# Patient Record
Sex: MALE | Race: WHITE | NOT HISPANIC OR LATINO | Employment: FULL TIME | URBAN - METROPOLITAN AREA
[De-identification: names, ages, dates, MRNs, and addresses within clinical notes are randomized per-mention and may not be internally consistent; named-entity substitution may affect disease eponyms.]

---

## 2019-02-18 ENCOUNTER — OFFICE VISIT (OUTPATIENT)
Dept: FAMILY MEDICINE | Facility: CLINIC | Age: 34
End: 2019-02-18
Payer: COMMERCIAL

## 2019-02-18 ENCOUNTER — LAB VISIT (OUTPATIENT)
Dept: LAB | Facility: HOSPITAL | Age: 34
End: 2019-02-18
Attending: FAMILY MEDICINE
Payer: COMMERCIAL

## 2019-02-18 VITALS
DIASTOLIC BLOOD PRESSURE: 72 MMHG | WEIGHT: 233.94 LBS | SYSTOLIC BLOOD PRESSURE: 113 MMHG | HEART RATE: 56 BPM | TEMPERATURE: 98 F

## 2019-02-18 DIAGNOSIS — Z13.220 ENCOUNTER FOR LIPID SCREENING FOR CARDIOVASCULAR DISEASE: Primary | ICD-10-CM

## 2019-02-18 DIAGNOSIS — Z13.220 ENCOUNTER FOR LIPID SCREENING FOR CARDIOVASCULAR DISEASE: ICD-10-CM

## 2019-02-18 DIAGNOSIS — Z13.6 ENCOUNTER FOR LIPID SCREENING FOR CARDIOVASCULAR DISEASE: Primary | ICD-10-CM

## 2019-02-18 DIAGNOSIS — Z76.0 MEDICATION REFILL: ICD-10-CM

## 2019-02-18 DIAGNOSIS — Z13.1 SCREENING FOR DIABETES MELLITUS: ICD-10-CM

## 2019-02-18 DIAGNOSIS — Z13.6 ENCOUNTER FOR LIPID SCREENING FOR CARDIOVASCULAR DISEASE: ICD-10-CM

## 2019-02-18 LAB
CHOLEST SERPL-MCNC: 241 MG/DL
CHOLEST/HDLC SERPL: 6.9 {RATIO}
GLUCOSE SERPL-MCNC: 92 MG/DL
HDLC SERPL-MCNC: 35 MG/DL
HDLC SERPL: 14.5 %
LDLC SERPL CALC-MCNC: 142 MG/DL
NONHDLC SERPL-MCNC: 206 MG/DL
TRIGL SERPL-MCNC: 320 MG/DL

## 2019-02-18 PROCEDURE — 99999 PR PBB SHADOW E&M-NEW PATIENT-LVL III: ICD-10-PCS | Mod: PBBFAC,,, | Performed by: FAMILY MEDICINE

## 2019-02-18 PROCEDURE — 99999 PR PBB SHADOW E&M-NEW PATIENT-LVL III: CPT | Mod: PBBFAC,,, | Performed by: FAMILY MEDICINE

## 2019-02-18 PROCEDURE — 36415 COLL VENOUS BLD VENIPUNCTURE: CPT | Mod: PO

## 2019-02-18 PROCEDURE — 99203 PR OFFICE/OUTPT VISIT, NEW, LEVL III, 30-44 MIN: ICD-10-PCS | Mod: S$GLB,,, | Performed by: FAMILY MEDICINE

## 2019-02-18 PROCEDURE — 80061 LIPID PANEL: CPT

## 2019-02-18 PROCEDURE — 99203 OFFICE O/P NEW LOW 30 MIN: CPT | Mod: S$GLB,,, | Performed by: FAMILY MEDICINE

## 2019-02-18 PROCEDURE — 82947 ASSAY GLUCOSE BLOOD QUANT: CPT

## 2019-02-18 RX ORDER — ESCITALOPRAM OXALATE 20 MG/1
20 TABLET ORAL DAILY
Qty: 90 TABLET | Refills: 3 | Status: SHIPPED | OUTPATIENT
Start: 2019-02-18 | End: 2020-02-28

## 2019-02-18 RX ORDER — ESCITALOPRAM OXALATE 20 MG/1
20 TABLET ORAL DAILY
Qty: 90 TABLET | Refills: 2 | Status: SHIPPED | OUTPATIENT
Start: 2019-02-18 | End: 2019-02-18 | Stop reason: CLARIF

## 2019-02-18 RX ORDER — ESCITALOPRAM OXALATE 20 MG/1
20 TABLET ORAL DAILY
COMMUNITY
End: 2019-02-18 | Stop reason: SDUPTHER

## 2019-02-18 NOTE — PATIENT INSTRUCTIONS
4 Steps for Eating Healthier  Changing the way you eat can improve your health. It can lower your cholesterol and blood pressure, and help you stay at a healthy weight. Your diet doesnt have to be bland and boring to be healthy. Just watch your calories and follow these steps:    1. Eat fewer unhealthy fats  · Choose more fish and lean meats instead of fatty cuts of meat.  · Skip butter and lard, and use less margarine.  · Pass on foods that have palm, coconut, or hydrogenated oils.  · Eat fewer high-fat dairy foods like cheese, ice cream, and whole milk.  · Get a heart-healthy cookbook and try some low-fat recipes.  2. Go light on salt  · Keep the saltshaker off the table.  · Limit high-salt ingredients, such as soy sauce, bouillon, and garlic salt.  · Instead of adding salt when cooking, season your food with herbs and flavorings. Try lemon, garlic, and onion.  · Limit convenience foods, such as boxed or canned foods and restaurant food.  · Read food labels and choose lower-sodium options.  3. Limit sugar  · Pause before you add sugars to pancakes, cereal, coffee, or tea. This includes white and brown table sugar, syrup, honey, and molasses. Cut your usual amount by half.  · Use non-sugar sweeteners. Stevia, aspartame, and sucralose can satisfy a sweet tooth without adding calories.  · Swap out sugar-filled soda and other drinks. Buy sugar-free or low-calorie beverages. Remember water is always the best choice.  · Read labels and choose foods with less added sugar. Keep in mind that dairy foods and foods with fruit will have some natural sugar.  · Cut the sugar in recipes by 1/3 to 1/2. Boost the flavor with extracts like almond, vanilla, or orange. Or add spices such as cinnamon or nutmeg.  4. Eat more fiber  · Eat fresh fruits and vegetables every day.  · Boost your diet with whole grains. Go for oats, whole-grain rice, and bran.  · Add beans and lentils to your meals.  · Drink more water to match your fiber  increase. This is to help prevent constipation.  Date Last Reviewed: 5/11/2015  © 4357-1401 [employer] [address] [place] [address], [place] [address]. All rights reserved. This information is not intended as a substitute for professional medical care. Always follow your healthcare professional's instructions.

## 2019-02-18 NOTE — PROGRESS NOTES
Subjective:       Patient ID: Denny Atkinson is a 34 y.o. male.    Chief Complaint: Establish Care    HPI   Presents to [facility] to establish Care. Currently has no complaints. Mentions that he would like to get his lipids checked due to his significant family history of htn and hyperlipidemia. Works out playing basketball about 3-5 times a week (for about an hour) but does admit he likes to eat greasy foods (like pizza).      Past Medical History:   Diagnosis Date    Allergy     Anxiety        Past Surgical History:   Procedure Laterality Date    FINGER SURGERY  2007    HERNIA REPAIR  1985       Family History   Problem Relation Age of Onset    Hypertension Father     Hyperlipidemia Father     Hypertension Brother     Hyperlipidemia Brother     Hypertension Paternal Grandmother     Cancer Paternal Grandfather     Hypertension Paternal Grandfather     Hyperlipidemia Paternal Grandfather        Review of patient's allergies indicates:  No Known Allergies        Social History     Substance and Sexual Activity   Drug Use No       Social History     Tobacco Use    Smoking status: Former Smoker     Types: Cigarettes     Last attempt to quit: 2004     Years since quitting: 15.1    Smokeless tobacco: Former User   Substance Use Topics    Alcohol use: No     Frequency: Never       Social History     Substance and Sexual Activity   Sexual Activity Yes         Current Outpatient Medications:     escitalopram oxalate (LEXAPRO) 20 MG tablet, Take 1 tablet (20 mg total) by mouth once daily., Disp: 90 tablet, Rfl: 3    Review of Systems   Constitutional: Negative for chills and fever.   HENT: Negative for congestion and sore throat.    Eyes: Negative for visual disturbance.   Respiratory: Negative for cough and shortness of breath.    Cardiovascular: Negative for chest pain.   Gastrointestinal: Negative for abdominal pain, constipation, diarrhea, nausea and vomiting.   Genitourinary: Negative for dysuria.    Musculoskeletal: Negative for joint swelling.   Skin: Negative for rash and wound.   Neurological: Negative for dizziness and headaches.   Hematological: Does not bruise/bleed easily.           Objective:          Vitals:    02/18/19 1037   BP: 113/72   Pulse: (!) 56   Temp: 97.6 °F (36.4 °C)   Weight: 106.1 kg (233 lb 14.5 oz)       Physical Exam   Constitutional: He appears well-developed and well-nourished. He is cooperative. No distress.   HENT:   Head: Normocephalic and atraumatic.   Right Ear: Hearing and external ear normal.   Left Ear: Hearing and external ear normal.   Nose: Nose normal.   Eyes: Conjunctivae, EOM and lids are normal.   Neck: Normal range of motion. Neck supple.   Cardiovascular: Normal rate, regular rhythm, normal heart sounds and normal pulses.   Pulmonary/Chest: Effort normal and breath sounds normal.   Abdominal: Soft. Normal appearance and bowel sounds are normal. There is no tenderness.   Musculoskeletal: Normal range of motion.   Neurological: He is alert.   Skin: Skin is warm. Capillary refill takes less than 2 seconds. No rash noted. No cyanosis.   Psychiatric: He has a normal mood and affect. His speech is normal and behavior is normal. Cognition and memory are normal.   Vitals reviewed.              Assessment/Plan     Denny was seen today for establish care.    Diagnoses and all orders for this visit:    Encounter for lipid screening for cardiovascular disease  -     Lipid panel; Future    Screening for diabetes mellitus  -     Glucose, fasting; Future    Medication refill  -     escitalopram oxalate (LEXAPRO) 20 MG tablet; Take 1 tablet (20 mg total) by mouth once daily.    Follow-up in about 1 year (around 2/18/2020) for wellness.    Future Appointments   Date Time Provider Department Center   2/18/2019  2:15 PM ALIZE DAVIES [facility] KEKE Cast MD  [facility]

## 2019-02-19 ENCOUNTER — PATIENT MESSAGE (OUTPATIENT)
Dept: FAMILY MEDICINE | Facility: CLINIC | Age: 34
End: 2019-02-19

## 2019-02-21 ENCOUNTER — TELEPHONE (OUTPATIENT)
Dept: FAMILY MEDICINE | Facility: CLINIC | Age: 34
End: 2019-02-21

## 2019-02-21 NOTE — TELEPHONE ENCOUNTER
----- Message from Chloe Zuleta sent at 2/21/2019  9:27 AM CST -----  Contact: self                                           attn:  Josefina  Patient [phone] is returning call to Nurse Josefina from this morning/please call

## 2019-02-21 NOTE — TELEPHONE ENCOUNTER
----- Message from Thuy Cast MD sent at 2/21/2019  8:10 AM CST -----  Can someone call this patient to see what exactly he is going to do about his cholesterol? His wife asked what to do through his proxy portal and I suggested maybe to try diet and exercise but I never got a response. I forgot to tell him he can also take fish oil to help with with the triglycerides. Can someone tell him that? Thanks!

## 2019-03-12 ENCOUNTER — OFFICE VISIT (OUTPATIENT)
Dept: ALLERGY | Facility: CLINIC | Age: 34
End: 2019-03-12
Payer: COMMERCIAL

## 2019-03-12 VITALS — OXYGEN SATURATION: 95 % | BODY MASS INDEX: 28.99 KG/M2 | WEIGHT: 225.88 LBS | HEIGHT: 74 IN | HEART RATE: 68 BPM

## 2019-03-12 DIAGNOSIS — J30.89 ALLERGIC RHINITIS DUE TO DUST MITE: ICD-10-CM

## 2019-03-12 DIAGNOSIS — J30.1 CHRONIC ALLERGIC RHINITIS DUE TO POLLEN: Primary | ICD-10-CM

## 2019-03-12 PROCEDURE — 3008F PR BODY MASS INDEX (BMI) DOCUMENTED: ICD-10-PCS | Mod: S$GLB,,, | Performed by: ALLERGY & IMMUNOLOGY

## 2019-03-12 PROCEDURE — 99204 OFFICE O/P NEW MOD 45 MIN: CPT | Mod: S$GLB,,, | Performed by: ALLERGY & IMMUNOLOGY

## 2019-03-12 PROCEDURE — 99999 PR PBB SHADOW E&M-EST. PATIENT-LVL III: CPT | Mod: PBBFAC,,, | Performed by: ALLERGY & IMMUNOLOGY

## 2019-03-12 PROCEDURE — 99204 PR OFFICE/OUTPT VISIT, NEW, LEVL IV, 45-59 MIN: ICD-10-PCS | Mod: S$GLB,,, | Performed by: ALLERGY & IMMUNOLOGY

## 2019-03-12 PROCEDURE — 99999 PR PBB SHADOW E&M-EST. PATIENT-LVL III: ICD-10-PCS | Mod: PBBFAC,,, | Performed by: ALLERGY & IMMUNOLOGY

## 2019-03-12 PROCEDURE — 3008F BODY MASS INDEX DOCD: CPT | Mod: S$GLB,,, | Performed by: ALLERGY & IMMUNOLOGY

## 2019-03-12 RX ORDER — CETIRIZINE HYDROCHLORIDE 10 MG/1
10 TABLET ORAL DAILY PRN
COMMUNITY

## 2019-03-12 NOTE — PROGRESS NOTES
ALLERGY & IMMUNOLOGY CLINIC - INITIAL CONSULTATION     HISTORY OF PRESENT ILLNESS     Patient ID: Denny Atkinson is a 34 y.o. male    CC: allergy - interested in sublingual immunotherapy    HPI: 35 yo man presents for initial evaluation of allergies.     Main allergy symptoms are sneezing, itchy eyes. Symptoms are worse in spring and whenever the season changes. Onset of symptoms in childhood. Has had skin testing multiple times, remembers pollen, dust, ragweed, mold, pet (feathers, cat, rabbit), cockroach as being positive. Allergy shots on and off x 15 years. Had an allergic reaction once time shortly after allergy shots - hives all over. Was treated with epinephrine.     Travel a lot - missed some doses of shots and felt shots were incompatible with his job/life. Stopped 5 years ago. Allergy symptoms worsened after he stopped shots.     Takes cetirizine daily and flonase as needed. Uses mattress covers, pillow covers, has HEPA filters in each room.     Dad is on sublingual drops for allergy self-administered at home. He is interested in this course of action.     Shellfish allergy - Never had a reaction, was eating, got skin testing, test was positive, was told to avoid, was prescribed an epipen. He ignored this advice and continues to eat. He does note mild irritation of throat when he eats shellfish, but only sometimes, and he still eats it anyway.      REVIEW OF SYSTEMS     CONST: no F/C/NS, no unintentional weight changes  NEURO: no H/A, no weakness, no paresthesias  EYES: no discharge, + pruritus, no erythema  EARS: no hearing loss, no sensation of fullness  NOSE: no congestion, no rhinorrhea, no discharge, no itching, + sneezing  PULM: no SOB, no wheezing, no cough  CV: no CP, + palpitations - mainly notices as he is trying to fall asleep at night, no leg swelling  GI: no dysphagia, no heartburn, no pain, no N/V/D  MSK: no joint pain, no muscle pain  DERM: no rashes, no skin breaks     MEDICAL HISTORY  "    MedHx: active problems reviewed  SurgHx: no ENT surgery  SocHx: nonsmoker, 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  FamHx: dad with allergic rhinitis symptoms  Allergies: see below  Medications: MAR reviewed    H/o Asthma: as a child  H/o Eczema: denies  H/o Rhinitis: see HPI  Oral Allergy: denies  Food Allergy: shellfish  Venom Allergy: denies  Latex Allergy: mild hand rash  Other Allergies: denies  Env/Occ: denies any environmental or occupational exposures     PHYSICAL EXAM     VS: Pulse 68   Ht 6' 2" (1.88 m)   Wt 102.4 kg (225 lb 13.8 oz)   SpO2 95%   BMI 29.00 kg/m²   GENERAL: alert, NAD, well-appearing, cooperative  EYES: PERRL, EOMI, no conjunctival injection, no discharge, no infraorbital shiners  EARS: external auditory canals normal B/L, TM normal B/L  NOSE: NT 2+ and pink B/L, +deviated septum,  No stringing mucous, no polyps  ORAL: MMM, no ulcers, no thrush, no cobblestoning  NECK: supple, trachea midline, no thyromegaly, no LAD  LUNGS: CTAB, no w/r/c, no increased WOB  HEART: RRR, normal S1/S2, no m/g/r  EXTREMITIES: +2 distal pulses, no c/c/e  LYMPHATICS: no cervical/submandibular LAD  DERM: no rashes, no skin breaks, no dystrophic fingernails  NEURO: normal gait, no facial asymmetry     ALLERGEN TESTING     Has had multiple skin testing in the past, last one he estimates was ten years ago.      ASSESSMENT & PLAN     Denny Atkinson is a 34 y.o. male with     1. chronic allergic rhinitis based on history of positive skin testing in the past.    -Reviewed medical management and recommend daily use of flonase, up to 2 SEN BID, reviewed proper technique.    -Reviewed immunotherapy options, including SCIT with rush and SLIT. Reviewed time commitment, risks, benefits, and efficacy of both.    -Will proceed with skin testing in one week - stop zyrtec from n ow until that visit. If a clear driving allergen is identified, we might proceed with specific SLIT, if not, I will likely recommend SCIT.     2. History of " shellfish allergy but eats with minimal issues   -Continue to eat if desired, likely false positive skin testing in the past.     Follow up 1 week for spt

## 2019-03-19 ENCOUNTER — OFFICE VISIT (OUTPATIENT)
Dept: ALLERGY | Facility: CLINIC | Age: 34
End: 2019-03-19
Payer: COMMERCIAL

## 2019-03-19 VITALS — WEIGHT: 225.75 LBS | HEART RATE: 68 BPM | BODY MASS INDEX: 28.97 KG/M2 | OXYGEN SATURATION: 98 % | HEIGHT: 74 IN

## 2019-03-19 DIAGNOSIS — J30.89 ALLERGIC RHINITIS DUE TO DUST MITE: ICD-10-CM

## 2019-03-19 DIAGNOSIS — J30.1 CHRONIC ALLERGIC RHINITIS DUE TO POLLEN: Primary | ICD-10-CM

## 2019-03-19 PROCEDURE — 99999 PR PBB SHADOW E&M-EST. PATIENT-LVL III: ICD-10-PCS | Mod: PBBFAC,,, | Performed by: ALLERGY & IMMUNOLOGY

## 2019-03-19 PROCEDURE — 3008F BODY MASS INDEX DOCD: CPT | Mod: S$GLB,,, | Performed by: ALLERGY & IMMUNOLOGY

## 2019-03-19 PROCEDURE — 99999 PR PBB SHADOW E&M-EST. PATIENT-LVL III: CPT | Mod: PBBFAC,,, | Performed by: ALLERGY & IMMUNOLOGY

## 2019-03-19 PROCEDURE — 95004 PERQ TESTS W/ALRGNC XTRCS: CPT | Mod: S$GLB,,, | Performed by: ALLERGY & IMMUNOLOGY

## 2019-03-19 PROCEDURE — 99213 OFFICE O/P EST LOW 20 MIN: CPT | Mod: 25,S$GLB,, | Performed by: ALLERGY & IMMUNOLOGY

## 2019-03-19 PROCEDURE — 95004 PR ALLERGY SKIN TESTS,ALLERGENS: ICD-10-PCS | Mod: S$GLB,,, | Performed by: ALLERGY & IMMUNOLOGY

## 2019-03-19 PROCEDURE — 99213 PR OFFICE/OUTPT VISIT, EST, LEVL III, 20-29 MIN: ICD-10-PCS | Mod: 25,S$GLB,, | Performed by: ALLERGY & IMMUNOLOGY

## 2019-03-19 PROCEDURE — 3008F PR BODY MASS INDEX (BMI) DOCUMENTED: ICD-10-PCS | Mod: S$GLB,,, | Performed by: ALLERGY & IMMUNOLOGY

## 2019-03-19 RX ORDER — EPINEPHRINE 0.3 MG/.3ML
1 INJECTION SUBCUTANEOUS ONCE
Qty: 0.3 ML | Refills: 1 | Status: SHIPPED | OUTPATIENT
Start: 2019-03-19 | End: 2024-03-28

## 2019-03-20 ENCOUNTER — TELEPHONE (OUTPATIENT)
Dept: PHARMACY | Facility: CLINIC | Age: 34
End: 2019-03-20

## 2019-03-21 NOTE — TELEPHONE ENCOUNTER
DOCUMENTATION ONLY:  Prior Authorization for Odactra approved from 03/21/2019 to 03/21/2020    Case Id: [id number]    Co-pay: $150.11    Patient Assistance IS required  ISIDRO        DOCUMENTATION ONLY  Submitted prior authorization request for Odactra to insurance on 03/21 5:03pm. ISIDRO  
5

## 2019-04-10 ENCOUNTER — TELEPHONE (OUTPATIENT)
Dept: PHARMACY | Facility: CLINIC | Age: 34
End: 2019-04-10

## 2019-04-10 RX ORDER — FLUTICASONE PROPIONATE 50 MCG
1 SPRAY, SUSPENSION (ML) NASAL DAILY
COMMUNITY

## 2019-04-10 NOTE — TELEPHONE ENCOUNTER
Initial Odactra consult completed on 4/10/19. Odactra and EpiPen will be delivered to [facility] on 4/15/19. $25 + $12 (Epipen) copay. First dose date pending appointment. inBasket sent to arrange delivery, CC on file. Confirmed 2 patient identifiers - name and . Therapy Appropriate.  confirmed with Fiordaliza Vivar on  to be sent to:    Attention: Charlene, [facility]   [address] [place] [address]     Pt reports history of allergy shots. Med Rec - Fluticasone bought OTC - added to profile. No missed work/activites.     Indication: dust-mite allergy  Goals of Therapy: Improve QoL through reduction in allergy symptoms     Patient was counseled on the administration directions:  1. Administer first dose in a health care setting due to the potential for allergic reactions; monitor patient for 30 minutes after first dose.   2. If well tolerated, subsequent doses may be taken at home  3. Remove sublingual tablet from blister immediately prior to administration.  4. Place tablet(s) under tongue until completely dissolved (>1 minute) and then swallow.   5. Wash hands after handling tablet.   6. Avoid food or beverage for 5 minutes following dissolution of tablet (to prevent the swallowing of allergen extract).  7. Auto-injectable epinephrine should be made available to patients.  8. If you have throat swelling, difficulty swallowing or difficulty breathing, use Epi Pen, call 911 and go to E.R.  9. May begin to have relief of house dust mite allergy symptoms within 2-3 months.  Missed Doses: take dose as soon as remembered. If not remembered till next day, skip dose - never take 2 doses in one day.     Patient was counseled on the following possible side effects, which include, but are not limited to:  · Oral itching (61%), Local pruritus (in the ear; 52%), Throat irritation (67%)  · swelling of lips (18%), swollen tongue (16%), Mouth edema (20%, uvula)  · nausea (14%)    DDIs: medication list reviewed-  None upon initial review.      Patient verbalized understanding. Consultation included: the importance of compliance and the importance of keeping all follow up appointments. All questions answered and addressed to patients satisfaction. [facility] will f/u with patient 1 week after start and [facility] to contact patient in 3 weeks for refills.     Agnes Sotelo, PharmD  Specialty Pharmacy Clinical Pharmacist  [facility]  P: [phone]

## 2019-04-11 NOTE — TELEPHONE ENCOUNTER
Call attempt 1 to inform patient that EpiPen script paid for a copay of $12. Need his permission to dispense along with the Odactra. Sent [employer].

## 2019-04-15 ENCOUNTER — PATIENT MESSAGE (OUTPATIENT)
Dept: ALLERGY | Facility: CLINIC | Age: 34
End: 2019-04-15

## 2019-04-16 ENCOUNTER — TELEPHONE (OUTPATIENT)
Dept: ALLERGY | Facility: CLINIC | Age: 34
End: 2019-04-16

## 2019-04-23 ENCOUNTER — OFFICE VISIT (OUTPATIENT)
Dept: ALLERGY | Facility: CLINIC | Age: 34
End: 2019-04-23
Payer: COMMERCIAL

## 2019-04-23 VITALS — HEIGHT: 74 IN | WEIGHT: 225.75 LBS | TEMPERATURE: 99 F | BODY MASS INDEX: 28.97 KG/M2

## 2019-04-23 DIAGNOSIS — J30.89 ALLERGIC RHINITIS DUE TO DUST MITE: Primary | ICD-10-CM

## 2019-04-23 DIAGNOSIS — J30.1 CHRONIC ALLERGIC RHINITIS DUE TO POLLEN: ICD-10-CM

## 2019-04-23 PROCEDURE — 99999 PR PBB SHADOW E&M-EST. PATIENT-LVL III: CPT | Mod: PBBFAC,,, | Performed by: ALLERGY & IMMUNOLOGY

## 2019-04-23 PROCEDURE — 99999 PR PBB SHADOW E&M-EST. PATIENT-LVL III: ICD-10-PCS | Mod: PBBFAC,,, | Performed by: ALLERGY & IMMUNOLOGY

## 2019-04-23 PROCEDURE — 3008F PR BODY MASS INDEX (BMI) DOCUMENTED: ICD-10-PCS | Mod: S$GLB,,, | Performed by: ALLERGY & IMMUNOLOGY

## 2019-04-23 PROCEDURE — 99213 PR OFFICE/OUTPT VISIT, EST, LEVL III, 20-29 MIN: ICD-10-PCS | Mod: S$GLB,,, | Performed by: ALLERGY & IMMUNOLOGY

## 2019-04-23 PROCEDURE — 99213 OFFICE O/P EST LOW 20 MIN: CPT | Mod: S$GLB,,, | Performed by: ALLERGY & IMMUNOLOGY

## 2019-04-23 PROCEDURE — 3008F BODY MASS INDEX DOCD: CPT | Mod: S$GLB,,, | Performed by: ALLERGY & IMMUNOLOGY

## 2019-04-23 NOTE — PROGRESS NOTES
"ALLERGY & IMMUNOLOGY CLINIC - FOLLOW UP     HISTORY OF PRESENT ILLNESS     Patient ID: Denny Atkinson is a 34 y.o. male    CC: first odactra dose    HPI: 33 yo man with history of allergic rhinitis presents for first dose of odactra sublingual dust mite immunotherapy. Feeling well. Rhinitis symptoms improved on daily cetirizine.      REVIEW OF SYSTEMS     CONST: no F/C/NS, no unintentional weight changes  NEURO: no H/A, no weakness, no paresthesias  EYES: no discharge, no pruritus, no erythema  EARS: no hearing loss, no sensation of fullness  NOSE: no congestion, no rhinorrhea, no itching, no sneezing  PULM: no SOB, no wheezing, no cough  CV: no CP, no palpitations, no leg swelling  GI: no dysphagia, no heartburn, no pain, no N/V/D  MSK: no joint pain, no muscle pain  DERM: no rashes, no skin breaks     MEDICAL HISTORY     MedHx: active problems reviewed     PHYSICAL EXAM     VS: Temp 98.6 °F (37 °C)   Ht 6' 2" (1.88 m)   Wt 102.4 kg (225 lb 12 oz)   BMI 28.98 kg/m²   GENERAL: alert, NAD, well-appearing, cooperative  EYES: EOMI, no conjunctival injection  NECK: supple  DERM: no rashes, no skin breaks, no dystrophic fingernails  NEURO: normal gait, no facial asymmetry     ALLERGEN TESTING     Skin Prick: 3/19/19: positives include dust mite, cat, dog, cockroach     ASSESSMENT & PLAN     Denny Atkinson is a 34 y.o. male with     Allergic rhinitis due to dust mite  Allergic rhinitis due to animals  Allergic rhinitis due to cockroach    Odactra first dose given today in clinic according to the rules of the package insert. Patient was observed for 30 minutes without incident.   EpiPen also provided to patient at this time, reviewed when and how to use, patient taught back.     Follow up: 1 year, sooner if needed.    Inna Augustin MD  Allergy/Immunology    "

## 2019-05-17 ENCOUNTER — TELEPHONE (OUTPATIENT)
Dept: PHARMACY | Facility: CLINIC | Age: 34
End: 2019-05-17

## 2019-05-17 NOTE — TELEPHONE ENCOUNTER
7 day follow-up call and refill set-up. Patient states he is doing well on therapy with just a little tingling when he takes it.

## 2019-06-13 ENCOUNTER — TELEPHONE (OUTPATIENT)
Dept: PHARMACY | Facility: CLINIC | Age: 34
End: 2019-06-13

## 2019-06-21 NOTE — TELEPHONE ENCOUNTER
Patient returned call and confirmed need of Odactra refill. $25 copay in 004. Patient is currently in [place] and has enough medication until 6/26. Will ship 6/24 to arrive 6/25 (he returns 6/24 evening). No changes to report and no questions or concerns at this time.

## 2019-07-24 ENCOUNTER — TELEPHONE (OUTPATIENT)
Dept: PHARMACY | Facility: CLINIC | Age: 34
End: 2019-07-24

## 2019-08-02 ENCOUNTER — PATIENT MESSAGE (OUTPATIENT)
Dept: ALLERGY | Facility: CLINIC | Age: 34
End: 2019-08-02

## 2019-08-21 ENCOUNTER — TELEPHONE (OUTPATIENT)
Dept: PHARMACY | Facility: CLINIC | Age: 34
End: 2019-08-21

## 2019-08-23 ENCOUNTER — TELEPHONE (OUTPATIENT)
Dept: PHARMACY | Facility: CLINIC | Age: 34
End: 2019-08-23

## 2019-09-20 ENCOUNTER — TELEPHONE (OUTPATIENT)
Dept: PHARMACY | Facility: CLINIC | Age: 34
End: 2019-09-20

## 2019-10-23 ENCOUNTER — TELEPHONE (OUTPATIENT)
Dept: PHARMACY | Facility: CLINIC | Age: 34
End: 2019-10-23

## 2019-11-25 ENCOUNTER — TELEPHONE (OUTPATIENT)
Dept: PHARMACY | Facility: CLINIC | Age: 34
End: 2019-11-25

## 2019-12-24 ENCOUNTER — TELEPHONE (OUTPATIENT)
Dept: PHARMACY | Facility: CLINIC | Age: 34
End: 2019-12-24

## 2020-01-29 ENCOUNTER — TELEPHONE (OUTPATIENT)
Dept: PHARMACY | Facility: CLINIC | Age: 35
End: 2020-01-29

## 2020-02-28 ENCOUNTER — TELEPHONE (OUTPATIENT)
Dept: PHARMACY | Facility: CLINIC | Age: 35
End: 2020-02-28

## 2020-02-28 DIAGNOSIS — Z76.0 MEDICATION REFILL: ICD-10-CM

## 2020-02-28 RX ORDER — ESCITALOPRAM OXALATE 20 MG/1
TABLET ORAL
Qty: 90 TABLET | Refills: 1 | Status: SHIPPED | OUTPATIENT
Start: 2020-02-28 | End: 2020-08-07 | Stop reason: SDUPTHER

## 2020-03-24 DIAGNOSIS — J30.89 ALLERGIC RHINITIS DUE TO DUST MITE: ICD-10-CM

## 2020-03-24 DIAGNOSIS — J30.1 CHRONIC ALLERGIC RHINITIS DUE TO POLLEN: ICD-10-CM

## 2020-03-30 ENCOUNTER — TELEPHONE (OUTPATIENT)
Dept: PHARMACY | Facility: CLINIC | Age: 35
End: 2020-03-30

## 2020-03-30 ENCOUNTER — PATIENT MESSAGE (OUTPATIENT)
Dept: FAMILY MEDICINE | Facility: CLINIC | Age: 35
End: 2020-03-30

## 2020-03-30 ENCOUNTER — TELEPHONE (OUTPATIENT)
Dept: FAMILY MEDICINE | Facility: CLINIC | Age: 35
End: 2020-03-30

## 2020-04-02 NOTE — TELEPHONE ENCOUNTER
Odactra confirmed. We will ship Odactra refill on  via fedex to arrive on . $25 copay- 004. Confirmed 2 patient identifiers - name and . Therapy appropriate.     Patient has 5 doses of Odactra remaining.  Pt reports they are not having any side effects so far. No missed doses, no new medications, no new allergies or health conditions reported at this time. All questions answered and addressed to patients satisfaction. Advised to call [facility] and provider if any issues arise.  Pt verbalized understanding.

## 2020-04-27 ENCOUNTER — TELEPHONE (OUTPATIENT)
Dept: PHARMACY | Facility: CLINIC | Age: 35
End: 2020-04-27

## 2020-05-18 ENCOUNTER — TELEPHONE (OUTPATIENT)
Dept: PHARMACY | Facility: CLINIC | Age: 35
End: 2020-05-18

## 2020-05-20 ENCOUNTER — TELEPHONE (OUTPATIENT)
Dept: PHARMACY | Facility: CLINIC | Age: 35
End: 2020-05-20

## 2020-05-26 ENCOUNTER — TELEPHONE (OUTPATIENT)
Dept: PHARMACY | Facility: CLINIC | Age: 35
End: 2020-05-26

## 2020-06-03 NOTE — TELEPHONE ENCOUNTER
Call attempt 4 for Odactra refill - LVM. Muxlim message read on 5/26. Sending postcard to address on file and InBasket to provider. Will close out in a week if no response.     Stevie Pineda, PharmD  Clinical Pharmacist   [facility]   P: [phone]

## 2020-06-10 ENCOUNTER — TELEPHONE (OUTPATIENT)
Dept: PHARMACY | Facility: CLINIC | Age: 35
End: 2020-06-10

## 2020-06-10 NOTE — TELEPHONE ENCOUNTER
Refill and followup call for Odatra. Patient confirmed need of the refill. Will deliver via FedEx on 6/10 to arrive on  with patient consent. Copay $25.00 at 004 with auth to charge [facility]. Address confirmed. Patient has 2 doses remaining (2 day supply). Patient denies missed doses and no side effects.  No new medications/allergies/medical conditions. Labs are stable. No ER/Urgent care visits in past month. Patient taking the medication as directed. Patient denies any further questions. Confirmed 2 patient identifiers - Name and .    Gato Ventura, PharmD  Clinical Pharmacist  [facility]  P: [phone]

## 2020-06-16 ENCOUNTER — TELEPHONE (OUTPATIENT)
Dept: FAMILY MEDICINE | Facility: CLINIC | Age: 35
End: 2020-06-16

## 2020-06-16 NOTE — TELEPHONE ENCOUNTER
Left message letting pt know that [facility], that way insurance will cover, provider knows what labs are needed, and patient will know what labs provider has requested.

## 2020-06-16 NOTE — TELEPHONE ENCOUNTER
----- Message from Natalie Sanchez sent at 6/16/2020  2:48 PM CDT -----  Regarding: orders for lab  Type: Needs Medical Advice  Who Called:  pt  Symptoms (please be specific):    How long has patient had these symptoms:    Pharmacy name and phone #:    Best Call Back Number:     Additional Information: pt stated states he would like for provider to order labs prior to coming to schedule appt. June 16, @ 5 pm. thanks

## 2020-07-09 ENCOUNTER — TELEPHONE (OUTPATIENT)
Dept: PHARMACY | Facility: CLINIC | Age: 35
End: 2020-07-09

## 2020-07-21 ENCOUNTER — OFFICE VISIT (OUTPATIENT)
Dept: DERMATOLOGY | Facility: CLINIC | Age: 35
End: 2020-07-21
Payer: COMMERCIAL

## 2020-07-21 DIAGNOSIS — L20.89 OTHER ATOPIC DERMATITIS: ICD-10-CM

## 2020-07-21 DIAGNOSIS — L30.1 DYSHIDROTIC ECZEMA: Primary | ICD-10-CM

## 2020-07-21 PROCEDURE — 99202 PR OFFICE/OUTPT VISIT, NEW, LEVL II, 15-29 MIN: ICD-10-PCS | Mod: 95,,, | Performed by: DERMATOLOGY

## 2020-07-21 PROCEDURE — 99202 OFFICE O/P NEW SF 15 MIN: CPT | Mod: 95,,, | Performed by: DERMATOLOGY

## 2020-07-21 RX ORDER — BETAMETHASONE DIPROPIONATE 0.5 MG/G
CREAM TOPICAL 2 TIMES DAILY PRN
Qty: 45 G | Refills: 1 | Status: SHIPPED | OUTPATIENT
Start: 2020-07-21 | End: 2020-07-24 | Stop reason: SDUPTHER

## 2020-07-21 RX ORDER — CRISABOROLE 20 MG/G
OINTMENT TOPICAL
Qty: 60 G | Refills: 1 | Status: SHIPPED | OUTPATIENT
Start: 2020-07-21 | End: 2020-07-24 | Stop reason: SDUPTHER

## 2020-07-21 NOTE — PATIENT INSTRUCTIONS
HAND ECZEMA INSTRUCTIONS    Use Eucrisa ointment twice a day (non-steroid, safe to use long term).  Use betamethasone cream at bedtime (strong steroid, use as needed for flares only). Can use both medications with white cotton gloves at bedtime.  Apply medications first and then apply barrier cream (Pratibha's, Desitin or Aquaphor 3-in-1 diaper rash cream) and then apply white cotton gloves.   Can use Vanicream or CeraVe cream after each hand washing  Can use dove sensitive skin bar for hand washing

## 2020-07-21 NOTE — PROGRESS NOTES
Subjective:       Patient ID:  Denny Atkinson is a 35 y.o. male who presents for No chief complaint on file.    The patient location is: home  The chief complaint leading to consultation is: rash    Visit type: audiovisual    Face to Face time with patient: 15 min  15 minutes of total time spent on the encounter, which includes face to face time and non-face to face time preparing to see the patient (eg, review of tests), Obtaining and/or reviewing separately obtained history, Documenting clinical information in the electronic or other health record, Independently interpreting results (not separately reported) and communicating results to the patient/family/caregiver, or Care coordination (not separately reported).         Each patient to whom he or she provides medical services by telemedicine is:  (1) informed of the relationship between the physician and patient and the respective role of any other health care provider with respect to management of the patient; and (2) notified that he or she may decline to receive medical services by telemedicine and may withdraw from such care at any time.    Notes:     History of Present Illness: The patient presents with chief complaint of rash.  Location: hands  Duration: several months  Signs/Symptoms: scaly, pruritus    Prior treatments: Gold bond psoriasis relief cream, HTC        Review of Systems   Constitutional: Negative for fever and chills.   Gastrointestinal: Negative for nausea and vomiting.   Skin: Positive for itching and rash. Negative for daily sunscreen use, activity-related sunscreen use and recent sunburn.   Hematologic/Lymphatic: Does not bruise/bleed easily.        Objective:    Physical Exam   Constitutional: He appears well-developed and well-nourished. No distress.   Neurological: He is alert and oriented to person, place, and time. He is not disoriented.   Psychiatric: He has a normal mood and affect.   Skin:   Areas Examined (abnormalities noted  in diagram):   Head / Face Inspection Performed  Neck Inspection Performed  RUE Inspected  LUE Inspection Performed  Nails and Digits Inspection Performed               Assessment / Plan:        Dyshidrotic eczema  Other atopic dermatitis  -     betamethasone dipropionate (DIPROLENE) 0.05 % cream; Apply topically 2 (two) times daily as needed. Can use with gloves at bedtime  Dispense: 45 g; Refill: 1  -     crisaborole (EUCRISA) 2 % Oint; AAA bid.  Non-steroid.  Safe to use long-term  Dispense: 60 g; Refill: 1  -     Discussed diagnosis and relation to sensitive skin.  Will start above meds and with white cotton glove occlusion at bedtime.  Recommend OTC barrier cream and vanicream after each hand washing throughout the day.  AVS given.            Follow up if symptoms worsen or fail to improve.     **20 minutes spent in counseling and coordination of care**

## 2020-07-21 NOTE — TELEPHONE ENCOUNTER
Refill call regarding Odactra at [facility].  Will prepare for shipment with patient consent on  to arrive .  Copay $25.00 ([facility] 8274) Patient states he is fine until .  Patient has not started any new medications including OTC drugs. Patient has not had any medication/ dose or instruction changes. No new allergies or side effects reported with this shipment. Medication is being taken as prescribed by physician and properly stored. Two patient identifiers:  and Address verified. Patient has no questions or concerns for [facility].

## 2020-07-24 DIAGNOSIS — L30.1 DYSHIDROTIC ECZEMA: ICD-10-CM

## 2020-07-24 DIAGNOSIS — L20.89 OTHER ATOPIC DERMATITIS: ICD-10-CM

## 2020-07-28 RX ORDER — BETAMETHASONE DIPROPIONATE 0.5 MG/G
CREAM TOPICAL 2 TIMES DAILY PRN
Qty: 45 G | Refills: 1 | Status: SHIPPED | OUTPATIENT
Start: 2020-07-28 | End: 2022-01-26

## 2020-07-28 RX ORDER — CRISABOROLE 20 MG/G
OINTMENT TOPICAL
Qty: 60 G | Refills: 1 | Status: SHIPPED | OUTPATIENT
Start: 2020-07-28 | End: 2022-01-26

## 2020-08-07 ENCOUNTER — OFFICE VISIT (OUTPATIENT)
Dept: FAMILY MEDICINE | Facility: CLINIC | Age: 35
End: 2020-08-07
Payer: COMMERCIAL

## 2020-08-07 VITALS
OXYGEN SATURATION: 98 % | DIASTOLIC BLOOD PRESSURE: 80 MMHG | WEIGHT: 234.38 LBS | TEMPERATURE: 98 F | SYSTOLIC BLOOD PRESSURE: 112 MMHG | HEART RATE: 66 BPM | BODY MASS INDEX: 30.08 KG/M2 | HEIGHT: 74 IN

## 2020-08-07 DIAGNOSIS — Z00.00 WELLNESS EXAMINATION: Primary | ICD-10-CM

## 2020-08-07 DIAGNOSIS — H61.23 BILATERAL IMPACTED CERUMEN: ICD-10-CM

## 2020-08-07 DIAGNOSIS — Z76.0 MEDICATION REFILL: ICD-10-CM

## 2020-08-07 PROCEDURE — 99999 PR PBB SHADOW E&M-EST. PATIENT-LVL III: CPT | Mod: PBBFAC,,, | Performed by: FAMILY MEDICINE

## 2020-08-07 PROCEDURE — 99999 PR PBB SHADOW E&M-EST. PATIENT-LVL III: ICD-10-PCS | Mod: PBBFAC,,, | Performed by: FAMILY MEDICINE

## 2020-08-07 PROCEDURE — 99395 PREV VISIT EST AGE 18-39: CPT | Mod: S$GLB,,, | Performed by: FAMILY MEDICINE

## 2020-08-07 PROCEDURE — 99395 PR PREVENTIVE VISIT,EST,18-39: ICD-10-PCS | Mod: S$GLB,,, | Performed by: FAMILY MEDICINE

## 2020-08-07 RX ORDER — ESCITALOPRAM OXALATE 20 MG/1
20 TABLET ORAL DAILY
Qty: 90 TABLET | Refills: 3 | Status: SHIPPED | OUTPATIENT
Start: 2020-08-07 | End: 2020-12-29 | Stop reason: SDUPTHER

## 2020-08-07 NOTE — PROGRESS NOTES
Subjective:       Patient ID: Denny Atkinson is a 35 y.o. male.    Chief Complaint: Annual Exam    HPI    Mr. Atkinson presents to clinic for his annual. Currently has no complaints.     Diet/Exercises: Hasn't been able to exercise or diet as much due to recent pandemic. Used to play basketball but stopped.       Past Medical History:   Diagnosis Date    Allergy     Anxiety        Past Surgical History:   Procedure Laterality Date    FINGER SURGERY  2007    HERNIA REPAIR  1985       Family History   Problem Relation Age of Onset    Hypertension Father     Hyperlipidemia Father     Hypertension Brother     Hyperlipidemia Brother     Hypertension Paternal Grandmother     Cancer Paternal Grandfather     Hypertension Paternal Grandfather     Hyperlipidemia Paternal Grandfather        Social History     Tobacco Use    Smoking status: Former Smoker     Types: Cigarettes     Quit date:      Years since quittin.6    Smokeless tobacco: Never Used   Substance Use Topics    Alcohol use: No     Frequency: Never     Drinks per session: Patient refused     Binge frequency: Never    Drug use: No       Social History     Substance and Sexual Activity   Sexual Activity Yes          Current Outpatient Medications:     allerg xt,D.farinae-D.pteronys (ODACTRA) 12 SQ-HDM Subl, Place 1 tablet under the tongue once daily., Disp: 30 tablet, Rfl: 11    betamethasone dipropionate (DIPROLENE) 0.05 % cream, Apply topically 2 (two) times daily as needed. Can use with gloves at bedtime, Disp: 45 g, Rfl: 1    cetirizine (ZYRTEC) 10 MG tablet, Take 10 mg by mouth once daily., Disp: , Rfl:     crisaborole (EUCRISA) 2 % Oint, AAA bid.  Non-steroid.  Safe to use long-term, Disp: 60 g, Rfl: 1    EPINEPHrine (EPIPEN) 0.3 mg/0.3 mL AtIn, Inject 0.3 mLs (0.3 mg total) into the muscle once. for 1 dose, Disp: 0.3 mL, Rfl: 1    escitalopram oxalate (LEXAPRO) 20 MG tablet, TAKE 1 TABLET BY MOUTH EVERY DAY, Disp: 90 tablet, Rfl:  "1    fluticasone (FLONASE) 50 mcg/actuation nasal spray, 1 spray by Each Nare route once daily., Disp: , Rfl:      Review of patient's allergies indicates:  No Known Allergies         Review of Systems   Constitutional: Negative for chills and fever.   HENT: Negative for congestion and sore throat.    Eyes: Negative for visual disturbance.   Respiratory: Negative for cough and shortness of breath.    Cardiovascular: Negative for chest pain.   Gastrointestinal: Negative for abdominal pain, constipation, diarrhea, nausea and vomiting.   Genitourinary: Negative for dysuria.   Musculoskeletal: Negative for joint swelling.   Skin: Negative for rash and wound.   Neurological: Negative for dizziness and headaches.   Hematological: Does not bruise/bleed easily.           Objective:          Vitals:    08/07/20 1149   BP: 112/80   Pulse: 66   Temp: 97.6 °F (36.4 °C)   SpO2: 98%   Weight: 106.3 kg (234 lb 5.6 oz)   Height: 6' 2" (1.88 m)       Physical Exam  Vitals signs reviewed.   Constitutional:       General: He is not in acute distress.     Appearance: Normal appearance. He is well-developed.   HENT:      Head: Normocephalic and atraumatic.      Right Ear: External ear normal. There is impacted cerumen.      Left Ear: External ear normal. There is impacted cerumen.   Eyes:      Conjunctiva/sclera: Conjunctivae normal.   Cardiovascular:      Rate and Rhythm: Normal rate and regular rhythm.      Pulses: Normal pulses.      Heart sounds: Normal heart sounds.   Pulmonary:      Effort: Pulmonary effort is normal.      Breath sounds: Normal breath sounds.   Abdominal:      General: Bowel sounds are normal.      Palpations: Abdomen is soft.      Tenderness: There is no abdominal tenderness.   Musculoskeletal: Normal range of motion.   Skin:     General: Skin is warm.      Findings: No rash.   Neurological:      General: No focal deficit present.      Mental Status: He is alert.   Psychiatric:         Speech: Speech " normal.         Behavior: Behavior normal. Behavior is cooperative.                 Assessment/Plan     Denny was seen today for annual exam.    Diagnoses and all orders for this visit:    Wellness examination  -     Lipid Panel; Future  -     Comprehensive metabolic panel; Future  -     CBC auto differential; Future  -     URINALYSIS; Future    Medication refill  -     escitalopram oxalate (LEXAPRO) 20 MG tablet; Take 1 tablet (20 mg total) by mouth once daily.    Bilateral impacted cerumen  -     Ear wax removal          Follow up in about 1 year (around 8/7/2021) for wellness.    Future Appointments   Date Time Provider Department Center   8/11/2020 12:00 PM SPECIMEN, ANNAMARIA [facility] SPECLAB Annamaria   8/11/2020 12:15 PM LAB, [place] [facility] LAB Annamaria Cast MD  [facility]

## 2020-08-11 ENCOUNTER — LAB VISIT (OUTPATIENT)
Dept: LAB | Facility: HOSPITAL | Age: 35
End: 2020-08-11
Attending: FAMILY MEDICINE
Payer: COMMERCIAL

## 2020-08-11 DIAGNOSIS — Z00.00 WELLNESS EXAMINATION: ICD-10-CM

## 2020-08-11 LAB
ALBUMIN SERPL BCP-MCNC: 4.7 G/DL (ref 3.5–5.2)
ALP SERPL-CCNC: 98 U/L (ref 55–135)
ALT SERPL W/O P-5'-P-CCNC: 44 U/L (ref 10–44)
ANION GAP SERPL CALC-SCNC: 9 MMOL/L (ref 8–16)
AST SERPL-CCNC: 21 U/L (ref 10–40)
BASOPHILS # BLD AUTO: 0.02 K/UL (ref 0–0.2)
BASOPHILS NFR BLD: 0.3 % (ref 0–1.9)
BILIRUB SERPL-MCNC: 1.1 MG/DL (ref 0.1–1)
BUN SERPL-MCNC: 11 MG/DL (ref 6–20)
CALCIUM SERPL-MCNC: 9.9 MG/DL (ref 8.7–10.5)
CHLORIDE SERPL-SCNC: 104 MMOL/L (ref 95–110)
CHOLEST SERPL-MCNC: 268 MG/DL (ref 120–199)
CHOLEST/HDLC SERPL: 7.1 {RATIO} (ref 2–5)
CO2 SERPL-SCNC: 26 MMOL/L (ref 23–29)
CREAT SERPL-MCNC: 0.9 MG/DL (ref 0.5–1.4)
DIFFERENTIAL METHOD: ABNORMAL
EOSINOPHIL # BLD AUTO: 0.2 K/UL (ref 0–0.5)
EOSINOPHIL NFR BLD: 2.8 % (ref 0–8)
ERYTHROCYTE [DISTWIDTH] IN BLOOD BY AUTOMATED COUNT: 13 % (ref 11.5–14.5)
EST. GFR  (AFRICAN AMERICAN): >60 ML/MIN/1.73 M^2
EST. GFR  (NON AFRICAN AMERICAN): >60 ML/MIN/1.73 M^2
GLUCOSE SERPL-MCNC: 100 MG/DL (ref 70–110)
HCT VFR BLD AUTO: 46.4 % (ref 40–54)
HDLC SERPL-MCNC: 38 MG/DL (ref 40–75)
HDLC SERPL: 14.2 % (ref 20–50)
HGB BLD-MCNC: 14.9 G/DL (ref 14–18)
IMM GRANULOCYTES # BLD AUTO: 0 K/UL (ref 0–0.04)
IMM GRANULOCYTES NFR BLD AUTO: 0 % (ref 0–0.5)
LDLC SERPL CALC-MCNC: 185.2 MG/DL (ref 63–159)
LYMPHOCYTES # BLD AUTO: 2.9 K/UL (ref 1–4.8)
LYMPHOCYTES NFR BLD: 50.3 % (ref 18–48)
MCH RBC QN AUTO: 30.7 PG (ref 27–31)
MCHC RBC AUTO-ENTMCNC: 32.1 G/DL (ref 32–36)
MCV RBC AUTO: 96 FL (ref 82–98)
MONOCYTES # BLD AUTO: 0.5 K/UL (ref 0.3–1)
MONOCYTES NFR BLD: 9.2 % (ref 4–15)
NEUTROPHILS # BLD AUTO: 2.2 K/UL (ref 1.8–7.7)
NEUTROPHILS NFR BLD: 37.4 % (ref 38–73)
NONHDLC SERPL-MCNC: 230 MG/DL
NRBC BLD-RTO: 0 /100 WBC
PLATELET # BLD AUTO: 282 K/UL (ref 150–350)
PMV BLD AUTO: 10.9 FL (ref 9.2–12.9)
POTASSIUM SERPL-SCNC: 4.1 MMOL/L (ref 3.5–5.1)
PROT SERPL-MCNC: 7.9 G/DL (ref 6–8.4)
RBC # BLD AUTO: 4.86 M/UL (ref 4.6–6.2)
SODIUM SERPL-SCNC: 139 MMOL/L (ref 136–145)
TRIGL SERPL-MCNC: 224 MG/DL (ref 30–150)
WBC # BLD AUTO: 5.76 K/UL (ref 3.9–12.7)

## 2020-08-11 PROCEDURE — 80061 LIPID PANEL: CPT

## 2020-08-11 PROCEDURE — 80053 COMPREHEN METABOLIC PANEL: CPT

## 2020-08-11 PROCEDURE — 85025 COMPLETE CBC W/AUTO DIFF WBC: CPT

## 2020-08-11 PROCEDURE — 36415 COLL VENOUS BLD VENIPUNCTURE: CPT | Mod: PO

## 2020-08-14 ENCOUNTER — TELEPHONE (OUTPATIENT)
Dept: PHARMACY | Facility: CLINIC | Age: 35
End: 2020-08-14

## 2020-08-22 ENCOUNTER — TELEPHONE (OUTPATIENT)
Dept: PHARMACY | Facility: CLINIC | Age: 35
End: 2020-08-22

## 2020-09-10 ENCOUNTER — TELEPHONE (OUTPATIENT)
Dept: FAMILY MEDICINE | Facility: CLINIC | Age: 35
End: 2020-09-10

## 2020-09-14 ENCOUNTER — TELEPHONE (OUTPATIENT)
Dept: PHARMACY | Facility: CLINIC | Age: 35
End: 2020-09-14

## 2020-10-09 ENCOUNTER — TELEPHONE (OUTPATIENT)
Dept: FAMILY MEDICINE | Facility: CLINIC | Age: 35
End: 2020-10-09

## 2020-10-14 ENCOUNTER — TELEPHONE (OUTPATIENT)
Dept: FAMILY MEDICINE | Facility: CLINIC | Age: 35
End: 2020-10-14

## 2020-10-14 NOTE — TELEPHONE ENCOUNTER
----- Message from Saray Guajardo MA sent at 10/14/2020  2:47 PM CDT -----  Returning a call to Dr.Simon  Call back # [phone]

## 2020-10-15 ENCOUNTER — TELEPHONE (OUTPATIENT)
Dept: FAMILY MEDICINE | Facility: CLINIC | Age: 35
End: 2020-10-15

## 2020-10-15 NOTE — TELEPHONE ENCOUNTER
Multiple attempts have been made to contact patient regarding lab results from 8/11/20.   Please make result note and release in  portal.

## 2020-10-15 NOTE — TELEPHONE ENCOUNTER
Yea I tried to call him a few times. I know he hasn't been exercising much based on what he told me last time. If he tries to exercise more and cut back on greasy/carb heavy foods it may help with his cholesterol. Sorry for the phone tag.

## 2020-10-15 NOTE — TELEPHONE ENCOUNTER
Spoke to pt to inform him that lab results are awaiting MD interpretations. Pt stated, he has viewed his results via pt portal but would like to know if you recommend that he start a medication to help lower his cholesterol levels or should he continue to try lowering his levels by diet/excercise at this time. He also stated, he has a family history of elevated cholesterol. Please advise.

## 2020-10-15 NOTE — TELEPHONE ENCOUNTER
----- Message from Princess PARTH Gonzalez sent at 10/15/2020  9:28 AM CDT -----  Contact: pt  Type:  Patient Returning Call    Who Called:  patient   Who Left Message for Patient:  Dr. Cast   Does the patient know what this is regarding?:  yes  Best Call Back Number:  [phone] (home)     Additional Information:

## 2020-11-14 ENCOUNTER — IMMUNIZATION (OUTPATIENT)
Dept: FAMILY MEDICINE | Facility: CLINIC | Age: 35
End: 2020-11-14
Payer: COMMERCIAL

## 2020-11-14 PROCEDURE — 90471 IMMUNIZATION ADMIN: CPT | Mod: S$GLB,,, | Performed by: FAMILY MEDICINE

## 2020-11-14 PROCEDURE — 90471 FLU VACCINE (QUAD) GREATER THAN OR EQUAL TO 3YO PRESERVATIVE FREE IM: ICD-10-PCS | Mod: S$GLB,,, | Performed by: FAMILY MEDICINE

## 2020-11-14 PROCEDURE — 90686 IIV4 VACC NO PRSV 0.5 ML IM: CPT | Mod: S$GLB,,, | Performed by: FAMILY MEDICINE

## 2020-11-14 PROCEDURE — 90686 FLU VACCINE (QUAD) GREATER THAN OR EQUAL TO 3YO PRESERVATIVE FREE IM: ICD-10-PCS | Mod: S$GLB,,, | Performed by: FAMILY MEDICINE

## 2020-11-17 ENCOUNTER — SPECIALTY PHARMACY (OUTPATIENT)
Dept: PHARMACY | Facility: CLINIC | Age: 35
End: 2020-11-17

## 2020-11-17 NOTE — TELEPHONE ENCOUNTER
Call to patient to explain that the Odactra copay card has changed and copay is now $50 per fill. Medication has been shipped for today. No answer, LVM.     Gato Ventura, PharmD  Clinical Pharmacist  [facility]  P: [phone]

## 2020-11-17 NOTE — TELEPHONE ENCOUNTER
Patient returned call, explained to patient that the coupon card no longer makes copay $25. Patient acknowledged, he is good with paying this copay. No other questions or concerns.     Gato Ventura, PharmD  Clinical Pharmacist  [facility]  P: [phone]

## 2020-11-17 NOTE — TELEPHONE ENCOUNTER
Specialty Pharmacy - Refill Coordination    Specialty Medication Orders Linked to Encounter      Most Recent Value   Medication #1  allerg xt,D.farinae-D.pteronys (ODACTRA) 12 SQ-HDM Subl (Order#[id number], Rx#[id number])          Refill Questions - Documented Responses      Most Recent Value   Relationship to patient of person spoken to?  Self   HIPAA/medical authority confirmed?  Yes   Any changes in contact preferences or allowed representatives?  No   Has the patient had any insurance changes?  No   Has the patient had any changes to specialty medication, dose, or instructions?  No   Has the patient started taking any new medications, herbals, or supplements?  No   Has the patient been diagnosed with any new medical conditions?  No   Does the patient have any new allergies to medications or foods?  No   Does the patient have any concerns about side effects?  No   Can the patient store medication/sharps container properly (at the correct temperature, away from children/pets, etc.)?  Yes   Can the patient call emergency services (911) in the event of an emergency?  Yes   Does the patient have any concerns or questions about taking or administering this medication as prescribed?  No   How many doses did the patient miss in the past 4 weeks or since the last fill?  0   How many doses does the patient have on hand?  0   How many days does the patient report on hand quantity will last?  0   Does the number of doses/days supply remaining match pharmacy expected amounts?  Yes   Does the patient feel that this medication is effective?  Yes   During the past 4 weeks, has patient missed any activities due to condition or medication?  No   During the past 4 weeks, did patient have any of the following urgent care visits?  None   How will the patient receive the medication?  Mail   When does the patient need to receive the medication?  11/18/20   Shipping Address  Home   Address in [facility] confirmed and updated if  neccessary?  Yes   Expected Copay ($)  50   Is the patient able to afford the medication copay?  Yes   Payment Method  zero copay   Days supply of Refill  30   Would patient like to speak to a pharmacist?  No   Do you want to trigger an intervention?  No   Do you want to trigger an additional referral task?  No   Refill activity completed?  Yes   Refill activity plan  Refill scheduled          Current Outpatient Medications   Medication Sig    allerg xt,D.farinae-D.pteronys (ODACTRA) 12 SQ-HDM Subl Place 1 tablet under the tongue once daily.    betamethasone dipropionate (DIPROLENE) 0.05 % cream Apply topically 2 (two) times daily as needed. Can use with gloves at bedtime    cetirizine (ZYRTEC) 10 MG tablet Take 10 mg by mouth once daily.    crisaborole (EUCRISA) 2 % Oint AAA bid.  Non-steroid.  Safe to use long-term    EPINEPHrine (EPIPEN) 0.3 mg/0.3 mL AtIn Inject 0.3 mLs (0.3 mg total) into the muscle once. for 1 dose    escitalopram oxalate (LEXAPRO) 20 MG tablet Take 1 tablet (20 mg total) by mouth once daily.    fluticasone (FLONASE) 50 mcg/actuation nasal spray 1 spray by Each Nare route once daily.   Last reviewed on 8/7/2020 11:51 AM by Ct Mendoza MA    Review of patient's allergies indicates:  No Known Allergies Last reviewed on  8/7/2020 11:50 AM by Ct Mendoza      Tasks added this encounter   12/10/2020 - Refill Call (Auto Added)   Tasks due within next 3 months   11/30/2020 - Clinical - Follow Up Assesement (180 day)     Gato Ventura PharmD  [facility]  [address] [place] [id number]  Phone: [phone]  Fax: [phone]

## 2020-11-17 NOTE — TELEPHONE ENCOUNTER
Incoming return call from this patient Denny Atkinson MRN [id number] for his Odactra refill. Refill NOT completed due to coupon card savings not applied. Copay with only  primary  insurance is $50. (whereas with coupon applied it has brought down to $25). Patient would like us to look into this. If we can secure savings, patient is okay with shipping either today or tomorrow and I verified address on file. If we cannot secure any further savings he would like a call back. Forwarded to FA team and pharmacist team member Gato for further follow-up.

## 2020-12-04 ENCOUNTER — SPECIALTY PHARMACY (OUTPATIENT)
Dept: PHARMACY | Facility: CLINIC | Age: 35
End: 2020-12-04

## 2020-12-04 NOTE — TELEPHONE ENCOUNTER
Specialty Pharmacy - Clinical Reassessment    Specialty Medication Orders Linked to Encounter      Most Recent Value   Medication #1  allerg xt,D.farinae-D.pteronys (ODACTRA) 12 SQ-HDM Subl (Order#[id number], Rx#[id number])        Subjective    Denny Atkinson is a 35 y.o. male, who is followed by the specialty pharmacy service for management and education.    Recent Encounters     Date Type Provider Description    12/04/2020 Specialty Pharmacy Gato Ventura, Naseem Follow-up Clinical Reassessment    11/17/2020 Specialty Pharmacy Elena Yi, Naseem Refill Coordination        Clinical call attempts since last clinical assessment   No call attempts found.     Today he received follow up education for his specialty medication(s).    Current Outpatient Medications   Medication Sig    allerg xt,D.farinae-D.pteronys (ODACTRA) 12 SQ-HDM Subl Place 1 tablet under the tongue once daily.    betamethasone dipropionate (DIPROLENE) 0.05 % cream Apply topically 2 (two) times daily as needed. Can use with gloves at bedtime    cetirizine (ZYRTEC) 10 MG tablet Take 10 mg by mouth once daily.    crisaborole (EUCRISA) 2 % Oint AAA bid.  Non-steroid.  Safe to use long-term    EPINEPHrine (EPIPEN) 0.3 mg/0.3 mL AtIn Inject 0.3 mLs (0.3 mg total) into the muscle once. for 1 dose    escitalopram oxalate (LEXAPRO) 20 MG tablet Take 1 tablet (20 mg total) by mouth once daily.    fluticasone (FLONASE) 50 mcg/actuation nasal spray 1 spray by Each Nare route once daily.   Last reviewed on 8/7/2020 11:51 AM by Ct Mendoza MA    Review of patient's allergies indicates:  No Known AllergiesLast reviewed on  8/7/2020 11:50 AM by Ct Mendoza    Drug Interactions    Drug interactions evaluated: yes  Clinically relevant drug interactions identified: no  Provided the patient with educational material regarding drug interactions: not applicable       Medication Adherence    Patient reported X missed doses in the last month: 0  Any  "gaps in refill history greater than 2 weeks in the last 3 months: no  Demonstrates understanding of importance of adherence: yes  Informant: patient  Reliability of informant: reliable  Provider-estimated medication adherence level: %  Reasons for non-adherence: no problems identified  Adherence tools used: cell phone  Support network for adherence: family member  Confirmed plan for next specialty medication refill: delivery by pharmacy  Refills needed for supportive medications: not needed       Adverse Effects    *All other systems reviewed and are negative       Assessment Questions - Documented Responses      Most Recent Value   Assessment   Medication Reconciliation completed for patient  Yes   During the past 4 weeks, has patient missed any activities due to condition or medication?  No   During the past 4 weeks, did patient have any of the following urgent care visits?  None   Goals of Therapy Status  Achieving   Welcome packet contents reviewed and discussed with patient?  No   Assesment completed?  No   Plan  Therapy continued   Do you need to open a clinical intervention (i-vent)?  No   Do you want to schedule first shipment?  No          Objective    He has a past medical history of Allergy and Anxiety.    BP Readings from Last 4 Encounters:   08/07/20 112/80   02/18/19 113/72     Ht Readings from Last 4 Encounters:   08/07/20 6' 2" (1.88 m)   04/23/19 6' 2" (1.88 m)   03/19/19 6' 2" (1.88 m)   03/12/19 6' 2" (1.88 m)     Wt Readings from Last 4 Encounters:   08/07/20 106.3 kg (234 lb 5.6 oz)   04/23/19 102.4 kg (225 lb 12 oz)   03/19/19 102.4 kg (225 lb 12 oz)   03/12/19 102.4 kg (225 lb 13.8 oz)       The goals of prescribed drug therapy management include:  · Supporting patient to meet the prescriber's medical treatment objectives  · Improving or maintaining quality of life  · Maintaining optimal therapy adherence  · Minimizing and managing side effects      Goals of Therapy Status: " Achieving    Assessment/Plan  Patient plans to continue therapy without changes      Indication, dosage, appropriateness, effectiveness, safety and convenience of his specialty medication(s) were reviewed today.     Patient Counseling    Counseled the patient on the following: doses and administration discussed, safe handling, storage, and disposal discussed, possible adverse effects and management discussed, possible drug and prescription drug interactions discussed, possible drug and OTC drug and food interactions discussed, therapeutic rationale discussed, cost of medications and cost implications discussed, adherence and missed doses discussed, pharmacy contact information discussed       Patient states that he is very happy with Odactra therapy. He reports no side effects or issues with the medication. He states that he still has an Epipen on hand that he stores in the same drawer as his Odactra. He is still taking a daily Zyrtec and Flonase. Patient feels that therapy is effective. No other questions or concerns.     Tasks added this encounter   5/26/2021 - Clinical - Follow Up Assesement (180 day)   Tasks due within next 3 months   12/10/2020 - Refill Call (Auto Added)     Gato Ventura PharmD  [facility]  [address] [place] [id number]  Phone: [phone]  Fax: [phone]

## 2020-12-11 ENCOUNTER — SPECIALTY PHARMACY (OUTPATIENT)
Dept: PHARMACY | Facility: CLINIC | Age: 35
End: 2020-12-11

## 2020-12-11 NOTE — TELEPHONE ENCOUNTER
Specialty Pharmacy - Refill Coordination    Specialty Medication Orders Linked to Encounter      Most Recent Value   Medication #1  allerg xt,D.farinae-DDarrenpteronys (ODACTRA) 12 SQ-HDM Subl (Order#[id number], Rx#[id number])          Refill Questions - Documented Responses      Most Recent Value   Relationship to patient of person spoken to?  Self   HIPAA/medical authority confirmed?  Yes   Any changes in contact preferences or allowed representatives?  No   Has the patient had any insurance changes?  No   Has the patient had any changes to specialty medication, dose, or instructions?  No   Has the patient started taking any new medications, herbals, or supplements?  No   Has the patient been diagnosed with any new medical conditions?  No   Does the patient have any new allergies to medications or foods?  No   Does the patient have any concerns about side effects?  No   Can the patient store medication/sharps container properly (at the correct temperature, away from children/pets, etc.)?  Yes   Can the patient call emergency services (911) in the event of an emergency?  Yes   Does the patient have any concerns or questions about taking or administering this medication as prescribed?  No   How many doses does the patient have on hand?  7   How many days does the patient report on hand quantity will last?  7   Does the number of doses/days supply remaining match pharmacy expected amounts?  Yes   How will the patient receive the medication?  Mail   When does the patient need to receive the medication?  12/18/20   Shipping Address  Home   Address in [facility] confirmed and updated if neccessary?  Yes   Expected Copay ($)  50   Is the patient able to afford the medication copay?  Yes   Payment Method  CC on file   Days supply of Refill  30   Would patient like to speak to a pharmacist?  No   Do you want to trigger an intervention?  No   Do you want to trigger an additional referral task?  No   Refill activity  completed?  Yes   Refill activity plan  Refill scheduled   Shipment/Pickup Date:  12/14/20          Current Outpatient Medications   Medication Sig    allerg xt,D.farinae-D.pteronys (ODACTRA) 12 SQ-HDM Subl Place 1 tablet under the tongue once daily.    betamethasone dipropionate (DIPROLENE) 0.05 % cream Apply topically 2 (two) times daily as needed. Can use with gloves at bedtime    cetirizine (ZYRTEC) 10 MG tablet Take 10 mg by mouth once daily.    crisaborole (EUCRISA) 2 % Oint AAA bid.  Non-steroid.  Safe to use long-term    EPINEPHrine (EPIPEN) 0.3 mg/0.3 mL AtIn Inject 0.3 mLs (0.3 mg total) into the muscle once. for 1 dose    escitalopram oxalate (LEXAPRO) 20 MG tablet Take 1 tablet (20 mg total) by mouth once daily.    fluticasone (FLONASE) 50 mcg/actuation nasal spray 1 spray by Each Nare route once daily.   Last reviewed on 8/7/2020 11:51 AM by Ct Mendoza MA    Review of patient's allergies indicates:  No Known Allergies Last reviewed on  8/7/2020 11:50 AM by Ct Mendoza      Tasks added this encounter   1/8/2021 - Refill Call (Auto Added)   Tasks due within next 3 months   No tasks due.     [facility]  [address] [place] [id number]  Phone: [phone]  Fax: [phone]

## 2020-12-28 ENCOUNTER — PATIENT MESSAGE (OUTPATIENT)
Dept: FAMILY MEDICINE | Facility: CLINIC | Age: 35
End: 2020-12-28

## 2020-12-28 DIAGNOSIS — Z76.0 MEDICATION REFILL: ICD-10-CM

## 2021-01-03 RX ORDER — ESCITALOPRAM OXALATE 20 MG/1
20 TABLET ORAL DAILY
Qty: 90 TABLET | Refills: 3 | Status: SHIPPED | OUTPATIENT
Start: 2021-01-03 | End: 2021-11-29 | Stop reason: SDUPTHER

## 2021-01-15 ENCOUNTER — SPECIALTY PHARMACY (OUTPATIENT)
Dept: PHARMACY | Facility: CLINIC | Age: 36
End: 2021-01-15

## 2021-01-15 DIAGNOSIS — J30.89 ALLERGIC RHINITIS DUE TO DUST MITE: ICD-10-CM

## 2021-01-15 DIAGNOSIS — J30.1 CHRONIC ALLERGIC RHINITIS DUE TO POLLEN: ICD-10-CM

## 2021-01-24 NOTE — TELEPHONE ENCOUNTER
Patient notified he needed an annual appointment for refill. Appointment made for 4/2/2020.    Pt began the day withdrawn to his room and anxious about going back to work with his boss  Pt refused breakfast and lunch  Pt continued to report that he was overall tired and needed sleep  Pt was informed of medications to help aid sleep disturbance  And was agreeable  Pt has since come out of his room and has been more social and less anxious  Pt denies any current si/hi at this time  Will continue to monitor

## 2021-02-19 ENCOUNTER — SPECIALTY PHARMACY (OUTPATIENT)
Dept: PHARMACY | Facility: CLINIC | Age: 36
End: 2021-02-19

## 2021-04-10 DIAGNOSIS — J30.89 ALLERGIC RHINITIS DUE TO DUST MITE: ICD-10-CM

## 2021-04-10 DIAGNOSIS — J30.1 CHRONIC ALLERGIC RHINITIS DUE TO POLLEN: ICD-10-CM

## 2021-04-10 RX ORDER — DERMATOPHAGOIDES PTERONYSSINUS AND DERMATOPHAGOIDES FARINAE 6; 6 [ARB'U]/1; [ARB'U]/1
1 TABLET SUBLINGUAL DAILY
Qty: 30 TABLET | Refills: 11 | Status: CANCELLED | OUTPATIENT
Start: 2021-04-10 | End: 2021-05-10

## 2021-04-13 ENCOUNTER — SPECIALTY PHARMACY (OUTPATIENT)
Dept: PHARMACY | Facility: CLINIC | Age: 36
End: 2021-04-13

## 2021-04-13 RX ORDER — DERMATOPHAGOIDES PTERONYSSINUS AND DERMATOPHAGOIDES FARINAE 6; 6 [ARB'U]/1; [ARB'U]/1
1 TABLET SUBLINGUAL DAILY
Qty: 30 TABLET | Refills: 11 | Status: SHIPPED | OUTPATIENT
Start: 2021-04-13 | End: 2021-05-14

## 2021-04-29 ENCOUNTER — PATIENT MESSAGE (OUTPATIENT)
Dept: RESEARCH | Facility: HOSPITAL | Age: 36
End: 2021-04-29

## 2021-05-12 ENCOUNTER — PATIENT MESSAGE (OUTPATIENT)
Dept: PHARMACY | Facility: CLINIC | Age: 36
End: 2021-05-12

## 2021-05-21 ENCOUNTER — PATIENT MESSAGE (OUTPATIENT)
Dept: PHARMACY | Facility: CLINIC | Age: 36
End: 2021-05-21

## 2021-05-26 ENCOUNTER — SPECIALTY PHARMACY (OUTPATIENT)
Dept: PHARMACY | Facility: CLINIC | Age: 36
End: 2021-05-26

## 2021-05-26 ENCOUNTER — PATIENT MESSAGE (OUTPATIENT)
Dept: PHARMACY | Facility: CLINIC | Age: 36
End: 2021-05-26

## 2021-06-21 ENCOUNTER — PATIENT MESSAGE (OUTPATIENT)
Dept: FAMILY MEDICINE | Facility: CLINIC | Age: 36
End: 2021-06-21

## 2021-07-09 ENCOUNTER — OFFICE VISIT (OUTPATIENT)
Dept: FAMILY MEDICINE | Facility: CLINIC | Age: 36
End: 2021-07-09
Payer: COMMERCIAL

## 2021-07-09 VITALS
OXYGEN SATURATION: 98 % | BODY MASS INDEX: 30.44 KG/M2 | SYSTOLIC BLOOD PRESSURE: 120 MMHG | DIASTOLIC BLOOD PRESSURE: 76 MMHG | HEIGHT: 74 IN | TEMPERATURE: 98 F | HEART RATE: 56 BPM | WEIGHT: 237.19 LBS

## 2021-07-09 DIAGNOSIS — F42.2 MIXED OBSESSIONAL THOUGHTS AND ACTS: ICD-10-CM

## 2021-07-09 DIAGNOSIS — F41.9 ANXIETY: Primary | ICD-10-CM

## 2021-07-09 PROCEDURE — 99999 PR PBB SHADOW E&M-EST. PATIENT-LVL IV: CPT | Mod: PBBFAC,,, | Performed by: NURSE PRACTITIONER

## 2021-07-09 PROCEDURE — 99213 PR OFFICE/OUTPT VISIT, EST, LEVL III, 20-29 MIN: ICD-10-PCS | Mod: S$GLB,,, | Performed by: NURSE PRACTITIONER

## 2021-07-09 PROCEDURE — 99213 OFFICE O/P EST LOW 20 MIN: CPT | Mod: S$GLB,,, | Performed by: NURSE PRACTITIONER

## 2021-07-09 PROCEDURE — 3008F PR BODY MASS INDEX (BMI) DOCUMENTED: ICD-10-PCS | Mod: S$GLB,,, | Performed by: NURSE PRACTITIONER

## 2021-07-09 PROCEDURE — 1126F AMNT PAIN NOTED NONE PRSNT: CPT | Mod: S$GLB,,, | Performed by: NURSE PRACTITIONER

## 2021-07-09 PROCEDURE — 3008F BODY MASS INDEX DOCD: CPT | Mod: S$GLB,,, | Performed by: NURSE PRACTITIONER

## 2021-07-09 PROCEDURE — 1126F PR PAIN SEVERITY QUANTIFIED, NO PAIN PRESENT: ICD-10-PCS | Mod: S$GLB,,, | Performed by: NURSE PRACTITIONER

## 2021-07-09 PROCEDURE — 99999 PR PBB SHADOW E&M-EST. PATIENT-LVL IV: ICD-10-PCS | Mod: PBBFAC,,, | Performed by: NURSE PRACTITIONER

## 2021-07-18 ENCOUNTER — OFFICE VISIT (OUTPATIENT)
Dept: FAMILY MEDICINE | Facility: CLINIC | Age: 36
End: 2021-07-18
Payer: COMMERCIAL

## 2021-07-18 DIAGNOSIS — B34.9 VIRAL INFECTION: Primary | ICD-10-CM

## 2021-07-18 PROCEDURE — 99442 PR PHYSICIAN TELEPHONE EVALUATION 11-20 MIN: ICD-10-PCS | Mod: 95,,, | Performed by: PHYSICIAN ASSISTANT

## 2021-07-18 PROCEDURE — 99442 PR PHYSICIAN TELEPHONE EVALUATION 11-20 MIN: CPT | Mod: 95,,, | Performed by: PHYSICIAN ASSISTANT

## 2021-07-18 RX ORDER — PREDNISONE 10 MG/1
TABLET ORAL
Qty: 18 TABLET | Refills: 0 | Status: SHIPPED | OUTPATIENT
Start: 2021-07-18 | End: 2022-01-26

## 2021-07-18 RX ORDER — CYCLOBENZAPRINE HCL 10 MG
10 TABLET ORAL 3 TIMES DAILY PRN
Qty: 15 TABLET | Refills: 0 | Status: SHIPPED | OUTPATIENT
Start: 2021-07-18 | End: 2021-07-28

## 2021-10-22 ENCOUNTER — PATIENT MESSAGE (OUTPATIENT)
Dept: FAMILY MEDICINE | Facility: CLINIC | Age: 36
End: 2021-10-22
Payer: COMMERCIAL

## 2021-11-17 ENCOUNTER — IMMUNIZATION (OUTPATIENT)
Dept: PRIMARY CARE CLINIC | Facility: CLINIC | Age: 36
End: 2021-11-17
Payer: COMMERCIAL

## 2021-11-17 DIAGNOSIS — Z23 NEED FOR VACCINATION: Primary | ICD-10-CM

## 2021-11-17 PROCEDURE — 91301 COVID-19, MRNA, LNP-S, PF, 100 MCG/0.5 ML DOSE VACCINE: ICD-10-PCS | Mod: S$GLB,,, | Performed by: FAMILY MEDICINE

## 2021-11-17 PROCEDURE — 0013A PR IMMUNIZ ADMIN, SARS-COV-2 COVID-19 VACC, 100MCG/0.5ML, 3RD DOSE: CPT | Mod: S$GLB,,, | Performed by: FAMILY MEDICINE

## 2021-11-17 PROCEDURE — 91301 COVID-19, MRNA, LNP-S, PF, 100 MCG/0.5 ML DOSE VACCINE: CPT | Mod: S$GLB,,, | Performed by: FAMILY MEDICINE

## 2021-11-17 PROCEDURE — 0013A PR IMMUNIZ ADMIN, SARS-COV-2 COVID-19 VACC, 100MCG/0.5ML, 3RD DOSE: ICD-10-PCS | Mod: S$GLB,,, | Performed by: FAMILY MEDICINE

## 2021-11-29 DIAGNOSIS — Z76.0 MEDICATION REFILL: ICD-10-CM

## 2021-11-29 RX ORDER — ESCITALOPRAM OXALATE 20 MG/1
20 TABLET ORAL DAILY
Qty: 90 TABLET | Refills: 3 | Status: SHIPPED | OUTPATIENT
Start: 2021-11-29 | End: 2022-03-09

## 2022-01-26 ENCOUNTER — OFFICE VISIT (OUTPATIENT)
Dept: PSYCHIATRY | Facility: CLINIC | Age: 37
End: 2022-01-26
Payer: COMMERCIAL

## 2022-01-26 VITALS
DIASTOLIC BLOOD PRESSURE: 82 MMHG | WEIGHT: 240.19 LBS | HEART RATE: 69 BPM | SYSTOLIC BLOOD PRESSURE: 118 MMHG | BODY MASS INDEX: 30.83 KG/M2 | HEIGHT: 74 IN

## 2022-01-26 DIAGNOSIS — F42.2 MIXED OBSESSIONAL THOUGHTS AND ACTS: Primary | ICD-10-CM

## 2022-01-26 DIAGNOSIS — F41.9 ANXIETY: ICD-10-CM

## 2022-01-26 PROCEDURE — 99999 PR PBB SHADOW E&M-EST. PATIENT-LVL IV: CPT | Mod: PBBFAC,,, | Performed by: PHYSICIAN ASSISTANT

## 2022-01-26 PROCEDURE — 3074F PR MOST RECENT SYSTOLIC BLOOD PRESSURE < 130 MM HG: ICD-10-PCS | Mod: CPTII,S$GLB,, | Performed by: PHYSICIAN ASSISTANT

## 2022-01-26 PROCEDURE — 3079F DIAST BP 80-89 MM HG: CPT | Mod: CPTII,S$GLB,, | Performed by: PHYSICIAN ASSISTANT

## 2022-01-26 PROCEDURE — 1159F MED LIST DOCD IN RCRD: CPT | Mod: CPTII,S$GLB,, | Performed by: PHYSICIAN ASSISTANT

## 2022-01-26 PROCEDURE — 1160F PR REVIEW ALL MEDS BY PRESCRIBER/CLIN PHARMACIST DOCUMENTED: ICD-10-PCS | Mod: CPTII,S$GLB,, | Performed by: PHYSICIAN ASSISTANT

## 2022-01-26 PROCEDURE — 3079F PR MOST RECENT DIASTOLIC BLOOD PRESSURE 80-89 MM HG: ICD-10-PCS | Mod: CPTII,S$GLB,, | Performed by: PHYSICIAN ASSISTANT

## 2022-01-26 PROCEDURE — 99999 PR PBB SHADOW E&M-EST. PATIENT-LVL IV: ICD-10-PCS | Mod: PBBFAC,,, | Performed by: PHYSICIAN ASSISTANT

## 2022-01-26 PROCEDURE — 90792 PSYCH DIAG EVAL W/MED SRVCS: CPT | Mod: S$GLB,,, | Performed by: PHYSICIAN ASSISTANT

## 2022-01-26 PROCEDURE — 3008F PR BODY MASS INDEX (BMI) DOCUMENTED: ICD-10-PCS | Mod: CPTII,S$GLB,, | Performed by: PHYSICIAN ASSISTANT

## 2022-01-26 PROCEDURE — 1159F PR MEDICATION LIST DOCUMENTED IN MEDICAL RECORD: ICD-10-PCS | Mod: CPTII,S$GLB,, | Performed by: PHYSICIAN ASSISTANT

## 2022-01-26 PROCEDURE — 3074F SYST BP LT 130 MM HG: CPT | Mod: CPTII,S$GLB,, | Performed by: PHYSICIAN ASSISTANT

## 2022-01-26 PROCEDURE — 1160F RVW MEDS BY RX/DR IN RCRD: CPT | Mod: CPTII,S$GLB,, | Performed by: PHYSICIAN ASSISTANT

## 2022-01-26 PROCEDURE — 3008F BODY MASS INDEX DOCD: CPT | Mod: CPTII,S$GLB,, | Performed by: PHYSICIAN ASSISTANT

## 2022-01-26 PROCEDURE — 90792 PR PSYCHIATRIC DIAGNOSTIC EVALUATION W/MEDICAL SERVICES: ICD-10-PCS | Mod: S$GLB,,, | Performed by: PHYSICIAN ASSISTANT

## 2022-01-26 NOTE — PROGRESS NOTES
Outpatient Psychiatry Initial Visit (MD/NP)    1/26/2022    Denny Atkinson, a 37 y.o. male, presenting for initial evaluation visit. Met with patient.    Reason for Encounter: Referral from Ben Dos Santos. Patient complains of OCD/anxiety.    History of Present Illness:   This is a 37-year-old male, past medical history of hyperlipidemia, who presents today for initial evaluation.  He reports that he is here due to significant amount of anxiety in particular obsessive-compulsive related symptoms.  He states he is obsessively worrying.  He is a  and manages many graduates students.  He reports this is a very high stress/high pressure job.  His wife is a professor as well.  He has a 2-year-old girl, this is their first child. He has been on escitalopram for many years.  He states that is been working pretty well but he still does endorse a significant amount of anxiety.  Has trouble with sleep.  He does state that he drinks too much coffee and also uses Nicorette gum is a stimulant.  He states he used to play basketball 3-4 nights per week prior to COVID and this helped him sleep much more.  He has been exercising some but it is not enough to help him with his sleep.  He states this was a major stress release.  He has struggled for a while with tasks at his job.  He endorses ruminating thoughts.  Will have detailed notes that he has to reread and add to them.  It is impacting his functioning at this time.  When he was younger, he used to do repetitive handwashing but is not doing this now.  He does find that his symptoms are impacting his day-to-day life.  Medications that he has tried include bupropion, sertraline, fluoxetine, paroxetine.  He reports that he did have symptoms of serotonin toxicity during a cross taper of medications.  He states he is sensitive to medications.  Has not been on tricyclic antidepressants or Luvox.  Unsure about trazodone.  He previously participated in therapy for  many years, interested in therapy referral today.  Discussed sleep hygiene in detail.  Denies suicidal or homicidal ideation.  No other complaints today.    Endorses prior problems with substance use disorders - does not want to be prescribed anything habit forming.    Depression symptoms:  Endorses difficulty with sleep, feeling tired or having little energy, overeating, trouble concentrating.  Denies suicidal ideation.  PHQ-9 seven, somewhat difficult    Anxiety symptoms:  GAD7 1/27/2022   1. Feeling nervous, anxious, or on edge? 1   2. Not being able to stop or control worrying? 3   3. Worrying too much about different things? 3   4. Trouble relaxing? 2   5. Being so restless that it is hard to sit still? 0   6. Becoming easily annoyed or irritable? 1   7. Feeling afraid as if something awful might happen? 0   8. If you checked off any problems, how difficult have these problems made it for you to do your work, take care of things at home, or get along with other people? 1   MAHESH-7 Score 10         Fina/Hypomania symptoms: denies  MDQ Scale 1/27/2022   you felt so good or so hyper that other people thought you were not your normal self or you were so hyper that you got into trouble? 0   you were so irritable that you shouted at people or started fights or arguments? 0   you felt much more self-confident than usual? 0   you got much less sleep than usual and found that you didn't really miss it? 0   you were more talkative or spoke much faster than usual? 0   thoughts raced through your head or you couldn't slow your mind down? 1   you were so easily distracted by things around you that you had trouble concentrating or staying on track? 1   you had more energy than usual? 0   you were much more active or did many more things than usual? 0   you were much more social or outgoing than usual, for example, you telephoned friends in the middle of the night? 0   you were much more interested in sex than usual? 0   you  did things that were unusual for you or that other people might have thought were excessive, foolish, or risky? 0   spending money got you or your family in trouble? 0   If you checked YES to more than one of the above, have several of these ever happened during the same period of time? 1   How much of a problem did any of these cause you - like being unable to work; having family, money or legal troubles; getting into arguments or fights? Moderate problem   Mood Disorder Questionnaire Score  2     Psychosis: denies    Attention/Concentration: fair    Body Image/Hx of eating disorders: denies, eating too much occasionally     Suicidal ideation and risk: denies suicidal thoughts, no access to guns, no hx of suicidal thoughts or attempts    Homicidal/Violient ideation and risk: denies    Sleep: poor sleep hygiene    Appetite: overeating    Past Psychiatric History:  Prior diagnoses: OCD, MAHESH, never been diagnosed with ADHD     Inpatient psychiatric treatment: denies    Outpatient psychiatric treatment: last saw psychiatrist, saw someone before Dr. Cast. Four or so years ago. Wasn't really thrilled. Was happy just on lexapro.     Prior medications: as described above    Current medications: lexapro 20mg daily    Prior suicide attempts: denies    Prior history self harm: denies    Prior psychotherapy: has participated in the past    Prior psychological testing: none    Allergies:  Review of patient's allergies indicates:   Allergen Reactions    Center-al house dust      Past Medical History:  Past Medical History:   Diagnosis Date    Allergy     Anxiety    Elevated cholesterol - not taking medicine     History TBI: denies  History seizures: denies    Past Surgical History:  Past Surgical History:   Procedure Laterality Date    FINGER SURGERY  2007    HERNIA REPAIR  1985     Family History:   Suicide: denies  Substance use: great grandfather   Bipolar disorder/Psychotic disorder: denies  Anxiety: brother, mom and  dad  Depression: denies    Social History:  Childhood: born in [place]. Grew up in [place]. Went to college in [employer]. Raised by biological mother and father. Good relationship. They live in [place], [place]. One brother in [place] - younger brother. He's doing well. He has some anxiety, definitely not OCD.   Marital status: has been  for almost 10 years, supportive wife   Children: 2 year old daughter, August   Resides: in [place], [place]  Occupation: professor - geology   Hobbies: basketball, enjoys walking/hiking with the family, riding bikes, likes to be outside  Hoahaoism: not religlious   Education level: PhD  : denies  Legal: denies  History of abuse/trauma: denies    Substance History:  Tobacco: nicorette gum - used to smoke cigarettes, quit smoking cigarettes probably 12 years ago. Gum - 3 or 4 pieces of day - at night.   Alcohol: no alcohol, when he was in high school would drink a lot. Has addictive personality.   Drug use: high school - opioids, not stimulants, used benzos, had to go treatment.   Caffeine: high caffeine - one of those yeti of coffee - make at home, community coffee     Rehab:  Prior/current AA: inpatient detox/rehab 20 years ago - several times between [age], before college     Review Of Systems:     GENERAL:  Reports weight gain during pandemic   SKIN:  No rashes or lacerations  HEAD:  No headaches  EYES:  No exophthalmos, jaundice or blindness  EARS:  No dizziness, tinnitus or hearing loss  NOSE:  No changes in smell  MOUTH & THROAT:  No dyskinetic movements or obvious goiter  CHEST:  No shortness of breath, hyperventilation or cough  CARDIOVASCULAR:  No tachycardia or chest pain  ABDOMEN:  No nausea, vomiting, pain, constipation or diarrhea  URINARY:  No frequency, dysuria or sexual dysfunction  ENDOCRINE:  No polydipsia, polyuria  MUSCULOSKELETAL:  No pain or stiffness of the joints  NEUROLOGIC:  No weakness, sensory changes, seizures, confusion, memory loss, tremor or other abnormal  movements    Current Evaluation:     Nutritional Screening: Considering the patient's height and weight, medications, medical history and preferences, should a referral be made to the dietitian? no    Constitutional  Vitals:  Most recent vital signs, dated less than 90 days prior to this appointment, were reviewed.    Vitals:    01/26/22 0854   BP: 118/82   Pulse: 69        General:  unremarkable, age appropriate     Musculoskeletal  Muscle Strength/Tone:  no spasicity, no rigidity   Gait & Station:  non-ataxic     Psychiatric  Speech:  no latency; no press   Mood & Affect:  good  congruent and appropriate   Thought Process:  normal and logical   Associations:  intact   Thought Content:  normal, no suicidality, no homicidality, delusions, or paranoia   Insight:  intact   Judgement: behavior is adequate to circumstances   Orientation:  grossly intact   Memory: intact for content of interview   Language: grossly intact   Attention Span & Concentration:  able to focus   Fund of Knowledge:  intact and appropriate to age and level of education       Relevant Elements of Neurological Exam: normal gait    Functioning in Relationships:  Spouse/partner: supportive wife  Peers: supportive friends   Employers: works as a professor    Laboratory Data  No visits with results within 1 Month(s) from this visit.   Latest known visit with results is:   Lab Visit on 08/11/2020   Component Date Value Ref Range Status    Cholesterol 08/11/2020 268* 120 - 199 mg/dL Final    Triglycerides 08/11/2020 224* 30 - 150 mg/dL Final    HDL 08/11/2020 38* 40 - 75 mg/dL Final    LDL Cholesterol 08/11/2020 185.2* 63.0 - 159.0 mg/dL Final    HDL/Cholesterol Ratio 08/11/2020 14.2* 20.0 - 50.0 % Final    Total Cholesterol/HDL Ratio 08/11/2020 7.1* 2.0 - 5.0 Final    Non-HDL Cholesterol 08/11/2020 230  mg/dL Final    Sodium 08/11/2020 139  136 - 145 mmol/L Final    Potassium 08/11/2020 4.1  3.5 - 5.1 mmol/L Final    Chloride 08/11/2020 104   95 - 110 mmol/L Final    CO2 08/11/2020 26  23 - 29 mmol/L Final    Glucose 08/11/2020 100  70 - 110 mg/dL Final    BUN 08/11/2020 11  6 - 20 mg/dL Final    Creatinine 08/11/2020 0.9  0.5 - 1.4 mg/dL Final    Calcium 08/11/2020 9.9  8.7 - 10.5 mg/dL Final    Total Protein 08/11/2020 7.9  6.0 - 8.4 g/dL Final    Albumin 08/11/2020 4.7  3.5 - 5.2 g/dL Final    Total Bilirubin 08/11/2020 1.1* 0.1 - 1.0 mg/dL Final    Alkaline Phosphatase 08/11/2020 98  55 - 135 U/L Final    AST 08/11/2020 21  10 - 40 U/L Final    ALT 08/11/2020 44  10 - 44 U/L Final    Anion Gap 08/11/2020 9  8 - 16 mmol/L Final    eGFR if African American 08/11/2020 >60.0  >60 mL/min/1.73 m^2 Final    eGFR if non African American 08/11/2020 >60.0  >60 mL/min/1.73 m^2 Final    WBC 08/11/2020 5.76  3.90 - 12.70 K/uL Final    RBC 08/11/2020 4.86  4.60 - 6.20 M/uL Final    Hemoglobin 08/11/2020 14.9  14.0 - 18.0 g/dL Final    Hematocrit 08/11/2020 46.4  40.0 - 54.0 % Final    MCV 08/11/2020 96  82 - 98 fL Final    MCH 08/11/2020 30.7  27.0 - 31.0 pg Final    MCHC 08/11/2020 32.1  32.0 - 36.0 g/dL Final    RDW 08/11/2020 13.0  11.5 - 14.5 % Final    Platelets 08/11/2020 282  150 - 350 K/uL Final    MPV 08/11/2020 10.9  9.2 - 12.9 fL Final    Immature Granulocytes 08/11/2020 0.0  0.0 - 0.5 % Final    Gran # (ANC) 08/11/2020 2.2  1.8 - 7.7 K/uL Final    Immature Grans (Abs) 08/11/2020 0.00  0.00 - 0.04 K/uL Final    Lymph # 08/11/2020 2.9  1.0 - 4.8 K/uL Final    Mono # 08/11/2020 0.5  0.3 - 1.0 K/uL Final    Eos # 08/11/2020 0.2  0.0 - 0.5 K/uL Final    Baso # 08/11/2020 0.02  0.00 - 0.20 K/uL Final    nRBC 08/11/2020 0  0 /100 WBC Final    Gran % 08/11/2020 37.4* 38.0 - 73.0 % Final    Lymph % 08/11/2020 50.3* 18.0 - 48.0 % Final    Mono % 08/11/2020 9.2  4.0 - 15.0 % Final    Eosinophil % 08/11/2020 2.8  0.0 - 8.0 % Final    Basophil % 08/11/2020 0.3  0.0 - 1.9 % Final    Differential Method 08/11/2020 Automated    Final         Medications  Outpatient Encounter Medications as of 1/26/2022   Medication Sig Dispense Refill    betamethasone dipropionate (DIPROLENE) 0.05 % cream Apply topically 2 (two) times daily as needed. Can use with gloves at bedtime 45 g 1    cetirizine (ZYRTEC) 10 MG tablet Take 10 mg by mouth once daily.      crisaborole (EUCRISA) 2 % Oint AAA bid.  Non-steroid.  Safe to use long-term 60 g 1    EPINEPHrine (EPIPEN) 0.3 mg/0.3 mL AtIn Inject 0.3 mLs (0.3 mg total) into the muscle once. for 1 dose 0.3 mL 1    EScitalopram oxalate (LEXAPRO) 20 MG tablet Take 1 tablet (20 mg total) by mouth once daily. 90 tablet 3    fluticasone (FLONASE) 50 mcg/actuation nasal spray 1 spray by Each Nare route once daily.      predniSONE (DELTASONE) 10 MG tablet Take 3 daily for 3 days, then 2 daily for three days, then 1 daily for three days. 18 tablet 0     No facility-administered encounter medications on file as of 1/26/2022.           Assessment - Diagnosis - Goals:     Impression:       ICD-10-CM ICD-9-CM   1. Anxiety  F41.9 300.00   2. Mixed obsessional thoughts and acts  F42.2 300.3     Likely OCD  MAHESH    Strengths and Liabilities: Strength: Patient accepts guidance/feedback, Strength: Patient is expressive/articulate., Strength: Patient is intelligent., Strength: Patient is motivated for change., Strength: Patient is physically healthy., Strength: Patient has positive support network., Strength: Patient has reasonable judgment., Strength: Patient is stable.      Treatment Plan/Recommendations:   · Medication Management: Continue current medications. The risks and benefits of medication were discussed with the patient.  · Referral for further treatment to social work team for psychotherapy  · Labs: ekg  · The treatment plan and follow up plan were reviewed with the patient.    This is a pleasant 37-year-old male who presents for initial evaluation today.  We will complete subsequent diagnostic evaluation at  next visit, going through OCD, ADHD, generalized anxiety screens.  We will complete diagnostic evaluation and then begin to adjust medications.  He is in agreement with this.  Based on assessment:      Continue escitalopram 20 mg daily for anxiety.  Order EKG to assess for future medication trials.    Therapy referral placed.    Please go to emergency department if feeling as though you are a harm to yourself or others or if you are in crisis. Please call the clinic to report any worsening of symptoms or problems associated with medication.    Discussed with patient informed consent, risks vs. benefits, alternative treatments, side effect profile and the inherent unpredictability of individual responses to these treatments. The patient expresses understanding of the above and displays the capacity to agree with this current plan and had no other questions.      Return to Clinic: 1 week, as needed    Counseling time: 30  Total time: 60

## 2022-01-31 ENCOUNTER — TELEPHONE (OUTPATIENT)
Dept: PSYCHIATRY | Facility: CLINIC | Age: 37
End: 2022-01-31
Payer: COMMERCIAL

## 2022-02-02 ENCOUNTER — HOSPITAL ENCOUNTER (OUTPATIENT)
Dept: PREADMISSION TESTING | Facility: HOSPITAL | Age: 37
Discharge: HOME | End: 2022-02-02
Attending: PHYSICIAN ASSISTANT
Payer: COMMERCIAL

## 2022-02-02 DIAGNOSIS — F42.2 MIXED OBSESSIONAL THOUGHTS AND ACTS: ICD-10-CM

## 2022-02-02 PROCEDURE — 93010 ELECTROCARDIOGRAM REPORT: CPT | Mod: ,,, | Performed by: INTERNAL MEDICINE

## 2022-02-02 PROCEDURE — 93005 ELECTROCARDIOGRAM TRACING: CPT | Performed by: INTERNAL MEDICINE

## 2022-02-02 PROCEDURE — 93010 EKG 12-LEAD: ICD-10-PCS | Mod: ,,, | Performed by: INTERNAL MEDICINE

## 2022-02-03 ENCOUNTER — OFFICE VISIT (OUTPATIENT)
Dept: PSYCHIATRY | Facility: CLINIC | Age: 37
End: 2022-02-03
Payer: COMMERCIAL

## 2022-02-03 VITALS
WEIGHT: 241.5 LBS | HEIGHT: 74 IN | SYSTOLIC BLOOD PRESSURE: 137 MMHG | DIASTOLIC BLOOD PRESSURE: 87 MMHG | BODY MASS INDEX: 30.99 KG/M2 | HEART RATE: 70 BPM

## 2022-02-03 DIAGNOSIS — F41.1 GAD (GENERALIZED ANXIETY DISORDER): ICD-10-CM

## 2022-02-03 DIAGNOSIS — F42.9 OBSESSIVE-COMPULSIVE DISORDER, UNSPECIFIED TYPE: Primary | ICD-10-CM

## 2022-02-03 PROCEDURE — 1159F MED LIST DOCD IN RCRD: CPT | Mod: CPTII,S$GLB,, | Performed by: PHYSICIAN ASSISTANT

## 2022-02-03 PROCEDURE — 3075F PR MOST RECENT SYSTOLIC BLOOD PRESS GE 130-139MM HG: ICD-10-PCS | Mod: CPTII,S$GLB,, | Performed by: PHYSICIAN ASSISTANT

## 2022-02-03 PROCEDURE — 3079F PR MOST RECENT DIASTOLIC BLOOD PRESSURE 80-89 MM HG: ICD-10-PCS | Mod: CPTII,S$GLB,, | Performed by: PHYSICIAN ASSISTANT

## 2022-02-03 PROCEDURE — 90833 PSYTX W PT W E/M 30 MIN: CPT | Mod: S$GLB,,, | Performed by: PHYSICIAN ASSISTANT

## 2022-02-03 PROCEDURE — 1160F RVW MEDS BY RX/DR IN RCRD: CPT | Mod: CPTII,S$GLB,, | Performed by: PHYSICIAN ASSISTANT

## 2022-02-03 PROCEDURE — 3075F SYST BP GE 130 - 139MM HG: CPT | Mod: CPTII,S$GLB,, | Performed by: PHYSICIAN ASSISTANT

## 2022-02-03 PROCEDURE — 1159F PR MEDICATION LIST DOCUMENTED IN MEDICAL RECORD: ICD-10-PCS | Mod: CPTII,S$GLB,, | Performed by: PHYSICIAN ASSISTANT

## 2022-02-03 PROCEDURE — 3008F PR BODY MASS INDEX (BMI) DOCUMENTED: ICD-10-PCS | Mod: CPTII,S$GLB,, | Performed by: PHYSICIAN ASSISTANT

## 2022-02-03 PROCEDURE — 90833 PR PSYCHOTHERAPY W/PATIENT W/E&M, 30 MIN (ADD ON): ICD-10-PCS | Mod: S$GLB,,, | Performed by: PHYSICIAN ASSISTANT

## 2022-02-03 PROCEDURE — 99999 PR PBB SHADOW E&M-EST. PATIENT-LVL III: ICD-10-PCS | Mod: PBBFAC,,, | Performed by: PHYSICIAN ASSISTANT

## 2022-02-03 PROCEDURE — 99214 PR OFFICE/OUTPT VISIT, EST, LEVL IV, 30-39 MIN: ICD-10-PCS | Mod: S$GLB,,, | Performed by: PHYSICIAN ASSISTANT

## 2022-02-03 PROCEDURE — 1160F PR REVIEW ALL MEDS BY PRESCRIBER/CLIN PHARMACIST DOCUMENTED: ICD-10-PCS | Mod: CPTII,S$GLB,, | Performed by: PHYSICIAN ASSISTANT

## 2022-02-03 PROCEDURE — 3079F DIAST BP 80-89 MM HG: CPT | Mod: CPTII,S$GLB,, | Performed by: PHYSICIAN ASSISTANT

## 2022-02-03 PROCEDURE — 99999 PR PBB SHADOW E&M-EST. PATIENT-LVL III: CPT | Mod: PBBFAC,,, | Performed by: PHYSICIAN ASSISTANT

## 2022-02-03 PROCEDURE — 99214 OFFICE O/P EST MOD 30 MIN: CPT | Mod: S$GLB,,, | Performed by: PHYSICIAN ASSISTANT

## 2022-02-03 PROCEDURE — 3008F BODY MASS INDEX DOCD: CPT | Mod: CPTII,S$GLB,, | Performed by: PHYSICIAN ASSISTANT

## 2022-02-03 NOTE — PATIENT INSTRUCTIONS
Luvox  Effexor  Anafranil  Abilify, Risperdal  Patient Education       Fluvoxamine (logan bolanos)   Brand Names: Parvin ACT Fluvoxamine; APO-Fluvoxamine; Luvox; ASHLEY-Fluvoxamine [DSC]; RATIO-Fluvoxamine [DSC]; NATALIO-Fluvox [DSC]; SANDOZ Fluvoxamine [DSC]   Warning   · Drugs like this one have raised the chance of suicidal thoughts or actions in children and young adults. The risk may be greater in people who have had these thoughts or actions in the past. All people who take this drug need to be watched closely. Call the doctor right away if signs like low mood (depression), nervousness, restlessness, grouchiness, panic attacks, or changes in mood or actions are new or worse. Call the doctor right away if any thoughts or actions of suicide occur.  · This drug is not approved for use in all children. Talk with the doctor to be sure that this drug is right for your child.    What is this drug used for?   · It is used to treat obsessive-compulsive problems.  · It may be given to you for other reasons. Talk with the doctor.    What do I need to tell my doctor BEFORE I take this drug?   · If you are allergic to this drug; any part of this drug; or any other drugs, foods, or substances. Tell your doctor about the allergy and what signs you had.  · If you have seizures.  · If you are taking any of these drugs: Linezolid or methylene blue.  · If you are taking any of these drugs: Alosetron, pimozide, ramelteon, thioridazine, or tizanidine.  · If you have taken certain drugs for depression or Parkinson's disease in the last 14 days. This includes isocarboxazid, phenelzine, tranylcypromine, selegiline, or rasagiline. Very high blood pressure may happen.  This is not a list of all drugs or health problems that interact with this drug.  Tell your doctor and pharmacist about all of your drugs (prescription or OTC, natural products, vitamins) and health problems. You must check to make sure that it is safe for you to take this  drug with all of your drugs and health problems. Do not start, stop, or change the dose of any drug without checking with your doctor.  What are some things I need to know or do while I take this drug?   · Tell all of your health care providers that you take this drug. This includes your doctors, nurses, pharmacists, and dentists.  · Avoid driving and doing other tasks or actions that call for you to be alert until you see how this drug affects you.  · Do not stop taking this drug all of a sudden without calling your doctor. You may have a greater risk of side effects. If you need to stop this drug, you will want to slowly stop it as ordered by your doctor.  · If you smoke, talk with your doctor.  · Avoid drinking alcohol while taking this drug.  · Talk with your doctor before you use marijuana, other forms of cannabis, or prescription or OTC drugs that may slow your actions.  · This drug may raise the chance of bleeding. Sometimes, bleeding can be life-threatening. Talk with the doctor.  · Some people may have a higher chance of eye problems with this drug. Your doctor may want you to have an eye exam to see if you have a higher chance of these eye problems. Call your doctor right away if you have eye pain, change in eyesight, or swelling or redness in or around the eye.  · It may take several weeks to see the full effects.  · This drug can cause low sodium levels. Very low sodium levels can be life-threatening, leading to seizures, passing out, trouble breathing, or death.  · If you are 65 or older, use this drug with care. You could have more side effects.  · This drug may affect growth in children and teens in some cases. They may need regular growth checks. Talk with the doctor.  · If the patient is a child, use this drug with care. The risk of some side effects may be higher in children.  · Tell your doctor if you are pregnant, plan on getting pregnant, or are breast-feeding. You will need to talk about the  benefits and risks to you and the baby.  · Taking this drug in the third trimester of pregnancy may lead to some health problems in the . Talk with the doctor.    What are some side effects that I need to call my doctor about right away?   WARNING/CAUTION: Even though it may be rare, some people may have very bad and sometimes deadly side effects when taking a drug. Tell your doctor or get medical help right away if you have any of the following signs or symptoms that may be related to a very bad side effect:  · Signs of an allergic reaction, like rash; hives; itching; red, swollen, blistered, or peeling skin with or without fever; wheezing; tightness in the chest or throat; trouble breathing, swallowing, or talking; unusual hoarseness; or swelling of the mouth, face, lips, tongue, or throat.  · Signs of low sodium levels like headache, trouble focusing, memory problems, feeling confused, weakness, seizures, or change in balance.  · Signs of bleeding like throwing up or coughing up blood; vomit that looks like coffee grounds; blood in the urine; black, red, or tarry stools; bleeding from the gums; abnormal vaginal bleeding; bruises without a cause or that get bigger; or bleeding you cannot stop.  · A big weight gain or loss.  · Passing urine more often.  · Painful erection (hard penis) or an erection that lasts for longer than 4 hours.  · Seizures.  · Period (menstrual) changes.  · Sex problems have happened with drugs like this one. This includes lowered interest in sex, trouble having an orgasm, ejaculation problems, or trouble getting or keeping an erection. If you have any sex problems or if you have any questions, talk with your doctor.  · A severe and sometimes deadly problem called serotonin syndrome may happen. The risk may be greater if you also take certain other drugs. Call your doctor right away if you have agitation; change in balance; confusion; hallucinations; fever; fast or abnormal heartbeat;  flushing; muscle twitching or stiffness; seizures; shivering or shaking; sweating a lot; severe diarrhea, upset stomach, or throwing up; or very bad headache.  What are some other side effects of this drug?   All drugs may cause side effects. However, many people have no side effects or only have minor side effects. Call your doctor or get medical help if any of these side effects or any other side effects bother you or do not go away:  · Feeling dizzy, sleepy, tired, or weak.  · Constipation, diarrhea, stomach pain, upset stomach, throwing up, or feeling less hungry.  · Dry mouth.  · Trouble sleeping.  · Headache.  · Gas.  · Change in taste.  · Feeling nervous and excitable.  · Sweating a lot.  · Shakiness.  · Signs of a common cold.  These are not all of the side effects that may occur. If you have questions about side effects, call your doctor. Call your doctor for medical advice about side effects.  You may report side effects to your national health agency.  You may report side effects to the FDA at 1-524.595.7036. You may also report side effects at https://www.fda.gov/medwatch.  How is this drug best taken?   Use this drug as ordered by your doctor. Read all information given to you. Follow all instructions closely.  All products:   · Take with or without food.  · Take at bedtime if you are taking once a day.  · Keep taking this drug as you have been told by your doctor or other health care provider, even if you feel well.  Extended-release capsules:   · Swallow whole. Do not chew, open, or crush.  What do I do if I miss a dose?   · Take a missed dose as soon as you think about it.  · If it is close to the time for your next dose, skip the missed dose and go back to your normal time.  · Do not take 2 doses at the same time or extra doses.    How do I store and/or throw out this drug?   · Store at room temperature in a dry place. Do not store in a bathroom.  · Keep all drugs in a safe place. Keep all drugs out  of the reach of children and pets.  · Throw away unused or  drugs. Do not flush down a toilet or pour down a drain unless you are told to do so. Check with your pharmacist if you have questions about the best way to throw out drugs. There may be drug take-back programs in your area.    General drug facts   · If your symptoms or health problems do not get better or if they become worse, call your doctor.  · Do not share your drugs with others and do not take anyone else's drugs.  · Some drugs may have another patient information leaflet. If you have any questions about this drug, please talk with your doctor, nurse, pharmacist, or other health care provider.  · This drug comes with an extra patient fact sheet called a Medication Guide. Read it with care. Read it again each time this drug is refilled. If you have any questions about this drug, please talk with the doctor, pharmacist, or other health care provider.  · If you think there has been an overdose, call your poison control center or get medical care right away. Be ready to tell or show what was taken, how much, and when it happened.    Consumer Information Use and Disclaimer   This generalized information is a limited summary of diagnosis, treatment, and/or medication information. It is not meant to be comprehensive and should be used as a tool to help the user understand and/or assess potential diagnostic and treatment options. It does NOT include all information about conditions, treatments, medications, side effects, or risks that may apply to a specific patient. It is not intended to be medical advice or a substitute for the medical advice, diagnosis, or treatment of a health care provider based on the health care provider's examination and assessment of a patient's specific and unique circumstances. Patients must speak with a health care provider for complete information about their health, medical questions, and treatment options, including any  risks or benefits regarding use of medications. This information does not endorse any treatments or medications as safe, effective, or approved for treating a specific patient. [employer] and its affiliates disclaim any warranty or liability relating to this information or the use thereof. The use of this information is governed by the Terms of Use, available at https://www.[employer]/en/solutions/lexicomp/about/zackary.  Last Reviewed Date   2021-07-22  Copyright   © 2021 [employer] and its affiliates and/or licensors. All rights reserved.

## 2022-02-03 NOTE — PROGRESS NOTES
Outpatient Psychiatry Follow-Up Visit (MD/NP)    2/3/2022    Clinical Status of Patient:  Outpatient (Ambulatory)    Chief Complaint:  Denny Atkinson is a 37 y.o. male who presents today for follow-up of depression, anxiety and behavior problems.  Met with patient.      Interval History and Content of Current Session:  Interim Events/Subjective Report/Content of Current Session:   Denny is seen today for medication follow up. Initial visit was 1/26/2022. He is being seen for a quick follow up for diagnotic evaluation and medication discussion. He overall states he is doing fine. Went to see his family in [place], [place] with his daughter for a birthday present. No other interval changes. He as diagnosed with OCD when he was in his teens. He used to have more compulsive behaviors such as not doing door knobs, hand washing, etc. These behaviors have mostly improved. He does do significant list making. Much more obsessional thought processes. We did complete OCD DSM-V criteria and he does meet criteria for OCD with good insight. Also meets criteria for MAHESH. Does not meet criteria for ADHD and states stimulants (caffeine and nicotine) help him focus but exacerbate OCD symptoms. 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 Also was able to speak with his wife who actually indicates more OCD sx that Denny maybe is not recognizing or downplaying such as fixation with his home office being in perfect order. He has not made much effort at improving sleep or reducing nicotine/caffeine usage. He will try to do this moving forward. He denies SI/HI. We discussed medications options in detail including luvox, anafranil, SGA. He will look into this information more and we will decide next week which medication he would like to move forward with.     GAD7 2/3/2022 1/27/2022   1. Feeling nervous, anxious, or on edge? 0 1   2. Not being able to stop or control worrying? 0 3   3. Worrying too much  about different things? 2 3   4. Trouble relaxing? 1 2   5. Being so restless that it is hard to sit still? 1 0   6. Becoming easily annoyed or irritable? 0 1   7. Feeling afraid as if something awful might happen? 0 0   8. If you checked off any problems, how difficult have these problems made it for you to do your work, take care of things at home, or get along with other people? 1 1   MAHESH-7 Score 4 10       PHQ9 2/3/2022   Little interest or pleasure in doing things: Not at all   Feeling down, depressed or hopeless: Not at all   Trouble falling asleep, staying asleep, or sleeping too much: Several days   Feeling tired or having little energy: Several days   Poor appetite or overeating: Not at all   Feeling bad about yourself- or that you are a failure or have let yourself or family down Not at all   Trouble concentrating on things, such as reading the newspaper or watching television: Several days   Moving or speaking so slowly that other people could have noticed. Or the opposite- being so fidgety or restless that you have been moving around a lot more than usual: Not at all   Thoughts that you would be better off dead or hurting yourself in some way: Not at all   If you indicated you have experienced any of the aforementioned problems, how difficult have these problems made it for you to do your work, take care of things at home or get along with other people? Not difficult at all   Total Score 3         Outpatient Psychiatry Initial Visit (MD/NP)     1/26/2022     Denny Atkinson, a 37 y.o. male, presenting for initial evaluation visit. Met with patient.     Reason for Encounter: Referral from Ben Dos Santos. Patient complains of OCD/anxiety.     History of Present Illness:   This is a 37-year-old male, past medical history of hyperlipidemia, who presents today for initial evaluation.  He reports that he is here due to significant amount of anxiety in particular obsessive-compulsive related symptoms.  He  states he is obsessively worrying.  He is a  and manages many graduates students.  He reports this is a very high stress/high pressure job.  His wife is a professor as well.  He has a 2-year-old girl, this is their first child. He has been on escitalopram for many years.  He states that is been working pretty well but he still does endorse a significant amount of anxiety.  Has trouble with sleep.  He does state that he drinks too much coffee and also uses Nicorette gum is a stimulant.  He states he used to play basketball 3-4 nights per week prior to COVID and this helped him sleep much more.  He has been exercising some but it is not enough to help him with his sleep.  He states this was a major stress release.  He has struggled for a while with tasks at his job.  He endorses ruminating thoughts.  Will have detailed notes that he has to reread and add to them.  It is impacting his functioning at this time.  When he was younger, he used to do repetitive handwashing but is not doing this now.  He does find that his symptoms are impacting his day-to-day life.  Medications that he has tried include bupropion, sertraline, fluoxetine, paroxetine.  He reports that he did have symptoms of serotonin toxicity during a cross taper of medications.  He states he is sensitive to medications.  Has not been on tricyclic antidepressants or Luvox.  Unsure about trazodone.  He previously participated in therapy for many years, interested in therapy referral today.  Discussed sleep hygiene in detail.  Denies suicidal or homicidal ideation.  No other complaints today.     Endorses prior problems with substance use disorders - does not want to be prescribed anything habit forming.     Depression symptoms:  Endorses difficulty with sleep, feeling tired or having little energy, overeating, trouble concentrating.  Denies suicidal ideation.  PHQ-9 seven, somewhat difficult     Anxiety symptoms:  GAD7 1/27/2022   1.  Feeling nervous, anxious, or on edge? 1   2. Not being able to stop or control worrying? 3   3. Worrying too much about different things? 3   4. Trouble relaxing? 2   5. Being so restless that it is hard to sit still? 0   6. Becoming easily annoyed or irritable? 1   7. Feeling afraid as if something awful might happen? 0   8. If you checked off any problems, how difficult have these problems made it for you to do your work, take care of things at home, or get along with other people? 1   MAHESH-7 Score 10            Fina/Hypomania symptoms: mac  MDQ Scale 1/27/2022   you felt so good or so hyper that other people thought you were not your normal self or you were so hyper that you got into trouble? 0   you were so irritable that you shouted at people or started fights or arguments? 0   you felt much more self-confident than usual? 0   you got much less sleep than usual and found that you didn't really miss it? 0   you were more talkative or spoke much faster than usual? 0   thoughts raced through your head or you couldn't slow your mind down? 1   you were so easily distracted by things around you that you had trouble concentrating or staying on track? 1   you had more energy than usual? 0   you were much more active or did many more things than usual? 0   you were much more social or outgoing than usual, for example, you telephoned friends in the middle of the night? 0   you were much more interested in sex than usual? 0   you did things that were unusual for you or that other people might have thought were excessive, foolish, or risky? 0   spending money got you or your family in trouble? 0   If you checked YES to more than one of the above, have several of these ever happened during the same period of time? 1   How much of a problem did any of these cause you - like being unable to work; having family, money or legal troubles; getting into arguments or fights? Moderate problem   Mood Disorder Questionnaire Score   2      Psychosis: denies     Attention/Concentration: fair     Body Image/Hx of eating disorders: denies, eating too much occasionally      Suicidal ideation and risk: denies suicidal thoughts, no access to guns, no hx of suicidal thoughts or attempts     Homicidal/Violient ideation and risk: denies     Sleep: poor sleep hygiene     Appetite: overeating     Past Psychiatric History:  Prior diagnoses: OCD, MAHESH, never been diagnosed with ADHD      Inpatient psychiatric treatment: denies     Outpatient psychiatric treatment: last saw psychiatrist, saw someone before Dr. Cast. Four or so years ago. Wasn't really thrilled. Was happy just on lexapro.      Prior medications: as described above     Current medications: lexapro 20mg daily     Prior suicide attempts: denies     Prior history self harm: denies     Prior psychotherapy: has participated in the past     Prior psychological testing: none     Allergies:       Review of patient's allergies indicates:   Allergen Reactions    Center-al house dust        Past Medical History:       Past Medical History:   Diagnosis Date    Allergy      Anxiety     Elevated cholesterol - not taking medicine      History TBI: denies  History seizures: denies     Past Surgical History:        Past Surgical History:   Procedure Laterality Date    FINGER SURGERY   2007    HERNIA REPAIR   1985      Family History:   Suicide: denies  Substance use: great grandfather   Bipolar disorder/Psychotic disorder: denies  Anxiety: brother, mom and dad  Depression: denies     Social History:  Childhood: born in [place]. Grew up in [place]. Went to college in [employer]. Raised by biological mother and father. Good relationship. They live in [place], [place]. One brother in [place] - younger brother. He's doing well. He has some anxiety, definitely not OCD.   Marital status: has been  for almost 10 years, supportive wife   Children: 2 year old daughter, August   Resides: in [place], [place]  Occupation: professor -  geology   Hobbies: basketball, enjoys walking/hiking with the family, riding bikes, likes to be outside  Voodoo: not religlious   Education level: PhD  : denies  Legal: denies  History of abuse/trauma: denies     Substance History:  Tobacco: nicorette gum - used to smoke cigarettes, quit smoking cigarettes probably 12 years ago. Gum - 3 or 4 pieces of day - at night.   Alcohol: no alcohol, when he was in high school would drink a lot. Has addictive personality.   Drug use: high school - opioids, not stimulants, used benzos, had to go treatment.   Caffeine: high caffeine - one of those yeti of coffee - make at home, community coffee      Rehab:  Prior/current AA: inpatient detox/rehab 20 years ago - several times between [age], before college         Psychotherapy:  · Target symptoms: distractability, lack of focus, anxiety , work stress  · Why chosen therapy is appropriate versus another modality: relevant to diagnosis  · Outcome monitoring methods: self-report, observation  · Therapeutic intervention type: supportive psychotherapy  · Topics discussed/themes: relationships difficulties, work stress, difficulty managing affect in interpersonal relationships, building skills sets for symptom management, symptom recognition, life stage transitional issues  · The patient's response to the intervention is accepting. The patient's progress toward treatment goals is good.   · Duration of intervention: 20 minutes.    Review of Systems   · PSYCHIATRIC: Pertinant items are noted in the narrative.  · RESPIRATORY: No shortness of breath.  · CARDIOVASCULAR: No tachycardia or chest pain.  · GASTROINTESTINAL: No nausea, vomiting, pain, constipation or diarrhea.    Past Medical, Family and Social History: The patient's past medical, family and social history have been reviewed and updated as appropriate within the electronic medical record - see encounter notes.    Compliance: yes    Side effects: None    Risk  "Parameters:  Patient reports no suicidal ideation  Patient reports no homicidal ideation  Patient reports no self-injurious behavior  Patient reports no violent behavior    Exam (detailed: at least 9 elements; comprehensive: all 15 elements)   Constitutional  Vitals:  Most recent vital signs, dated less than 90 days prior to this appointment, were reviewed.   Vitals:    02/03/22 1517   BP: 137/87   Pulse: 70   Weight: 109.5 kg (241 lb 8.2 oz)   Height: 6' 2" (1.88 m)        General:  unremarkable, age appropriate     Musculoskeletal  Muscle Strength/Tone:  no spasicity, no rigidity   Gait & Station:  non-ataxic     Psychiatric  Speech:  no latency; no press   Mood & Affect:  good  congruent and appropriate   Thought Process:  normal and logical   Associations:  intact   Thought Content:  normal, no suicidality, no homicidality, delusions, or paranoia   Insight:  intact   Judgement: behavior is adequate to circumstances   Orientation:  grossly intact   Memory: intact for content of interview   Language: grossly intact   Attention Span & Concentration:  able to focus   Fund of Knowledge:  intact and appropriate to age and level of education     Assessment and Diagnosis   Status/Progress: Based on the examination today, the patient's problem(s) is/are inadequately controlled.  New problems have not been presented today.   Co-morbidities, Diagnostic uncertainty and Lack of compliance are not complicating management of the primary condition.      General Impression:   OCD with good insight  MAHESH    Intervention/Counseling/Treatment Plan   · Medication Management: The risks and benefits of medication were discussed with the patient.  · Counseling provided with patient as follows: importance of compliance with chosen treatment options was emphasized, risks and benefits of treatment options, including medications, were discussed with the patient, risk factor reduction, prognosis    Denny is seen today for medication follow " up. Lexapro is not controlling symptoms to the degree that he would like. Discussed expectations for OCD sx and that generally goal is not elimination. He is understanding of this. Discussed options including luvox, anafranil, SGA. He would like to do more research before he decides. We will do a phone call check in next week to begin a medication and then his f/u visit will be to assess for responsiveness. Was able to get collateral information from his wife, Margret and she will keep us updated how he is doing as well.     Please go to emergency department if feeling as though you are a harm to yourself or others or if you are in crisis. Please call the clinic to report any worsening of symptoms or problems associated with medication.    Discussed with patient informed consent, risks vs. benefits, alternative treatments, side effect profile and the inherent unpredictability of individual responses to these treatments. The patient expresses understanding of the above and displays the capacity to agree with this current plan and had no other questions.      Return to Clinic: 1 month, as needed

## 2022-02-04 ENCOUNTER — TELEPHONE (OUTPATIENT)
Dept: PSYCHIATRY | Facility: CLINIC | Age: 37
End: 2022-02-04
Payer: COMMERCIAL

## 2022-02-04 NOTE — TELEPHONE ENCOUNTER
Called pts wife for collateral information after she had called clinic back. No answer, left VM to return call to clinic.

## 2022-03-09 ENCOUNTER — OFFICE VISIT (OUTPATIENT)
Dept: PSYCHIATRY | Facility: CLINIC | Age: 37
End: 2022-03-09
Payer: COMMERCIAL

## 2022-03-09 VITALS
HEIGHT: 74 IN | SYSTOLIC BLOOD PRESSURE: 119 MMHG | DIASTOLIC BLOOD PRESSURE: 75 MMHG | WEIGHT: 240.88 LBS | BODY MASS INDEX: 30.91 KG/M2 | HEART RATE: 85 BPM

## 2022-03-09 DIAGNOSIS — F41.1 GAD (GENERALIZED ANXIETY DISORDER): ICD-10-CM

## 2022-03-09 DIAGNOSIS — F42.9 OBSESSIVE-COMPULSIVE DISORDER, UNSPECIFIED TYPE: Primary | ICD-10-CM

## 2022-03-09 PROCEDURE — 1160F PR REVIEW ALL MEDS BY PRESCRIBER/CLIN PHARMACIST DOCUMENTED: ICD-10-PCS | Mod: CPTII,S$GLB,, | Performed by: PHYSICIAN ASSISTANT

## 2022-03-09 PROCEDURE — 3078F PR MOST RECENT DIASTOLIC BLOOD PRESSURE < 80 MM HG: ICD-10-PCS | Mod: CPTII,S$GLB,, | Performed by: PHYSICIAN ASSISTANT

## 2022-03-09 PROCEDURE — 3074F SYST BP LT 130 MM HG: CPT | Mod: CPTII,S$GLB,, | Performed by: PHYSICIAN ASSISTANT

## 2022-03-09 PROCEDURE — 1160F RVW MEDS BY RX/DR IN RCRD: CPT | Mod: CPTII,S$GLB,, | Performed by: PHYSICIAN ASSISTANT

## 2022-03-09 PROCEDURE — 99999 PR PBB SHADOW E&M-EST. PATIENT-LVL III: ICD-10-PCS | Mod: PBBFAC,,, | Performed by: PHYSICIAN ASSISTANT

## 2022-03-09 PROCEDURE — 3008F PR BODY MASS INDEX (BMI) DOCUMENTED: ICD-10-PCS | Mod: CPTII,S$GLB,, | Performed by: PHYSICIAN ASSISTANT

## 2022-03-09 PROCEDURE — 3078F DIAST BP <80 MM HG: CPT | Mod: CPTII,S$GLB,, | Performed by: PHYSICIAN ASSISTANT

## 2022-03-09 PROCEDURE — 1159F MED LIST DOCD IN RCRD: CPT | Mod: CPTII,S$GLB,, | Performed by: PHYSICIAN ASSISTANT

## 2022-03-09 PROCEDURE — 99999 PR PBB SHADOW E&M-EST. PATIENT-LVL III: CPT | Mod: PBBFAC,,, | Performed by: PHYSICIAN ASSISTANT

## 2022-03-09 PROCEDURE — 99214 PR OFFICE/OUTPT VISIT, EST, LEVL IV, 30-39 MIN: ICD-10-PCS | Mod: S$GLB,,, | Performed by: PHYSICIAN ASSISTANT

## 2022-03-09 PROCEDURE — 3008F BODY MASS INDEX DOCD: CPT | Mod: CPTII,S$GLB,, | Performed by: PHYSICIAN ASSISTANT

## 2022-03-09 PROCEDURE — 1159F PR MEDICATION LIST DOCUMENTED IN MEDICAL RECORD: ICD-10-PCS | Mod: CPTII,S$GLB,, | Performed by: PHYSICIAN ASSISTANT

## 2022-03-09 PROCEDURE — 99214 OFFICE O/P EST MOD 30 MIN: CPT | Mod: S$GLB,,, | Performed by: PHYSICIAN ASSISTANT

## 2022-03-09 PROCEDURE — 3074F PR MOST RECENT SYSTOLIC BLOOD PRESSURE < 130 MM HG: ICD-10-PCS | Mod: CPTII,S$GLB,, | Performed by: PHYSICIAN ASSISTANT

## 2022-03-09 RX ORDER — FLUVOXAMINE MALEATE 50 MG/1
TABLET ORAL
Qty: 60 TABLET | Refills: 1 | Status: SHIPPED | OUTPATIENT
Start: 2022-03-09 | End: 2022-04-04

## 2022-03-09 NOTE — PROGRESS NOTES
Outpatient Psychiatry Follow-Up Visit (MD/NP)    3/9/2022    Clinical Status of Patient:  Outpatient (Ambulatory)    Chief Complaint:  Denny Atkinson is a 37 y.o. male who presents today for follow-up of depression, anxiety and behavior problems.  Met with patient.      Interval History and Content of Current Session:  Interim Events/Subjective Report/Content of Current Session:   Denny  is seen today for medication follow-up.  He reports that he is pretty much doing the same.  Does have some upcoming work stressors.  He has done research on various medications and he is interested in trialing Luvox.  We discussed cross titration of the medications.  Patient does states he that he has had serotonin side societies symptoms in the past.  He denies suicidal or homicidal ideation.  No other complaints today.      FROM PREVIOUS HPI  Denny is seen today for medication follow up. Initial visit was 1/26/2022. He is being seen for a quick follow up for diagnotic evaluation and medication discussion. He overall states he is doing fine. Went to see his family in [place], [place] with his daughter for a birthday present. No other interval changes. He as diagnosed with OCD when he was in his teens. He used to have more compulsive behaviors such as not doing door knobs, hand washing, etc. These behaviors have mostly improved. He does do significant list making. Much more obsessional thought processes. We did complete OCD DSM-V criteria and he does meet criteria for OCD with good insight. Also meets criteria for MAHESH. Does not meet criteria for ADHD and states stimulants (caffeine and nicotine) help him focus but exacerbate OCD symptoms. 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 Also was able to speak with his wife who actually indicates more OCD sx that Denny maybe is not recognizing or downplaying such as fixation with his home office being in perfect order. He has not made much effort at  improving sleep or reducing nicotine/caffeine usage. He will try to do this moving forward. He denies SI/HI. We discussed medications options in detail including luvox, anafranil, SGA. He will look into this information more and we will decide next week which medication he would like to move forward with.     GAD7 3/9/2022 2/3/2022 1/27/2022   1. Feeling nervous, anxious, or on edge? 2 0 1   2. Not being able to stop or control worrying? 1 0 3   3. Worrying too much about different things? 1 2 3   4. Trouble relaxing? 1 1 2   5. Being so restless that it is hard to sit still? 1 1 0   6. Becoming easily annoyed or irritable? 1 0 1   7. Feeling afraid as if something awful might happen? 1 0 0   8. If you checked off any problems, how difficult have these problems made it for you to do your work, take care of things at home, or get along with other people? 1 1 1   MAHESH-7 Score 8 4 10       PHQ9 3/9/2022   Little interest or pleasure in doing things: Not at all   Feeling down, depressed or hopeless: Not at all   Trouble falling asleep, staying asleep, or sleeping too much: Several days   Feeling tired or having little energy: Several days   Poor appetite or overeating: Not at all   Feeling bad about yourself- or that you are a failure or have let yourself or family down Not at all   Trouble concentrating on things, such as reading the newspaper or watching television: Several days   Moving or speaking so slowly that other people could have noticed. Or the opposite- being so fidgety or restless that you have been moving around a lot more than usual: Several days   Thoughts that you would be better off dead or hurting yourself in some way: Not at all   If you indicated you have experienced any of the aforementioned problems, how difficult have these problems made it for you to do your work, take care of things at home or get along with other people? Not difficult at all   Total Score 4         Outpatient Psychiatry Initial  Visit (MD/NP)     1/26/2022     Denny Atkinson, a 37 y.o. male, presenting for initial evaluation visit. Met with patient.     Reason for Encounter: Referral from Ben Dos Santos. Patient complains of OCD/anxiety.     History of Present Illness:   This is a 37-year-old male, past medical history of hyperlipidemia, who presents today for initial evaluation.  He reports that he is here due to significant amount of anxiety in particular obsessive-compulsive related symptoms.  He states he is obsessively worrying.  He is a  and manages many graduates students.  He reports this is a very high stress/high pressure job.  His wife is a professor as well.  He has a 2-year-old girl, this is their first child. He has been on escitalopram for many years.  He states that is been working pretty well but he still does endorse a significant amount of anxiety.  Has trouble with sleep.  He does state that he drinks too much coffee and also uses Nicorette gum is a stimulant.  He states he used to play basketball 3-4 nights per week prior to COVID and this helped him sleep much more.  He has been exercising some but it is not enough to help him with his sleep.  He states this was a major stress release.  He has struggled for a while with tasks at his job.  He endorses ruminating thoughts.  Will have detailed notes that he has to reread and add to them.  It is impacting his functioning at this time.  When he was younger, he used to do repetitive handwashing but is not doing this now.  He does find that his symptoms are impacting his day-to-day life.  Medications that he has tried include bupropion, sertraline, fluoxetine, paroxetine.  He reports that he did have symptoms of serotonin toxicity during a cross taper of medications.  He states he is sensitive to medications.  Has not been on tricyclic antidepressants or Luvox.  Unsure about trazodone.  He previously participated in therapy for many years, interested in  therapy referral today.  Discussed sleep hygiene in detail.  Denies suicidal or homicidal ideation.  No other complaints today.     Endorses prior problems with substance use disorders - does not want to be prescribed anything habit forming.     Depression symptoms:  Endorses difficulty with sleep, feeling tired or having little energy, overeating, trouble concentrating.  Denies suicidal ideation.  PHQ-9 seven, somewhat difficult     Anxiety symptoms:  GAD7 1/27/2022   1. Feeling nervous, anxious, or on edge? 1   2. Not being able to stop or control worrying? 3   3. Worrying too much about different things? 3   4. Trouble relaxing? 2   5. Being so restless that it is hard to sit still? 0   6. Becoming easily annoyed or irritable? 1   7. Feeling afraid as if something awful might happen? 0   8. If you checked off any problems, how difficult have these problems made it for you to do your work, take care of things at home, or get along with other people? 1   MAHESH-7 Score 10            Fina/Hypomania symptoms: denies  MDQ Scale 1/27/2022   you felt so good or so hyper that other people thought you were not your normal self or you were so hyper that you got into trouble? 0   you were so irritable that you shouted at people or started fights or arguments? 0   you felt much more self-confident than usual? 0   you got much less sleep than usual and found that you didn't really miss it? 0   you were more talkative or spoke much faster than usual? 0   thoughts raced through your head or you couldn't slow your mind down? 1   you were so easily distracted by things around you that you had trouble concentrating or staying on track? 1   you had more energy than usual? 0   you were much more active or did many more things than usual? 0   you were much more social or outgoing than usual, for example, you telephoned friends in the middle of the night? 0   you were much more interested in sex than usual? 0   you did things that were  unusual for you or that other people might have thought were excessive, foolish, or risky? 0   spending money got you or your family in trouble? 0   If you checked YES to more than one of the above, have several of these ever happened during the same period of time? 1   How much of a problem did any of these cause you - like being unable to work; having family, money or legal troubles; getting into arguments or fights? Moderate problem   Mood Disorder Questionnaire Score  2      Psychosis: denies     Attention/Concentration: fair     Body Image/Hx of eating disorders: denies, eating too much occasionally      Suicidal ideation and risk: denies suicidal thoughts, no access to guns, no hx of suicidal thoughts or attempts     Homicidal/Violient ideation and risk: denies     Sleep: poor sleep hygiene     Appetite: overeating     Past Psychiatric History:  Prior diagnoses: OCD, MAHESH, never been diagnosed with ADHD      Inpatient psychiatric treatment: denies     Outpatient psychiatric treatment: last saw psychiatrist, saw someone before Dr. Cast. Four or so years ago. Wasn't really thrilled. Was happy just on lexapro.      Prior medications: as described above     Current medications: lexapro 20mg daily     Prior suicide attempts: denies     Prior history self harm: denies     Prior psychotherapy: has participated in the past     Prior psychological testing: none     Allergies:       Review of patient's allergies indicates:   Allergen Reactions    Center-al house dust        Past Medical History:       Past Medical History:   Diagnosis Date    Allergy      Anxiety     Elevated cholesterol - not taking medicine      History TBI: denies  History seizures: denies     Past Surgical History:        Past Surgical History:   Procedure Laterality Date    FINGER SURGERY   2007    HERNIA REPAIR   1985      Family History:   Suicide: denies  Substance use: great grandfather   Bipolar disorder/Psychotic disorder:  denies  Anxiety: brother, mom and dad  Depression: denies     Social History:  Childhood: born in [place]. Grew up in [place]. Went to college in [employer]. Raised by biological mother and father. Good relationship. They live in [place], [place]. One brother in [place] - younger brother. He's doing well. He has some anxiety, definitely not OCD.   Marital status: has been  for almost 10 years, supportive wife   Children: 2 year old daughter, August   Resides: in [place], [place]  Occupation: professor - [employer]   Hobbies: basketball, enjoys walking/hiking with the family, riding bikes, likes to be outside  Hindu: not religlious   Education level: PhD  : denies  Legal: denies  History of abuse/trauma: denies     Substance History:  Tobacco: nicorette gum - used to smoke cigarettes, quit smoking cigarettes probably 12 years ago. Gum - 3 or 4 pieces of day - at night.   Alcohol: no alcohol, when he was in high school would drink a lot. Has addictive personality.   Drug use: high school - opioids, not stimulants, used benzos, had to go treatment.   Caffeine: high caffeine - one of those yeti of coffee - make at home, community coffee      Rehab:  Prior/current AA: inpatient detox/rehab 20 years ago - several times between [age], before college         Psychotherapy:  · Target symptoms: distractability, lack of focus, anxiety , work stress  · Why chosen therapy is appropriate versus another modality: relevant to diagnosis  · Outcome monitoring methods: self-report, observation  · Therapeutic intervention type: supportive psychotherapy  · Topics discussed/themes: relationships difficulties, work stress, difficulty managing affect in interpersonal relationships, building skills sets for symptom management, symptom recognition, life stage transitional issues  · The patient's response to the intervention is accepting. The patient's progress toward treatment goals is good.   · Duration of intervention: 20 minutes.    Review of Systems    · PSYCHIATRIC: Pertinant items are noted in the narrative.  · RESPIRATORY: No shortness of breath.  · CARDIOVASCULAR: No tachycardia or chest pain.  · GASTROINTESTINAL: No nausea, vomiting, pain, constipation or diarrhea.    Past Medical, Family and Social History: The patient's past medical, family and social history have been reviewed and updated as appropriate within the electronic medical record - see encounter notes.    Compliance: yes    Side effects: None    Risk Parameters:  Patient reports no suicidal ideation  Patient reports no homicidal ideation  Patient reports no self-injurious behavior  Patient reports no violent behavior    Exam (detailed: at least 9 elements; comprehensive: all 15 elements)   Constitutional  Vitals:  Most recent vital signs, dated less than 90 days prior to this appointment, were reviewed.   Vitals:    03/09/22 1615   BP: 119/75   Pulse: 85        General:  unremarkable, age appropriate     Musculoskeletal  Muscle Strength/Tone:  no spasicity, no rigidity   Gait & Station:  non-ataxic     Psychiatric  Speech:  no latency; no press   Mood & Affect:  good  congruent and appropriate   Thought Process:  normal and logical   Associations:  intact   Thought Content:  normal, no suicidality, no homicidality, delusions, or paranoia   Insight:  intact   Judgement: behavior is adequate to circumstances   Orientation:  grossly intact   Memory: intact for content of interview   Language: grossly intact   Attention Span & Concentration:  able to focus   Fund of Knowledge:  intact and appropriate to age and level of education     Assessment and Diagnosis   Status/Progress: Based on the examination today, the patient's problem(s) is/are inadequately controlled.  New problems have not been presented today.   Co-morbidities, Diagnostic uncertainty and Lack of compliance are not complicating management of the primary condition.      General Impression:   OCD with good  insight  MAHESH    Intervention/Counseling/Treatment Plan   · Medication Management: The risks and benefits of medication were discussed with the patient.  · Counseling provided with patient as follows: importance of compliance with chosen treatment options was emphasized, risks and benefits of treatment options, including medications, were discussed with the patient, risk factor reduction, prognosis    Denny is seen today for medication follow up. Lexapro is not controlling symptoms to the degree that he would like. Discussed expectations for OCD sx and that generally goal is not elimination. He is understanding of this. Discussed options including luvox, anafranil, SGA. He would like to do more research before he decides. We will do a phone call check in next week to begin a medication and then his f/u visit will be to assess for responsiveness. Was able to get collateral information from his wife, Margret and she will keep us updated how he is doing as well.     Will reduce lexapro to 10mg once daily for 7 days then stop.   Begin Luvox 50mg daily for OCD.  Consider NAC.    Please go to emergency department if feeling as though you are a harm to yourself or others or if you are in crisis. Please call the clinic to report any worsening of symptoms or problems associated with medication.    Discussed with patient informed consent, risks vs. benefits, alternative treatments, side effect profile and the inherent unpredictability of individual responses to these treatments. The patient expresses understanding of the above and displays the capacity to agree with this current plan and had no other questions.      Return to Clinic: 1 month, as needed

## 2022-03-09 NOTE — PATIENT INSTRUCTIONS
Reduce lexapro to 10mg (half tablet) for 7 days then discontinue.    In 7 days begin, Luvox.    Please go to emergency department if feeling as though you are a harm to yourself or others or if you are in crisis. Please call the clinic to report any worsening of symptoms or problems associated with medication.

## 2022-04-03 DIAGNOSIS — F42.9 OBSESSIVE-COMPULSIVE DISORDER, UNSPECIFIED TYPE: ICD-10-CM

## 2022-04-04 RX ORDER — FLUVOXAMINE MALEATE 50 MG/1
TABLET ORAL
Qty: 60 TABLET | Refills: 1 | Status: SHIPPED | OUTPATIENT
Start: 2022-04-04 | End: 2022-04-12 | Stop reason: SDUPTHER

## 2022-04-12 ENCOUNTER — OFFICE VISIT (OUTPATIENT)
Dept: PSYCHIATRY | Facility: CLINIC | Age: 37
End: 2022-04-12
Payer: COMMERCIAL

## 2022-04-12 DIAGNOSIS — F41.1 GAD (GENERALIZED ANXIETY DISORDER): ICD-10-CM

## 2022-04-12 DIAGNOSIS — F42.9 OBSESSIVE-COMPULSIVE DISORDER, UNSPECIFIED TYPE: Primary | ICD-10-CM

## 2022-04-12 PROCEDURE — 1160F PR REVIEW ALL MEDS BY PRESCRIBER/CLIN PHARMACIST DOCUMENTED: ICD-10-PCS | Mod: CPTII,95,, | Performed by: PHYSICIAN ASSISTANT

## 2022-04-12 PROCEDURE — 1160F RVW MEDS BY RX/DR IN RCRD: CPT | Mod: CPTII,95,, | Performed by: PHYSICIAN ASSISTANT

## 2022-04-12 PROCEDURE — 1159F PR MEDICATION LIST DOCUMENTED IN MEDICAL RECORD: ICD-10-PCS | Mod: CPTII,95,, | Performed by: PHYSICIAN ASSISTANT

## 2022-04-12 PROCEDURE — 99214 OFFICE O/P EST MOD 30 MIN: CPT | Mod: 95,,, | Performed by: PHYSICIAN ASSISTANT

## 2022-04-12 PROCEDURE — 99214 PR OFFICE/OUTPT VISIT, EST, LEVL IV, 30-39 MIN: ICD-10-PCS | Mod: 95,,, | Performed by: PHYSICIAN ASSISTANT

## 2022-04-12 PROCEDURE — 1159F MED LIST DOCD IN RCRD: CPT | Mod: CPTII,95,, | Performed by: PHYSICIAN ASSISTANT

## 2022-04-12 RX ORDER — FLUVOXAMINE MALEATE 50 MG/1
TABLET ORAL
Qty: 90 TABLET | Refills: 1 | Status: SHIPPED | OUTPATIENT
Start: 2022-04-12 | End: 2022-05-09 | Stop reason: SDUPTHER

## 2022-04-12 NOTE — PATIENT INSTRUCTIONS
Please go to emergency department if feeling as though you are a harm to yourself or others or if you are in crisis. Please call the clinic to report any worsening of symptoms or problems associated with medication.

## 2022-04-12 NOTE — PROGRESS NOTES
The patient location is:   The chief complaint leading to consultation is:     Visit type: audiovisual    Face to Face time with patient: 30  46 minutes of total time spent on the encounter, which includes face to face time and non-face to face time preparing to see the patient (eg, review of tests), Obtaining and/or reviewing separately obtained history, Documenting clinical information in the electronic or other health record, Independently interpreting results (not separately reported) and communicating results to the patient/family/caregiver, or Care coordination (not separately reported).     Each patient to whom he or she provides medical services by telemedicine is:  (1) informed of the relationship between the physician and patient and the respective role of any other health care provider with respect to management of the patient; and (2) notified that he or she may decline to receive medical services by telemedicine and may withdraw from such care at any time.    Outpatient Psychiatry Follow-Up Visit (MD/NP)    4/12/2022    Clinical Status of Patient:  Outpatient (Ambulatory)    Chief Complaint:  Denny Atkinson is a 37 y.o. male who presents today for follow-up of depression, anxiety and behavior problems.  Met with patient.      Interval History and Content of Current Session:  Interim Events/Subjective Report/Content of Current Session:   Denny is seen today for medication follow-up.  Patient is seen virtually.  He reports that he is doing well.  Doing well on Luvox.  He did have a difficult time with transition from Lexapro to Luvox but has now stabilized.  He was very irritable during this time.  He does report that he cannot drink the same amount of caffeine so he has reduced caffeine intake dramatically.  He felt very jittery when he was taking the Luvox and high amount of caffeine.  He does feel like he has achieved a better balance between this.  He has a little less obsessive thoughts although  they are still present.  Work is going well, states that stress, anxiety, OCD all interrelate with each other.  He feels that he is able to sleep with Luvox but that it does give him energy in the morning.  He has been playing basketball again because the COVID numbers have been low so this is been a nice release.  He denies suicidal or homicidal ideation.  No other complaints today.    FROM PREVIOUS HPI  Denny  is seen today for medication follow-up.  He reports that he is pretty much doing the same.  Does have some upcoming work stressors.  He has done research on various medications and he is interested in trialing Luvox.  We discussed cross titration of the medications.  Patient does states he that he has had serotonin side societies symptoms in the past.  He denies suicidal or homicidal ideation.  No other complaints today.        GAD7 4/12/2022 3/9/2022 2/3/2022   1. Feeling nervous, anxious, or on edge? 1 2 0   2. Not being able to stop or control worrying? 1 1 0   3. Worrying too much about different things? 1 1 2   4. Trouble relaxing? 1 1 1   5. Being so restless that it is hard to sit still? 0 1 1   6. Becoming easily annoyed or irritable? 1 1 0   7. Feeling afraid as if something awful might happen? 0 1 0   8. If you checked off any problems, how difficult have these problems made it for you to do your work, take care of things at home, or get along with other people? 0 1 1   MAHESH-7 Score 5 8 4       PHQ9 3/9/2022   Little interest or pleasure in doing things: Not at all   Feeling down, depressed or hopeless: Not at all   Trouble falling asleep, staying asleep, or sleeping too much: Several days   Feeling tired or having little energy: Several days   Poor appetite or overeating: Not at all   Feeling bad about yourself- or that you are a failure or have let yourself or family down Not at all   Trouble concentrating on things, such as reading the newspaper or watching television: Several days   Moving or  speaking so slowly that other people could have noticed. Or the opposite- being so fidgety or restless that you have been moving around a lot more than usual: Several days   Thoughts that you would be better off dead or hurting yourself in some way: Not at all   If you indicated you have experienced any of the aforementioned problems, how difficult have these problems made it for you to do your work, take care of things at home or get along with other people? Not difficult at all   Total Score 4         Outpatient Psychiatry Initial Visit (MD/NP)     1/26/2022     Denny Atkinson, a 37 y.o. male, presenting for initial evaluation visit. Met with patient.     Reason for Encounter: Referral from Ben Dos Santos. Patient complains of OCD/anxiety.     History of Present Illness:   This is a 37-year-old male, past medical history of hyperlipidemia, who presents today for initial evaluation.  He reports that he is here due to significant amount of anxiety in particular obsessive-compulsive related symptoms.  He states he is obsessively worrying.  He is a  and manages many graduates students.  He reports this is a very high stress/high pressure job.  His wife is a professor as well.  He has a 2-year-old girl, this is their first child. He has been on escitalopram for many years.  He states that is been working pretty well but he still does endorse a significant amount of anxiety.  Has trouble with sleep.  He does state that he drinks too much coffee and also uses Nicorette gum is a stimulant.  He states he used to play basketball 3-4 nights per week prior to COVID and this helped him sleep much more.  He has been exercising some but it is not enough to help him with his sleep.  He states this was a major stress release.  He has struggled for a while with tasks at his job.  He endorses ruminating thoughts.  Will have detailed notes that he has to reread and add to them.  It is impacting his functioning at  this time.  When he was younger, he used to do repetitive handwashing but is not doing this now.  He does find that his symptoms are impacting his day-to-day life.  Medications that he has tried include bupropion, sertraline, fluoxetine, paroxetine.  He reports that he did have symptoms of serotonin toxicity during a cross taper of medications.  He states he is sensitive to medications.  Has not been on tricyclic antidepressants or Luvox.  Unsure about trazodone.  He previously participated in therapy for many years, interested in therapy referral today.  Discussed sleep hygiene in detail.  Denies suicidal or homicidal ideation.  No other complaints today.     Endorses prior problems with substance use disorders - does not want to be prescribed anything habit forming.     Depression symptoms:  Endorses difficulty with sleep, feeling tired or having little energy, overeating, trouble concentrating.  Denies suicidal ideation.  PHQ-9 seven, somewhat difficult     Anxiety symptoms:  GAD7 1/27/2022   1. Feeling nervous, anxious, or on edge? 1   2. Not being able to stop or control worrying? 3   3. Worrying too much about different things? 3   4. Trouble relaxing? 2   5. Being so restless that it is hard to sit still? 0   6. Becoming easily annoyed or irritable? 1   7. Feeling afraid as if something awful might happen? 0   8. If you checked off any problems, how difficult have these problems made it for you to do your work, take care of things at home, or get along with other people? 1   MAHESH-7 Score 10            Fina/Hypomania symptoms: denies  MDQ Scale 1/27/2022   you felt so good or so hyper that other people thought you were not your normal self or you were so hyper that you got into trouble? 0   you were so irritable that you shouted at people or started fights or arguments? 0   you felt much more self-confident than usual? 0   you got much less sleep than usual and found that you didn't really miss it? 0   you  were more talkative or spoke much faster than usual? 0   thoughts raced through your head or you couldn't slow your mind down? 1   you were so easily distracted by things around you that you had trouble concentrating or staying on track? 1   you had more energy than usual? 0   you were much more active or did many more things than usual? 0   you were much more social or outgoing than usual, for example, you telephoned friends in the middle of the night? 0   you were much more interested in sex than usual? 0   you did things that were unusual for you or that other people might have thought were excessive, foolish, or risky? 0   spending money got you or your family in trouble? 0   If you checked YES to more than one of the above, have several of these ever happened during the same period of time? 1   How much of a problem did any of these cause you - like being unable to work; having family, money or legal troubles; getting into arguments or fights? Moderate problem   Mood Disorder Questionnaire Score  2      Psychosis: denies     Attention/Concentration: fair     Body Image/Hx of eating disorders: denies, eating too much occasionally      Suicidal ideation and risk: denies suicidal thoughts, no access to guns, no hx of suicidal thoughts or attempts     Homicidal/Violient ideation and risk: denies     Sleep: poor sleep hygiene     Appetite: overeating     Past Psychiatric History:  Prior diagnoses: OCD, MAHESH, never been diagnosed with ADHD      Inpatient psychiatric treatment: denies     Outpatient psychiatric treatment: last saw psychiatrist, saw someone before Dr. Cast. Four or so years ago. Wasn't really thrilled. Was happy just on lexapro.      Prior medications: as described above     Current medications: lexapro 20mg daily     Prior suicide attempts: denies     Prior history self harm: denies     Prior psychotherapy: has participated in the past     Prior psychological testing: none     Allergies:       Review  of patient's allergies indicates:   Allergen Reactions    Center-al house dust        Past Medical History:       Past Medical History:   Diagnosis Date    Allergy      Anxiety     Elevated cholesterol - not taking medicine      History TBI: denies  History seizures: denies     Past Surgical History:        Past Surgical History:   Procedure Laterality Date    FINGER SURGERY   2007    HERNIA REPAIR   1985      Family History:   Suicide: denies  Substance use: great grandfather   Bipolar disorder/Psychotic disorder: denies  Anxiety: brother, mom and dad  Depression: denies     Social History:  Childhood: born in [place]. Grew up in [place]. Went to college in [employer]. Raised by biological mother and father. Good relationship. They live in [place], [place]. One brother in [place] - younger brother. He's doing well. He has some anxiety, definitely not OCD.   Marital status: has been  for almost 10 years, supportive wife   Children: 2 year old daughter, August   Resides: in [place], [place]  Occupation: professor - [employer]   Hobbies: basketball, enjoys walking/hiking with the family, riding bikes, likes to be outside  Jainism: not religlious   Education level: PhD  : denies  Legal: denies  History of abuse/trauma: denies     Substance History:  Tobacco: nicorette gum - used to smoke cigarettes, quit smoking cigarettes probably 12 years ago. Gum - 3 or 4 pieces of day - at night.   Alcohol: no alcohol, when he was in high school would drink a lot. Has addictive personality.   Drug use: high school - opioids, not stimulants, used benzos, had to go treatment.   Caffeine: high caffeine - one of those yeti of coffee - make at home, community coffee      Rehab:  Prior/current AA: inpatient detox/rehab 20 years ago - several times between [age], before college         Psychotherapy:  · Target symptoms: distractability, lack of focus, anxiety , work stress  · Why chosen therapy is appropriate versus another modality: relevant to  diagnosis  · Outcome monitoring methods: self-report, observation  · Therapeutic intervention type: supportive psychotherapy  · Topics discussed/themes: relationships difficulties, work stress, difficulty managing affect in interpersonal relationships, building skills sets for symptom management, symptom recognition, life stage transitional issues  · The patient's response to the intervention is accepting. The patient's progress toward treatment goals is good.   · Duration of intervention: 20 minutes.    Review of Systems   · PSYCHIATRIC: Pertinant items are noted in the narrative.  · RESPIRATORY: No shortness of breath.  · CARDIOVASCULAR: No tachycardia or chest pain.  · GASTROINTESTINAL: No nausea, vomiting, pain, constipation or diarrhea.    Past Medical, Family and Social History: The patient's past medical, family and social history have been reviewed and updated as appropriate within the electronic medical record - see encounter notes.    Compliance: yes    Side effects: None    Risk Parameters:  Patient reports no suicidal ideation  Patient reports no homicidal ideation  Patient reports no self-injurious behavior  Patient reports no violent behavior    Exam (detailed: at least 9 elements; comprehensive: all 15 elements)   Constitutional  Vitals:  Most recent vital signs, dated less than 90 days prior to this appointment, were reviewed.   There were no vitals filed for this visit.     General:  unremarkable, age appropriate     Musculoskeletal  Muscle Strength/Tone:  no spasicity, no rigidity   Gait & Station:  non-ataxic     Psychiatric  Speech:  no latency; no press   Mood & Affect:  good  congruent and appropriate   Thought Process:  normal and logical   Associations:  intact   Thought Content:  normal, no suicidality, no homicidality, delusions, or paranoia   Insight:  intact   Judgement: behavior is adequate to circumstances   Orientation:  grossly intact   Memory: intact for content of interview    Language: grossly intact   Attention Span & Concentration:  able to focus   Fund of Knowledge:  intact and appropriate to age and level of education     Assessment and Diagnosis   Status/Progress: Based on the examination today, the patient's problem(s) is/are inadequately controlled.  New problems have not been presented today.   Co-morbidities, Diagnostic uncertainty and Lack of compliance are not complicating management of the primary condition.      General Impression:   OCD with good insight  MAHESH    Intervention/Counseling/Treatment Plan   · Medication Management: The risks and benefits of medication were discussed with the patient.  · Counseling provided with patient as follows: importance of compliance with chosen treatment options was emphasized, risks and benefits of treatment options, including medications, were discussed with the patient, risk factor reduction, prognosis    Denny is seen today for medication follow up. Doing well on Luvox.     Increase Luvox to 150mg nightly for OCD.  Consider NAC.    Please go to emergency department if feeling as though you are a harm to yourself or others or if you are in crisis. Please call the clinic to report any worsening of symptoms or problems associated with medication.    Discussed with patient informed consent, risks vs. benefits, alternative treatments, side effect profile and the inherent unpredictability of individual responses to these treatments. The patient expresses understanding of the above and displays the capacity to agree with this current plan and had no other questions.      Return to Clinic: 1 month, as needed

## 2022-05-07 ENCOUNTER — PATIENT MESSAGE (OUTPATIENT)
Dept: PSYCHIATRY | Facility: CLINIC | Age: 37
End: 2022-05-07
Payer: COMMERCIAL

## 2022-05-07 DIAGNOSIS — F42.9 OBSESSIVE-COMPULSIVE DISORDER, UNSPECIFIED TYPE: ICD-10-CM

## 2022-05-07 RX ORDER — FLUVOXAMINE MALEATE 50 MG/1
TABLET ORAL
Qty: 90 TABLET | Refills: 1 | Status: CANCELLED | OUTPATIENT
Start: 2022-05-07

## 2022-05-09 RX ORDER — FLUVOXAMINE MALEATE 50 MG/1
TABLET ORAL
Qty: 90 TABLET | Refills: 1 | Status: SHIPPED | OUTPATIENT
Start: 2022-05-09 | End: 2022-05-17 | Stop reason: SDUPTHER

## 2022-05-09 NOTE — TELEPHONE ENCOUNTER
Pt is requesting medication refill on Fluvoxamine 50 mg   Last refill: 4/12/22 ( mail order)  Last visit: 4/12/22  Follow Up: 5/17/22

## 2022-05-17 ENCOUNTER — OFFICE VISIT (OUTPATIENT)
Dept: PSYCHIATRY | Facility: CLINIC | Age: 37
End: 2022-05-17
Payer: COMMERCIAL

## 2022-05-17 DIAGNOSIS — F42.9 OBSESSIVE-COMPULSIVE DISORDER, UNSPECIFIED TYPE: ICD-10-CM

## 2022-05-17 DIAGNOSIS — F41.1 GAD (GENERALIZED ANXIETY DISORDER): Primary | ICD-10-CM

## 2022-05-17 PROCEDURE — 1160F PR REVIEW ALL MEDS BY PRESCRIBER/CLIN PHARMACIST DOCUMENTED: ICD-10-PCS | Mod: CPTII,95,, | Performed by: PHYSICIAN ASSISTANT

## 2022-05-17 PROCEDURE — 1159F MED LIST DOCD IN RCRD: CPT | Mod: CPTII,95,, | Performed by: PHYSICIAN ASSISTANT

## 2022-05-17 PROCEDURE — 1159F PR MEDICATION LIST DOCUMENTED IN MEDICAL RECORD: ICD-10-PCS | Mod: CPTII,95,, | Performed by: PHYSICIAN ASSISTANT

## 2022-05-17 PROCEDURE — 99214 OFFICE O/P EST MOD 30 MIN: CPT | Mod: 95,,, | Performed by: PHYSICIAN ASSISTANT

## 2022-05-17 PROCEDURE — 1160F RVW MEDS BY RX/DR IN RCRD: CPT | Mod: CPTII,95,, | Performed by: PHYSICIAN ASSISTANT

## 2022-05-17 PROCEDURE — 99214 PR OFFICE/OUTPT VISIT, EST, LEVL IV, 30-39 MIN: ICD-10-PCS | Mod: 95,,, | Performed by: PHYSICIAN ASSISTANT

## 2022-05-17 RX ORDER — FLUVOXAMINE MALEATE 100 MG/1
TABLET, COATED ORAL
Qty: 60 TABLET | Refills: 1 | Status: SHIPPED | OUTPATIENT
Start: 2022-05-17 | End: 2022-06-01

## 2022-05-17 NOTE — PROGRESS NOTES
Established Patient - Audio Only Telehealth Visit     The patient location is: his car in [place], [place]  The chief complaint leading to consultation is: OCD  Visit type: Virtual visit with audio only (telephone)  Total time spent with patient: 15       The reason for the audio only service rather than synchronous audio and video virtual visit was related to technical difficulties or patient preference/necessity.     Each patient to whom I provide medical services by telemedicine is:  (1) informed of the relationship between the physician and patient and the respective role of any other health care provider with respect to management of the patient; and (2) notified that they may decline to receive medical services by telemedicine and may withdraw from such care at any time. Patient verbally consented to receive this service via voice-only telephone call.       HPI:   Denny is seen today for medication follow-up.  Patient is seen for audio only visit due to Internet issues.  He states he is doing well with Luvox, tolerating well.  He is interested in increasing it to 200 mg. His wife has been out of town so he has been taking care of his daughter on his own which she is doing well with.  He is still interested in therapy.  Does not have other ongoing concerns at this time.  He denies suicidal or homicidal ideation.  No other complaints today.     Assessment and plan:       General Impression:   OCD with good insight  MAHESH     Intervention/Counseling/Treatment Plan   · Medication Management: The risks and benefits of medication were discussed with the patient.  · Counseling provided with patient as follows: importance of compliance with chosen treatment options was emphasized, risks and benefits of treatment options, including medications, were discussed with the patient, risk factor reduction, prognosis     Denny is seen today for medication follow up. Doing well on Luvox.      Increase Luvox to 200mg nightly for  OCD.  Consider NAC.     Please go to emergency department if feeling as though you are a harm to yourself or others or if you are in crisis. Please call the clinic to report any worsening of symptoms or problems associated with medication.     Discussed with patient informed consent, risks vs. benefits, alternative treatments, side effect profile and the inherent unpredictability of individual responses to these treatments. The patient expresses understanding of the above and displays the capacity to agree with this current plan and had no other questions.        Return to Clinic: 1 month, as needed                       This service was not originating from a related E/M service provided within the previous 7 days nor will  to an E/M service or procedure within the next 24 hours or my soonest available appointment.  Prevailing standard of care was able to be met in this audio-only visit.

## 2022-06-03 ENCOUNTER — TELEPHONE (OUTPATIENT)
Dept: PSYCHIATRY | Facility: CLINIC | Age: 37
End: 2022-06-03
Payer: COMMERCIAL

## 2022-06-03 NOTE — TELEPHONE ENCOUNTER
Spoke to patient and scheduled with Viv Caceres 06/09/2022 @ 8 am and Laurie Liang 06/14/22 @ 3p.

## 2022-06-03 NOTE — TELEPHONE ENCOUNTER
----- Message from Laurei Liang PA-C sent at 5/17/2022  4:24 PM CDT -----  Please schedule for therapy. Please schedule for 1 month up virtual.

## 2022-06-09 ENCOUNTER — OFFICE VISIT (OUTPATIENT)
Dept: PSYCHIATRY | Facility: CLINIC | Age: 37
End: 2022-06-09
Payer: COMMERCIAL

## 2022-06-09 DIAGNOSIS — F41.9 ANXIETY: Primary | ICD-10-CM

## 2022-06-09 DIAGNOSIS — F42.9 OBSESSIVE-COMPULSIVE DISORDER, UNSPECIFIED TYPE: ICD-10-CM

## 2022-06-09 PROCEDURE — 1159F MED LIST DOCD IN RCRD: CPT | Mod: CPTII,S$GLB,, | Performed by: SOCIAL WORKER

## 2022-06-09 PROCEDURE — 99999 PR PBB SHADOW E&M-EST. PATIENT-LVL II: ICD-10-PCS | Mod: PBBFAC,,, | Performed by: SOCIAL WORKER

## 2022-06-09 PROCEDURE — 99999 PR PBB SHADOW E&M-EST. PATIENT-LVL II: CPT | Mod: PBBFAC,,, | Performed by: SOCIAL WORKER

## 2022-06-09 PROCEDURE — 1159F PR MEDICATION LIST DOCUMENTED IN MEDICAL RECORD: ICD-10-PCS | Mod: CPTII,S$GLB,, | Performed by: SOCIAL WORKER

## 2022-06-09 PROCEDURE — 90834 PSYTX W PT 45 MINUTES: CPT | Mod: S$GLB,,, | Performed by: SOCIAL WORKER

## 2022-06-09 PROCEDURE — 90834 PR PSYCHOTHERAPY W/PATIENT, 45 MIN: ICD-10-PCS | Mod: S$GLB,,, | Performed by: SOCIAL WORKER

## 2022-06-09 NOTE — PROGRESS NOTES
"Date: 6/9/2022    Site: [facility]    Referral source: Laurie Liang PA-C    Clinical status of patient: Outpatient    Denny Atkinson, a 37 y.o. male, for initial evaluation visit.  Met with patient.    Chief complaint/reason for encounter: anxiety and OCD. Ct reported that he has been having anxiety and OCD since he was in middle school. He reported that he has obsessive thoughts mainly about his work. He reported that he tends to obsesses about details and it's hard for him to focus on other things. He has been on Luvox for the past 2-3 mos. He reported that there's some excessive worrying. Ct reported that his wife is a professor too. He reported that he gets obsessed with his office but not necessarily the rest of the house. Ct reported that Laurie recommended that he speak to a therapist.  He has a hx of depression. Ct has insight into his situation but reported that he can't help it. Ct has hx of being in therapy when he was in high school and college for a while. Last time in therapy was in college. He reported that he feels that it would be helpful to talk about it. He denies current SI/SA/HI/AVH.He denies prior psych hospitalizations.     History of present illness: Reviewed chart.     Pain: noncontributory    Symptoms:   Mood: depressed mood  Anxiety: excessive anxiety/worry and obsessions  Substance abuse: denied  Cognitive functioning: denied  Health behaviors: noncontributory        How often to you feel depressed? Due to OCD  How often do you feel anxious? Daily  How's your sleeping? Decent, " not great". He reported that it has been getting better but he drinks a lot of caffeine, doing work and stays up late. He reported that he has been cutting back on the caffeine. Ct reported that he is a work-a-holic        Psychiatric history: has participated in counseling/psychotherapy on an outpatient basis in the past and currently under psychiatric care   Hx of counseling- in High school and college  Hx of " psych inpatient- Ct denies  Psych Dx- OCD, Anxiety  Hx of violent behavior- Ct denies  Current SI?- Ct denies  Ever attempted suicide? Last time and how- Ct denies  Self-Harm? Cutting/Burning?- CT denies  Seeing things, hearing things no one else does?Ct denies  Homicidal Ideation?-Ct denies    Becker Suicide Severity Rating Scale  1. Have you wished you were dead or wished you could go to sleep and not wake up?   ______ Yes  ___x___ No  2.  Have you actually had any thoughts of killing yourself?   ______ Yes  ___x___ No  (If yes to 2, ask questions 3,4,5 and 6.  If No to 2, go directly to 6)  3. Have you been thinking about how you might do this?   ______ Yes  ___x___ No  4. Have you had these thoughts and had some intention of acting on them?   ______ Yes  ___x___ No  5.  Have you started to work out or worked out the details of how to kill yourself?  Do you intend to carry out this plan?   ______ Yes  ___x___ No  6. Have you ever done anything, started to do anything, or prepared to do anything to end your life?   ______ Yes  ___x___ No  If yes :  Were any of these in the past 3 months?   ______ Yes  ___x___ No      Medical history: Refer to chart  Family History   Problem Relation Age of Onset    Hypertension Father     Hyperlipidemia Father     Hypertension Brother     Hyperlipidemia Brother     Hypertension Paternal Grandmother     Cancer Paternal Grandfather     Hypertension Paternal Grandfather     Hyperlipidemia Paternal Grandfather      Family history of psychiatric illness:   Mom's side- anxiety, OCD SAMANTHA on both sides  Dad's side- anxiety, OCD    Trauma history:    Verbal/Emotional abuse- Ct denies  Physical abuse-Ct denies  Sexual abuse- Ct denies  Any major losses in your life or events that you would consider traumatic? Ct denies    Social history (marriage, employment, etc.):   Born and raised in [facility] [place]. He reported that he went to college in [place]. And moved back to [place] in  "2013.  Raised by-by mom and dad- they currently live in [place].   Highest level of education: PHD  Childhood history is described as " great". Ct has a brother who lives in [place]. Ct's mom was a stay at home mom.   Relationships / children:  with 1-2yr old daughter. Ct reported that he has been  for 10 years. Ct reported that he has a great relationship with his wife. She's a professor too. Ct reported that OCD is a stressor in his relationship but his wife knows he's trying.   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   Jain: Ct reported that he has a Higher Power   history.- Ct denies  Legal/Criminal history:- Ct denies    Substance use:  Alcohol: none  Drugs: none  Tobacco: Ct reported that he chews Nicotine gum for the past 10 years- addicted to the gum now it started as smoking cessation.   Caffeine: Coffee daily, Red bull sometimes. Ct reported that he has cut back due to being on the Luvox.     Any other addictions:   Sex, gambling, eating d/o- Ct reported that when he gets anxious, his appetite changes.   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 He reported that was his coping strategy for OCD.   If so, how long and how do you maintain sobriety? 18 years. Ct reported hx of alcohol, pills, THC.   Are you utilizing MAT?- Ct denies  How often do you drink alcohol? (age 1st use, last use, how often, what are you drinking)-Ct denies  Do you use any illegal or illicit drugs - (Cocaine, Methamphetamines, Opiates, Heroin, Xanax, Synthetics, THC)? (Age first use, last use, route of administration) Ct denies          If yes to gambling, - Ct denies  During the past 12 mos have you " become restless, irritable, or anxious when trying to stop/cut down on gambling?   During the past 12 months, have tried to keep your family or friends from knowing how much you gambled?  During the past 12 mos, did you have such financial trouble that you had to get help from family or friends?    Current medications and drug reactions (include OTC, herbal): see medication list     Strengths and liabilities: Strength: Patient accepts guidance/feedback, Strength: Patient is expressive/articulate., Strength: Patient is intelligent., Strength: Patient is motivated for change.    Strengths- What personal qualities do you have which we can build upon in treatment? Meticulous, when he needs to he can be hyper focused, paying attention to detail, friendly, easy going, can talk to anyone.     Needs- What would help you achieve your goals? Talking about his OCD in detail, being aware of his surroundings.     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 Virtual, in-person, any one on [facility]       Current Evaluation:     Mental Status Exam:  General Appearance:  unremarkable, age appropriate   Speech: normal tone, normal rate, normal pitch, normal volume      Level of Cooperation: cooperative      Thought Processes: normal and logical   Mood: steady      Thought Content: normal, no suicidality, no homicidality, delusions, or paranoia   Affect: congruent and appropriate   Orientation: Oriented x3   Memory: recent >  intact   Attention Span & Concentration: intact   Fund of General Knowledge: intact and appropriate to age and level of education   Abstract Reasoning: interpretation of similarities was abstract   Judgment & Insight: good, fair     Language intact     Diagnostic Impression - Plan:     No diagnosis found.    Plan:individual psychotherapy and Ct to continue to follow up with Laurie RON for psych med mgt   Pt to go to ED or call 911 if symptoms worsen or  if he has thoughts of harming self and/or others. Pt verbalized understanding.  Goal #1: Pt to learn CBT principles to learn how to identify and reframe maladaptive beliefs affecting mood.  Goal #2: Pt to learn relaxation tools and techniques    Pt is to attend supportive psychotherapy sessions.     Ct was provided MH resource packet which included local MH resources, the National Suicide Helpline number, coping skills/relaxation techniques, CBT information

## 2022-06-14 ENCOUNTER — OFFICE VISIT (OUTPATIENT)
Dept: PSYCHIATRY | Facility: CLINIC | Age: 37
End: 2022-06-14
Payer: COMMERCIAL

## 2022-06-14 DIAGNOSIS — F41.1 GAD (GENERALIZED ANXIETY DISORDER): ICD-10-CM

## 2022-06-14 DIAGNOSIS — F42.9 OBSESSIVE-COMPULSIVE DISORDER, UNSPECIFIED TYPE: Primary | ICD-10-CM

## 2022-06-14 PROCEDURE — 1159F PR MEDICATION LIST DOCUMENTED IN MEDICAL RECORD: ICD-10-PCS | Mod: CPTII,95,, | Performed by: PHYSICIAN ASSISTANT

## 2022-06-14 PROCEDURE — 99214 OFFICE O/P EST MOD 30 MIN: CPT | Mod: 95,,, | Performed by: PHYSICIAN ASSISTANT

## 2022-06-14 PROCEDURE — 1160F RVW MEDS BY RX/DR IN RCRD: CPT | Mod: CPTII,95,, | Performed by: PHYSICIAN ASSISTANT

## 2022-06-14 PROCEDURE — 1160F PR REVIEW ALL MEDS BY PRESCRIBER/CLIN PHARMACIST DOCUMENTED: ICD-10-PCS | Mod: CPTII,95,, | Performed by: PHYSICIAN ASSISTANT

## 2022-06-14 PROCEDURE — 1159F MED LIST DOCD IN RCRD: CPT | Mod: CPTII,95,, | Performed by: PHYSICIAN ASSISTANT

## 2022-06-14 PROCEDURE — 99214 PR OFFICE/OUTPT VISIT, EST, LEVL IV, 30-39 MIN: ICD-10-PCS | Mod: 95,,, | Performed by: PHYSICIAN ASSISTANT

## 2022-06-14 NOTE — PROGRESS NOTES
The patient location is: in his car in [place], [place]  The chief complaint leading to consultation is: OCD    Visit type: audiovisual    Face to Face time with patient: 25  35 minutes of total time spent on the encounter, which includes face to face time and non-face to face time preparing to see the patient (eg, review of tests), Obtaining and/or reviewing separately obtained history, Documenting clinical information in the electronic or other health record, Independently interpreting results (not separately reported) and communicating results to the patient/family/caregiver, or Care coordination (not separately reported).     Each patient to whom he or she provides medical services by telemedicine is:  (1) informed of the relationship between the physician and patient and the respective role of any other health care provider with respect to management of the patient; and (2) notified that he or she may decline to receive medical services by telemedicine and may withdraw from such care at any time.    Outpatient Psychiatry Follow-Up Visit (MD/NP)    6/14/2022    Clinical Status of Patient:  Outpatient (Ambulatory)    Chief Complaint:  Denny Atkinson is a 37 y.o. male who presents today for follow-up of anxiety.  Met with patient.      Interval History and Content of Current Session:  Interim Events/Subjective Report/Content of Current Session:   Denny is seen today for medication follow-up.  He reports that he is up the same old, same old.  The semester ended which is a huge relief.  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  Reports that things have been going really well.  He met with CECILIA Nam.  They are going to be seeing each other on a monthly basis.  Everything else has been going fairly well.  Stress has been reduced.  He is looking for to this summer, going to [place] to visit his in-laws.  He would like to maximize Luvox and then consider additional medication for symptom control.  He denies  suicidal or homicidal ideation.  No other complaints today.      GAD7 6/14/2022 4/12/2022 3/9/2022   1. Feeling nervous, anxious, or on edge? 1 1 2   2. Not being able to stop or control worrying? 1 1 1   3. Worrying too much about different things? 1 1 1   4. Trouble relaxing? 1 1 1   5. Being so restless that it is hard to sit still? 1 0 1   6. Becoming easily annoyed or irritable? 1 1 1   7. Feeling afraid as if something awful might happen? 1 0 1   8. If you checked off any problems, how difficult have these problems made it for you to do your work, take care of things at home, or get along with other people? 1 0 1   MAHESH-7 Score 7 5 8       Review of Systems   · PSYCHIATRIC: Pertinant items are noted in the narrative.  · RESPIRATORY: No shortness of breath.  · CARDIOVASCULAR: No tachycardia or chest pain.  · GASTROINTESTINAL: No nausea, vomiting, pain, constipation or diarrhea.    Past Medical, Family and Social History: The patient's past medical, family and social history have been reviewed and updated as appropriate within the electronic medical record - see encounter notes.    Compliance: yes    Side effects: None    Risk Parameters:  Patient reports no suicidal ideation  Patient reports no homicidal ideation  Patient reports no self-injurious behavior  Patient reports no violent behavior    Exam (detailed: at least 9 elements; comprehensive: all 15 elements)   Constitutional  Vitals:  Most recent vital signs, dated less than 90 days prior to this appointment, were reviewed.   There were no vitals filed for this visit.     General:  unremarkable, age appropriate     Musculoskeletal  Muscle Strength/Tone:  not examined   Gait & Station:  virtual visit     Psychiatric  Speech:  no latency; no press   Mood & Affect:  good  congruent and appropriate   Thought Process:  normal and logical   Associations:  intact   Thought Content:  normal, no suicidality, no homicidality, delusions, or paranoia   Insight:  intact    Judgement: behavior is adequate to circumstances   Orientation:  grossly intact   Memory: intact for content of interview   Language: grossly intact   Attention Span & Concentration:  able to focus   Fund of Knowledge:  intact and appropriate to age and level of education     Assessment and Diagnosis   Status/Progress: Based on the examination today, the patient's problem(s) is/are adequately but not ideally controlled.  New problems have not been presented today.   Co-morbidities, Diagnostic uncertainty and Lack of compliance are not complicating management of the primary condition.      General Impression:   OCD with good insight  MAHESH    Intervention/Counseling/Treatment Plan   · Medication Management: Continue current medications. The risks and benefits of medication were discussed with the patient.  · Counseling provided with patient as follows: importance of compliance with chosen treatment options was emphasized, risks and benefits of treatment options, including medications, were discussed with the patient, risk factor reduction, prognosis    Increase luvox to 250mg for 7 days then maximize to 300mg daily for OCD.  Consider antipsychotic augmentation.  Continue with CECILIA Nam.   Consider NAC.    Please go to emergency department if feeling as though you are a harm to yourself or others or if you are in crisis. Please call the clinic to report any worsening of symptoms or problems associated with medication.    Discussed with patient informed consent, risks vs. benefits, alternative treatments, side effect profile and the inherent unpredictability of individual responses to these treatments. The patient expresses understanding of the above and displays the capacity to agree with this current plan and had no other questions.      Return to Clinic: 1 month, as needed

## 2022-06-16 ENCOUNTER — TELEPHONE (OUTPATIENT)
Dept: PSYCHIATRY | Facility: CLINIC | Age: 37
End: 2022-06-16
Payer: COMMERCIAL

## 2022-06-22 ENCOUNTER — TELEPHONE (OUTPATIENT)
Dept: PSYCHIATRY | Facility: CLINIC | Age: 37
End: 2022-06-22
Payer: COMMERCIAL

## 2022-07-07 ENCOUNTER — OFFICE VISIT (OUTPATIENT)
Dept: PSYCHIATRY | Facility: CLINIC | Age: 37
End: 2022-07-07
Payer: COMMERCIAL

## 2022-07-07 DIAGNOSIS — F41.1 GAD (GENERALIZED ANXIETY DISORDER): ICD-10-CM

## 2022-07-07 DIAGNOSIS — F42.9 OBSESSIVE-COMPULSIVE DISORDER, UNSPECIFIED TYPE: Primary | ICD-10-CM

## 2022-07-07 PROCEDURE — 1159F PR MEDICATION LIST DOCUMENTED IN MEDICAL RECORD: ICD-10-PCS | Mod: CPTII,S$GLB,, | Performed by: SOCIAL WORKER

## 2022-07-07 PROCEDURE — 99999 PR PBB SHADOW E&M-EST. PATIENT-LVL II: CPT | Mod: PBBFAC,,, | Performed by: SOCIAL WORKER

## 2022-07-07 PROCEDURE — 90834 PR PSYCHOTHERAPY W/PATIENT, 45 MIN: ICD-10-PCS | Mod: S$GLB,,, | Performed by: SOCIAL WORKER

## 2022-07-07 PROCEDURE — 99999 PR PBB SHADOW E&M-EST. PATIENT-LVL II: ICD-10-PCS | Mod: PBBFAC,,, | Performed by: SOCIAL WORKER

## 2022-07-07 PROCEDURE — 90834 PSYTX W PT 45 MINUTES: CPT | Mod: S$GLB,,, | Performed by: SOCIAL WORKER

## 2022-07-07 PROCEDURE — 1159F MED LIST DOCD IN RCRD: CPT | Mod: CPTII,S$GLB,, | Performed by: SOCIAL WORKER

## 2022-07-07 NOTE — PROGRESS NOTES
Individual Psychotherapy (PhD/LCSW)    7/7/2022    Site:  [facility]         Therapeutic Intervention: Met with patient.  Outpatient - Insight oriented psychotherapy 45 min - CPT code 08449, Outpatient - Behavior modifying psychotherapy 45 min - CPT code 84378 and Outpatient - Supportive psychotherapy 45 min - CPT Code 34999    Chief complaint/reason for encounter: anxiety and Obsessive Compulsive D/O             Interval history and content of current session: Ct was referred to tx by Laurie RON to address anxiety and OCD. Ct arrived to session and was fully engaged. He reported that the increase in the Luvox has helpled with his OCD he reported that his wife noticed a difference. Ct and SW processed some of the causes of anxiety how to challenge irrational thoughts, unwanted thoughts, and intrusive thoughts. SW and ct processed the coping skills of thought labeling tracking thoughts. Ct to continue in 1:1 sessions.    Treatment plan:  · Target symptoms: anxiety , OCD/thoughts  · Why chosen therapy is appropriate versus another modality: relevant to diagnosis, patient responds to this modality, evidence based practice  · Outcome monitoring methods: self-report  · Therapeutic intervention type: insight oriented psychotherapy, behavior modifying psychotherapy, supportive psychotherapy    Risk parameters:  Patient reports no suicidal ideation  Patient reports no homicidal ideation  Patient reports no self-injurious behavior  Patient reports no violent behavior          CSSRS was completed: No risk    Verbal deficits: None    Patient's response to intervention:  The patient's response to intervention is accepting.    Progress toward goals and other mental status changes:  The patient's progress toward goals is fair , good, some.    Diagnosis:     ICD-10-CM ICD-9-CM   1. Obsessive-compulsive disorder, unspecified type  F42.9 300.3   2. MAHESH (generalized anxiety disorder)  F41.1 300.02       Plan:  individual psychotherapy  and Ct to continue to see Laurie RON for psych med mgt Pt to go to ED or call 911 if symptoms worsen or if he has thoughts of harming self and/or others. Pt verbalized understanding.    Return to clinic: as scheduled    Length of Service (minutes): 45      Each patient to whom he or she provides medical services by telemedicine is: (1) informed of the relationship between the physician and patient and the respective role of any other health care provider with respect to management of the patient; and (2) notified that he or she may decline to receive medical services by telemedicine and may withdraw from such care at any time.

## 2022-07-25 ENCOUNTER — OFFICE VISIT (OUTPATIENT)
Dept: PSYCHIATRY | Facility: CLINIC | Age: 37
End: 2022-07-25
Payer: COMMERCIAL

## 2022-07-25 VITALS
DIASTOLIC BLOOD PRESSURE: 83 MMHG | HEIGHT: 74 IN | WEIGHT: 231.69 LBS | HEART RATE: 67 BPM | SYSTOLIC BLOOD PRESSURE: 123 MMHG | BODY MASS INDEX: 29.73 KG/M2

## 2022-07-25 DIAGNOSIS — F42.2 MIXED OBSESSIONAL THOUGHTS AND ACTS: ICD-10-CM

## 2022-07-25 DIAGNOSIS — F41.1 GAD (GENERALIZED ANXIETY DISORDER): Primary | ICD-10-CM

## 2022-07-25 DIAGNOSIS — F42.9 OBSESSIVE-COMPULSIVE DISORDER, UNSPECIFIED TYPE: Primary | ICD-10-CM

## 2022-07-25 DIAGNOSIS — F42.9 OBSESSIVE-COMPULSIVE DISORDER, UNSPECIFIED TYPE: ICD-10-CM

## 2022-07-25 DIAGNOSIS — F41.1 GAD (GENERALIZED ANXIETY DISORDER): ICD-10-CM

## 2022-07-25 PROCEDURE — 1159F MED LIST DOCD IN RCRD: CPT | Mod: CPTII,S$GLB,, | Performed by: SOCIAL WORKER

## 2022-07-25 PROCEDURE — 3008F PR BODY MASS INDEX (BMI) DOCUMENTED: ICD-10-PCS | Mod: CPTII,S$GLB,, | Performed by: PHYSICIAN ASSISTANT

## 2022-07-25 PROCEDURE — 99214 OFFICE O/P EST MOD 30 MIN: CPT | Mod: S$GLB,,, | Performed by: PHYSICIAN ASSISTANT

## 2022-07-25 PROCEDURE — 1160F PR REVIEW ALL MEDS BY PRESCRIBER/CLIN PHARMACIST DOCUMENTED: ICD-10-PCS | Mod: CPTII,S$GLB,, | Performed by: PHYSICIAN ASSISTANT

## 2022-07-25 PROCEDURE — 3074F PR MOST RECENT SYSTOLIC BLOOD PRESSURE < 130 MM HG: ICD-10-PCS | Mod: CPTII,S$GLB,, | Performed by: PHYSICIAN ASSISTANT

## 2022-07-25 PROCEDURE — 3008F BODY MASS INDEX DOCD: CPT | Mod: CPTII,S$GLB,, | Performed by: PHYSICIAN ASSISTANT

## 2022-07-25 PROCEDURE — 3079F PR MOST RECENT DIASTOLIC BLOOD PRESSURE 80-89 MM HG: ICD-10-PCS | Mod: CPTII,S$GLB,, | Performed by: PHYSICIAN ASSISTANT

## 2022-07-25 PROCEDURE — 3079F DIAST BP 80-89 MM HG: CPT | Mod: CPTII,S$GLB,, | Performed by: PHYSICIAN ASSISTANT

## 2022-07-25 PROCEDURE — 99214 PR OFFICE/OUTPT VISIT, EST, LEVL IV, 30-39 MIN: ICD-10-PCS | Mod: S$GLB,,, | Performed by: PHYSICIAN ASSISTANT

## 2022-07-25 PROCEDURE — 1159F MED LIST DOCD IN RCRD: CPT | Mod: CPTII,S$GLB,, | Performed by: PHYSICIAN ASSISTANT

## 2022-07-25 PROCEDURE — 99999 PR PBB SHADOW E&M-EST. PATIENT-LVL III: ICD-10-PCS | Mod: PBBFAC,,, | Performed by: PHYSICIAN ASSISTANT

## 2022-07-25 PROCEDURE — 99999 PR PBB SHADOW E&M-EST. PATIENT-LVL II: ICD-10-PCS | Mod: PBBFAC,,, | Performed by: SOCIAL WORKER

## 2022-07-25 PROCEDURE — 90837 PSYTX W PT 60 MINUTES: CPT | Mod: S$GLB,,, | Performed by: SOCIAL WORKER

## 2022-07-25 PROCEDURE — 1160F RVW MEDS BY RX/DR IN RCRD: CPT | Mod: CPTII,S$GLB,, | Performed by: PHYSICIAN ASSISTANT

## 2022-07-25 PROCEDURE — 99999 PR PBB SHADOW E&M-EST. PATIENT-LVL II: CPT | Mod: PBBFAC,,, | Performed by: SOCIAL WORKER

## 2022-07-25 PROCEDURE — 99999 PR PBB SHADOW E&M-EST. PATIENT-LVL III: CPT | Mod: PBBFAC,,, | Performed by: PHYSICIAN ASSISTANT

## 2022-07-25 PROCEDURE — 1159F PR MEDICATION LIST DOCUMENTED IN MEDICAL RECORD: ICD-10-PCS | Mod: CPTII,S$GLB,, | Performed by: SOCIAL WORKER

## 2022-07-25 PROCEDURE — 3074F SYST BP LT 130 MM HG: CPT | Mod: CPTII,S$GLB,, | Performed by: PHYSICIAN ASSISTANT

## 2022-07-25 PROCEDURE — 1159F PR MEDICATION LIST DOCUMENTED IN MEDICAL RECORD: ICD-10-PCS | Mod: CPTII,S$GLB,, | Performed by: PHYSICIAN ASSISTANT

## 2022-07-25 PROCEDURE — 90837 PR PSYCHOTHERAPY W/PATIENT, 60 MIN: ICD-10-PCS | Mod: S$GLB,,, | Performed by: SOCIAL WORKER

## 2022-07-25 RX ORDER — FLUVOXAMINE MALEATE 100 MG/1
300 TABLET, COATED ORAL NIGHTLY
Qty: 180 TABLET | Refills: 1 | Status: SHIPPED | OUTPATIENT
Start: 2022-07-25 | End: 2022-10-24

## 2022-07-25 NOTE — PATIENT INSTRUCTIONS
Life extension (brand) N-Acetyl Cysteine  Now foods NAC   600mg twice daily     Please go to emergency department if feeling as though you are a harm to yourself or others or if you are in crisis. Please call the clinic to report any worsening of symptoms or problems associated with medication.

## 2022-07-25 NOTE — PROGRESS NOTES
Outpatient Psychiatry Follow-Up Visit (MD/NP)    7/25/2022    Clinical Status of Patient:  Outpatient (Ambulatory)    Chief Complaint:  Denny Atkinson is a 37 y.o. male who presents today for follow-up of anxiety.  Met with patient.      Interval History and Content of Current Session:  Interim Events/Subjective Report/Content of Current Session:   Denny is seen today for medication follow-up.  He reports that he has been very busy.  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  States that OCD symptoms have improved with Luvox.  He does feel pretty tired in the morning.  Recommended split dosing 100 mg in the morning and 200 mg at night.  He has lost some weight, has gone from 240 lb to 231 lb.  He feels his thoughts are less obsessive, positives outweighing negatives of medication.  He has reduced nicotine gum, caffeine.  His blood pressure and pulse are within normal limits.  He is going to resume playing basketball shortly.  He is eating healthy diet, appetite is decent.  He is working with CECILIA Nam for psychotherapy.  He denies suicidal or homicidal ideation.  No other complaints today.    FROM PREVIOUS HPI  Denny is seen today for medication follow-up.  He reports that he is up the same old, same old.  The semester ended which is a huge relief.  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  Reports that things have been going really well.  He met with CECILIA Nam.  They are going to be seeing each other on a monthly basis.  Everything else has been going fairly well.  Stress has been reduced.  He is looking for to this summer, going to [place] to visit his in-laws.  He would like to maximize Luvox and then consider additional medication for symptom control.  He denies suicidal or homicidal ideation.  No other complaints today.    GAD7 7/25/2022 6/14/2022 4/12/2022    1. Feeling nervous, anxious, or on edge? 1 1 1   2. Not being able to stop or control worrying? 1 1 1   3. Worrying too much about different things? 1 1 1   4. Trouble relaxing? 1 1 1   5. Being so restless that it is hard to sit still? 1 1 0   6. Becoming easily annoyed or irritable? 1 1 1   7. Feeling afraid as if something awful might happen? 0 1 0   8. If you checked off any problems, how difficult have these problems made it for you to do your work, take care of things at home, or get along with other people? 1 1 0   MAHESH-7 Score 6 7 5     PHQ9 7/25/2022   Little interest or pleasure in doing things: Not at all   Feeling down, depressed or hopeless: Not at all   Trouble falling asleep, staying asleep, or sleeping too much: More than half the days   Feeling tired or having little energy: Several days   Poor appetite or overeating: Several days   Feeling bad about yourself- or that you are a failure or have let yourself or family down Several days   Trouble concentrating on things, such as reading the newspaper or watching television: Several days   Moving or speaking so slowly that other people could have noticed. Or the opposite- being so fidgety or restless that you have been moving around a lot more than usual: Several days   Thoughts that you would be better off dead or hurting yourself in some way: Not at all   If you indicated you have experienced any of the aforementioned problems, how difficult have these problems made it for you to do your work, take care of things at home or get along with other people? Somewhat difficult   Total Score 7       Review of Systems   · PSYCHIATRIC: Pertinant items are noted in the narrative.  · RESPIRATORY: No shortness of breath.  · CARDIOVASCULAR: No tachycardia or chest pain.  · GASTROINTESTINAL: No nausea, vomiting, pain, constipation or diarrhea.    Past Medical, Family and Social History: The patient's past medical, family and social history have been reviewed and  updated as appropriate within the electronic medical record - see encounter notes.    Compliance: yes    Side effects: None    Risk Parameters:  Patient reports no suicidal ideation  Patient reports no homicidal ideation  Patient reports no self-injurious behavior  Patient reports no violent behavior    Exam (detailed: at least 9 elements; comprehensive: all 15 elements)   Constitutional  Vitals:  Most recent vital signs, dated less than 90 days prior to this appointment, were reviewed.   Vitals:    07/25/22 0828   BP: 123/83   Pulse: 67        General:  unremarkable, age appropriate     Musculoskeletal  Muscle Strength/Tone:  not examined   Gait & Station:  virtual visit     Psychiatric  Speech:  no latency; no press   Mood & Affect:  good  congruent and appropriate   Thought Process:  normal and logical   Associations:  intact   Thought Content:  normal, no suicidality, no homicidality, delusions, or paranoia   Insight:  intact   Judgement: behavior is adequate to circumstances   Orientation:  grossly intact   Memory: intact for content of interview   Language: grossly intact   Attention Span & Concentration:  able to focus   Fund of Knowledge:  intact and appropriate to age and level of education     Assessment and Diagnosis   Status/Progress: Based on the examination today, the patient's problem(s) is/are adequately but not ideally controlled.  New problems have not been presented today.   Co-morbidities, Diagnostic uncertainty and Lack of compliance are not complicating management of the primary condition.      General Impression:   OCD with good insight  MAHESH    Intervention/Counseling/Treatment Plan   · Medication Management: Continue current medications. The risks and benefits of medication were discussed with the patient.  · Counseling provided with patient as follows: importance of compliance with chosen treatment options was emphasized, risks and benefits of treatment options, including medications, were  discussed with the patient, risk factor reduction, prognosis    Adjust Luvox to 100mg daily and 200mg nightly for OCD.  Consider antipsychotic augmentation.  Continue with CECILIA Nam.   Recommended NAC 600mg twice daily.     Please go to emergency department if feeling as though you are a harm to yourself or others or if you are in crisis. Please call the clinic to report any worsening of symptoms or problems associated with medication.    Discussed with patient informed consent, risks vs. benefits, alternative treatments, side effect profile and the inherent unpredictability of individual responses to these treatments. The patient expresses understanding of the above and displays the capacity to agree with this current plan and had no other questions.      Return to Clinic: 1 month, as needed

## 2022-07-25 NOTE — PROGRESS NOTES
Individual Psychotherapy (PhD/LCSW)    7/25/2022    Site:  [facility]         Therapeutic Intervention: Met with patient.  Outpatient - Insight oriented psychotherapy 60 min - CPT code 01957, Outpatient - Behavior modifying psychotherapy 60 min - CPT code 94497 and Outpatient - Supportive psychotherapy 60 min - CPT Code 22784    Chief complaint/reason for encounter: anxiety and obsessive thinking             Interval history and content of current session: Ct was referred to tx by Laurie RON to address anxiety and obsessive thinking. Ct arrived to session on time and was fully engaged. cT shared that the Luvox continues to work and that his wife has mentioned on 2 occassions that she sees a difference in him where he is not asking a lot of questions like he typically would. Ct shared that he still has anxiety at baseline but that it is not as bad as it has been in the past. Ct shared that he has been using the coping skill of labeling his thoughts as just thoughts. He reported that it did work a few times. He expressed concerns about the times when he just does not feel that he can use his coping skills because he's too far in to the thoughts. SW and ct discussed ct recognizing that just because he is thinking things, does not make them real. SW and ct did an exercise to demonstrate cognitive fusion. Ct was receptive to feedback and able to recognize that his thoughts and reality are 2 different things. Ct to continue in 1:1 sessions.     Treatment plan:  · Target symptoms: distractability, lack of focus, anxiety , obsessive thinking  · Why chosen therapy is appropriate versus another modality: relevant to diagnosis, patient responds to this modality, evidence based practice  · Outcome monitoring methods: self-report  · Therapeutic intervention type: insight oriented psychotherapy, behavior modifying psychotherapy, supportive psychotherapy    Risk parameters:  Patient reports no suicidal ideation  Patient reports no  homicidal ideation  Patient reports no self-injurious behavior  Patient reports no violent behavior          CSSRS was completed: No risk    Verbal deficits: None    Patient's response to intervention:  The patient's response to intervention is accepting.    Progress toward goals and other mental status changes:  The patient's progress toward goals is fair , good.    Diagnosis:     ICD-10-CM ICD-9-CM   1. MAHESH (generalized anxiety disorder)  F41.1 300.02   2. Obsessive-compulsive disorder, unspecified type  F42.9 300.3   3. Mixed obsessional thoughts and acts  F42.2 300.3       Plan:  individual psychotherapy and Ct to continue to follow up with Laurie ba psych med mgt Pt to go to ED or call 911 if symptoms worsen or if he has thoughts of harming self and/or others. Pt verbalized understanding.    Return to clinic: as scheduled    Length of Service (minutes): 60      Each patient to whom he or she provides medical services by telemedicine is: (1) informed of the relationship between the physician and patient and the respective role of any other health care provider with respect to management of the patient; and (2) notified that he or she may decline to receive medical services by telemedicine and may withdraw from such care at any time.

## 2022-08-25 ENCOUNTER — OFFICE VISIT (OUTPATIENT)
Dept: PSYCHIATRY | Facility: CLINIC | Age: 37
End: 2022-08-25
Payer: COMMERCIAL

## 2022-08-25 VITALS
DIASTOLIC BLOOD PRESSURE: 82 MMHG | HEIGHT: 74 IN | WEIGHT: 230.5 LBS | HEART RATE: 68 BPM | BODY MASS INDEX: 29.58 KG/M2 | SYSTOLIC BLOOD PRESSURE: 134 MMHG

## 2022-08-25 DIAGNOSIS — F42.9 OBSESSIVE-COMPULSIVE DISORDER, UNSPECIFIED TYPE: Primary | ICD-10-CM

## 2022-08-25 PROCEDURE — 99214 OFFICE O/P EST MOD 30 MIN: CPT | Mod: S$GLB,,, | Performed by: PHYSICIAN ASSISTANT

## 2022-08-25 PROCEDURE — 99214 PR OFFICE/OUTPT VISIT, EST, LEVL IV, 30-39 MIN: ICD-10-PCS | Mod: S$GLB,,, | Performed by: PHYSICIAN ASSISTANT

## 2022-08-25 PROCEDURE — 1160F RVW MEDS BY RX/DR IN RCRD: CPT | Mod: CPTII,S$GLB,, | Performed by: PHYSICIAN ASSISTANT

## 2022-08-25 PROCEDURE — 3079F PR MOST RECENT DIASTOLIC BLOOD PRESSURE 80-89 MM HG: ICD-10-PCS | Mod: CPTII,S$GLB,, | Performed by: PHYSICIAN ASSISTANT

## 2022-08-25 PROCEDURE — 1159F PR MEDICATION LIST DOCUMENTED IN MEDICAL RECORD: ICD-10-PCS | Mod: CPTII,S$GLB,, | Performed by: PHYSICIAN ASSISTANT

## 2022-08-25 PROCEDURE — 99999 PR PBB SHADOW E&M-EST. PATIENT-LVL III: CPT | Mod: PBBFAC,,, | Performed by: PHYSICIAN ASSISTANT

## 2022-08-25 PROCEDURE — 3008F PR BODY MASS INDEX (BMI) DOCUMENTED: ICD-10-PCS | Mod: CPTII,S$GLB,, | Performed by: PHYSICIAN ASSISTANT

## 2022-08-25 PROCEDURE — 1160F PR REVIEW ALL MEDS BY PRESCRIBER/CLIN PHARMACIST DOCUMENTED: ICD-10-PCS | Mod: CPTII,S$GLB,, | Performed by: PHYSICIAN ASSISTANT

## 2022-08-25 PROCEDURE — 3075F PR MOST RECENT SYSTOLIC BLOOD PRESS GE 130-139MM HG: ICD-10-PCS | Mod: CPTII,S$GLB,, | Performed by: PHYSICIAN ASSISTANT

## 2022-08-25 PROCEDURE — 1159F MED LIST DOCD IN RCRD: CPT | Mod: CPTII,S$GLB,, | Performed by: PHYSICIAN ASSISTANT

## 2022-08-25 PROCEDURE — 90833 PR PSYCHOTHERAPY W/PATIENT W/E&M, 30 MIN (ADD ON): ICD-10-PCS | Mod: S$GLB,,, | Performed by: PHYSICIAN ASSISTANT

## 2022-08-25 PROCEDURE — 99999 PR PBB SHADOW E&M-EST. PATIENT-LVL III: ICD-10-PCS | Mod: PBBFAC,,, | Performed by: PHYSICIAN ASSISTANT

## 2022-08-25 PROCEDURE — 3079F DIAST BP 80-89 MM HG: CPT | Mod: CPTII,S$GLB,, | Performed by: PHYSICIAN ASSISTANT

## 2022-08-25 PROCEDURE — 3075F SYST BP GE 130 - 139MM HG: CPT | Mod: CPTII,S$GLB,, | Performed by: PHYSICIAN ASSISTANT

## 2022-08-25 PROCEDURE — 90833 PSYTX W PT W E/M 30 MIN: CPT | Mod: S$GLB,,, | Performed by: PHYSICIAN ASSISTANT

## 2022-08-25 PROCEDURE — 3008F BODY MASS INDEX DOCD: CPT | Mod: CPTII,S$GLB,, | Performed by: PHYSICIAN ASSISTANT

## 2022-08-25 RX ORDER — CYSTEINE HCL 500 MG
1 CAPSULE ORAL DAILY
COMMUNITY

## 2022-08-25 NOTE — PROGRESS NOTES
Outpatient Psychiatry Follow-Up Visit (MD/NP)    8/25/2022    Clinical Status of Patient:  Outpatient (Ambulatory)    Chief Complaint:  Denny Atkinson is a 37 y.o. male who presents today for follow-up of anxiety.  Met with patient.      Interval History and Content of Current Session:  Interim Events/Subjective Report/Content of Current Session:   Denny is seen today for medication follow-up.  He reports that he is doing well at this time.  Utilizing over-the-counter  mg twice daily he is tolerating without issue but is unsure benefit.  Plan will be to increase to 1200 mg twice daily in the coming month.  Still tolerating Luvox well.  He does feel that OCD symptoms have improved.  Discussed goal for improvement in not complete resolution.  He is understanding of this.  Discussed utilizing naltrexone or aripiprazole in the future.  Discussed functionality versus nonfunctioning and this will be our baseline.  He has lost 5 lb since he went on vacation recently.  He denies suicidal or homicidal ideation.  No other complaints today. He will not be able to see Viv much at all due lack of coverage for therapy visits.     FROM PREVIOUS HPI  Denny is seen today for medication follow-up.  He reports that he has been very busy.  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  States that OCD symptoms have improved with Luvox.  He does feel pretty tired in the morning.  Recommended split dosing 100 mg in the morning and 200 mg at night.  He has lost some weight, has gone from 240 lb to 231 lb.  He feels his thoughts are less obsessive, positives outweighing negatives of medication.  He has reduced nicotine gum, caffeine.  His blood pressure and pulse are within normal limits.  He is going to resume playing basketball shortly.  He is eating healthy diet, appetite is decent.  He is  working with CECILIA Nam for psychotherapy.  He denies suicidal or homicidal ideation.  No other complaints today.    GAD7 8/25/2022 7/25/2022 6/14/2022   1. Feeling nervous, anxious, or on edge? 1 1 1   2. Not being able to stop or control worrying? 1 1 1   3. Worrying too much about different things? 1 1 1   4. Trouble relaxing? 1 1 1   5. Being so restless that it is hard to sit still? 0 1 1   6. Becoming easily annoyed or irritable? 1 1 1   7. Feeling afraid as if something awful might happen? 0 0 1   8. If you checked off any problems, how difficult have these problems made it for you to do your work, take care of things at home, or get along with other people? 1 1 1   MAHESH-7 Score 5 6 7     PHQ9 8/25/2022   Little interest or pleasure in doing things: Not at all   Feeling down, depressed or hopeless: Not at all   Trouble falling asleep, staying asleep, or sleeping too much: Several days   Feeling tired or having little energy: Several days   Poor appetite or overeating: Several days   Feeling bad about yourself- or that you are a failure or have let yourself or family down Not at all   Trouble concentrating on things, such as reading the newspaper or watching television: Several days   Moving or speaking so slowly that other people could have noticed. Or the opposite- being so fidgety or restless that you have been moving around a lot more than usual: Several days   Thoughts that you would be better off dead or hurting yourself in some way: Not at all   If you indicated you have experienced any of the aforementioned problems, how difficult have these problems made it for you to do your work, take care of things at home or get along with other people? Not difficult at all   Total Score 5     Psychotherapy:  · Target symptoms: anxiety , adjustment, work stress, OCD  · Why chosen therapy is appropriate versus another modality: relevant to diagnosis  · Outcome monitoring methods: self-report, observation,  checklist/rating scale  · Therapeutic intervention type: supportive psychotherapy  · Topics discussed/themes: work stress, building skills sets for symptom management, symptom recognition, life stage transitional issues  · The patient's response to the intervention is accepting. The patient's progress toward treatment goals is good.   · Duration of intervention: 16 minutes.    Review of Systems   · PSYCHIATRIC: Pertinant items are noted in the narrative.  · RESPIRATORY: No shortness of breath.  · CARDIOVASCULAR: No tachycardia or chest pain.  · GASTROINTESTINAL: No nausea, vomiting, pain, constipation or diarrhea.    Past Medical, Family and Social History: The patient's past medical, family and social history have been reviewed and updated as appropriate within the electronic medical record - see encounter notes.    Compliance: yes    Side effects: None    Risk Parameters:  Patient reports no suicidal ideation  Patient reports no homicidal ideation  Patient reports no self-injurious behavior  Patient reports no violent behavior    Exam (detailed: at least 9 elements; comprehensive: all 15 elements)   Constitutional  Vitals:  Most recent vital signs, dated less than 90 days prior to this appointment, were reviewed.   Vitals:    08/25/22 0840   BP: 134/82   Pulse: 68        General:  unremarkable, age appropriate     Musculoskeletal  Muscle Strength/Tone:  not examined   Gait & Station:  virtual visit     Psychiatric  Speech:  no latency; no press   Mood & Affect:  good  congruent and appropriate   Thought Process:  normal and logical   Associations:  intact   Thought Content:  normal, no suicidality, no homicidality, delusions, or paranoia   Insight:  intact   Judgement: behavior is adequate to circumstances   Orientation:  grossly intact   Memory: intact for content of interview   Language: grossly intact   Attention Span & Concentration:  able to focus   Fund of Knowledge:  intact and appropriate to age and level  of education     Assessment and Diagnosis   Status/Progress: Based on the examination today, the patient's problem(s) is/are adequately but not ideally controlled.  New problems have not been presented today.   Co-morbidities, Diagnostic uncertainty and Lack of compliance are not complicating management of the primary condition.      General Impression:   OCD with good insight  MAHESH    Intervention/Counseling/Treatment Plan   · Medication Management: Continue current medications. The risks and benefits of medication were discussed with the patient.  · Counseling provided with patient as follows: importance of compliance with chosen treatment options was emphasized, risks and benefits of treatment options, including medications, were discussed with the patient, risk factor reduction, prognosis    Continue Luvox to 100mg daily and 200mg nightly for OCD.  Consider antipsychotic augmentation.  Continue with CECILIA Nam.   Continue NAC 600mg twice daily.     Please go to emergency department if feeling as though you are a harm to yourself or others or if you are in crisis. Please call the clinic to report any worsening of symptoms or problems associated with medication.    Discussed with patient informed consent, risks vs. benefits, alternative treatments, side effect profile and the inherent unpredictability of individual responses to these treatments. The patient expresses understanding of the above and displays the capacity to agree with this current plan and had no other questions.      Return to Clinic: 3 months, as needed

## 2022-09-12 ENCOUNTER — PATIENT MESSAGE (OUTPATIENT)
Dept: PSYCHIATRY | Facility: CLINIC | Age: 37
End: 2022-09-12
Payer: COMMERCIAL

## 2022-10-25 ENCOUNTER — OFFICE VISIT (OUTPATIENT)
Dept: ORTHOPEDICS | Facility: CLINIC | Age: 37
End: 2022-10-25
Payer: COMMERCIAL

## 2022-10-25 ENCOUNTER — HOSPITAL ENCOUNTER (OUTPATIENT)
Dept: RADIOLOGY | Facility: HOSPITAL | Age: 37
Discharge: HOME | End: 2022-10-25
Attending: ORTHOPAEDIC SURGERY
Payer: COMMERCIAL

## 2022-10-25 VITALS — RESPIRATION RATE: 18 BRPM | BODY MASS INDEX: 29.58 KG/M2 | HEIGHT: 74 IN | WEIGHT: 230.5 LBS

## 2022-10-25 DIAGNOSIS — M25.512 LEFT SHOULDER PAIN, UNSPECIFIED CHRONICITY: ICD-10-CM

## 2022-10-25 DIAGNOSIS — M25.512 LEFT SHOULDER PAIN, UNSPECIFIED CHRONICITY: Primary | ICD-10-CM

## 2022-10-25 DIAGNOSIS — S43.422A SPRAIN OF LEFT ROTATOR CUFF CAPSULE, INITIAL ENCOUNTER: Primary | ICD-10-CM

## 2022-10-25 PROCEDURE — 73030 XR SHOULDER TRAUMA 3 VIEW LEFT: ICD-10-PCS | Mod: 26,LT,, | Performed by: RADIOLOGY

## 2022-10-25 PROCEDURE — 99999 PR PBB SHADOW E&M-EST. PATIENT-LVL III: CPT | Mod: PBBFAC,,, | Performed by: ORTHOPAEDIC SURGERY

## 2022-10-25 PROCEDURE — 99999 PR PBB SHADOW E&M-EST. PATIENT-LVL III: ICD-10-PCS | Mod: PBBFAC,,, | Performed by: ORTHOPAEDIC SURGERY

## 2022-10-25 PROCEDURE — 20610 DRAIN/INJ JOINT/BURSA W/O US: CPT | Mod: LT,S$GLB,, | Performed by: ORTHOPAEDIC SURGERY

## 2022-10-25 PROCEDURE — 73030 X-RAY EXAM OF SHOULDER: CPT | Mod: 26,LT,, | Performed by: RADIOLOGY

## 2022-10-25 PROCEDURE — 1159F MED LIST DOCD IN RCRD: CPT | Mod: CPTII,S$GLB,, | Performed by: ORTHOPAEDIC SURGERY

## 2022-10-25 PROCEDURE — 73030 X-RAY EXAM OF SHOULDER: CPT | Mod: TC,PN,LT

## 2022-10-25 PROCEDURE — 1160F PR REVIEW ALL MEDS BY PRESCRIBER/CLIN PHARMACIST DOCUMENTED: ICD-10-PCS | Mod: CPTII,S$GLB,, | Performed by: ORTHOPAEDIC SURGERY

## 2022-10-25 PROCEDURE — 99204 PR OFFICE/OUTPT VISIT, NEW, LEVL IV, 45-59 MIN: ICD-10-PCS | Mod: 25,S$GLB,, | Performed by: ORTHOPAEDIC SURGERY

## 2022-10-25 PROCEDURE — 20610 LARGE JOINT ASPIRATION/INJECTION: L GLENOHUMERAL: ICD-10-PCS | Mod: LT,S$GLB,, | Performed by: ORTHOPAEDIC SURGERY

## 2022-10-25 PROCEDURE — 1160F RVW MEDS BY RX/DR IN RCRD: CPT | Mod: CPTII,S$GLB,, | Performed by: ORTHOPAEDIC SURGERY

## 2022-10-25 PROCEDURE — 99204 OFFICE O/P NEW MOD 45 MIN: CPT | Mod: 25,S$GLB,, | Performed by: ORTHOPAEDIC SURGERY

## 2022-10-25 PROCEDURE — 1159F PR MEDICATION LIST DOCUMENTED IN MEDICAL RECORD: ICD-10-PCS | Mod: CPTII,S$GLB,, | Performed by: ORTHOPAEDIC SURGERY

## 2022-10-25 RX ORDER — METHYLPREDNISOLONE ACETATE 80 MG/ML
80 INJECTION, SUSPENSION INTRA-ARTICULAR; INTRALESIONAL; INTRAMUSCULAR; SOFT TISSUE
Status: DISCONTINUED | OUTPATIENT
Start: 2022-10-25 | End: 2022-10-25 | Stop reason: HOSPADM

## 2022-10-25 RX ADMIN — METHYLPREDNISOLONE ACETATE 80 MG: 80 INJECTION, SUSPENSION INTRA-ARTICULAR; INTRALESIONAL; INTRAMUSCULAR; SOFT TISSUE at 02:10

## 2022-10-25 NOTE — PROGRESS NOTES
CC:  37-year-old male presents for evaluation of left shoulder pain.  The patient states that he was in [place] last week when he fell down some stairs backwards.  He landed on his back and then slid down about 6 stairs.  He has had pain and difficulty with overhead activities with the left shoulder since that time.  He has not really had any treatment for it.  He has been using ice and NSAIDs.  He states his pain is actually gotten somewhat better but he still having difficulty with overhead activities and does have residual pain he rates as a 5/10.    Past Medical History:   Diagnosis Date    Allergy     Anxiety        Sh    Current Outpatient Medications on File Prior to Visit   Medication Sig Dispense Refill    cetirizine (ZYRTEC) 10 MG tablet Take 10 mg by mouth once daily.      cysteine HCl (CYSTEINE, L-CYSTEINE,) 500 mg Cap Take by mouth. Patient taking 600 MG BID      EPINEPHrine (EPIPEN) 0.3 mg/0.3 mL AtIn Inject 0.3 mLs (0.3 mg total) into the muscle once. for 1 dose 0.3 mL 1    fluticasone (FLONASE) 50 mcg/actuation nasal spray 1 spray by Each Nare route once daily.      fluvoxaMINE (LUVOX) 100 MG tablet TAKE 3 TABLETS BY MOUTH NIGHTLY. 270 tablet 1     No current facility-administered medications on file prior to visit.       ROS:    Constitution: Denies chills, fever, and sweats.  HENT: Denies headaches or blurry vision.  Cardiovascular: Denies chest pain or irregular heart beat.  Respiratory: Denies cough or shortness of breath.  Gastrointestinal: Denies abdominal pain, nausea, or vomiting.  Genitourinary:  Denies urinary incontinence, bladder and kidney issues  Musculoskeletal:  Denies muscle cramps.  Neurological: Denies dizziness or focal weakness.  Psychiatric/Behavioral: Normal mental status.  Hematologic/Lymphatic: Denies bleeding problem or easy bruising/bleeding.  Skin: Denies rash or suspicious lesions.    Physical examination     Gen - No acute distress, well nourished, well groomed   Eyes -  Extraoccular motions intact, pupils equally round and reactive to light and accommodation   ENT - normocephalic, atruamtic, oropharynx clear   Neck - Supple, no abnormal masses   Cardiovascular - regular rate and rhythm   Pulmonary - clear to auscultation bilaterally, no wheezes, ronchi, or rales   Abdomen - soft, non-tender, non-distended, positive bowel sounds   Psych - The patient is alert and oriented x3 with normal mood and affect    Left Upper Extremity Examination     Skin is intact throughout   Motor is intact distally radial, median, ulnar, AIN, PIN   +2 radial and ulnar pulses   Sensation to light touch is intact distally radial, median, and ulnar     Examination of the Left shoulder:     Passive ROM:   For - 150   Abd - 150   Ext - 50   Int - T12     Active ROM:   For - 50   Abd - 50   Ext - 20   Int - hip    If I passively forward flex or abduct the patient's arm he is able to hold it in that position after I release it without dropping down.  His active range of motion is limited secondary to pain.    Tenderness to palpation:   Subacromial space - negative  Biceps Tendon - negative  Anterior Glenohumeral Joint - positive  AC joint - negative  Glenohumeral instability - negative  Empty Can test - positive  Speeds test - positive  Hoyt/Neers sign - positive  Cross-arm adduction test - negative    X-ray images were examined and personally interpreted by me.  Three views left shoulder dated 10/25/2022 show the humeral head well reduced in the glenoid with no advanced arthritic changes and no acute fractures.    Dx:  Sprain of left shoulder rotator cuff    Plan:  I did offer the patient an injection and he agreed.  We injected the left shoulder with mixture of 2, 2, 1.  He tolerated that well.  I think he would benefit from physical therapy so we made the PT referral and he will follow up in 4 weeks for re-evaluation.

## 2022-10-25 NOTE — PROCEDURES
Large Joint Aspiration/Injection: L glenohumeral    Date/Time: 10/25/2022 2:30 PM  Performed by: Leobardo Tolbert II, MD  Authorized by: Leobardo Tolbert II, MD     Consent Done?:  Yes (Verbal)  Indications:  Pain  Timeout: prior to procedure the correct patient, procedure, and site was verified    Prep: patient was prepped and draped in usual sterile fashion      Local anesthesia used?: Yes    Local anesthetic:  Topical anesthetic    Details:  Needle Size:  22 G  Approach:  Posterior  Location:  Shoulder  Site:  L glenohumeral  Medications:  80 mg methylPREDNISolone acetate 80 mg/mL  Patient tolerance:  Patient tolerated the procedure well with no immediate complications

## 2022-10-26 PROBLEM — M25.512 ACUTE PAIN OF LEFT SHOULDER: Status: ACTIVE | Noted: 2022-10-26

## 2022-11-14 ENCOUNTER — OFFICE VISIT (OUTPATIENT)
Dept: SPINE | Facility: CLINIC | Age: 37
End: 2022-11-14
Payer: COMMERCIAL

## 2022-11-14 VITALS — HEIGHT: 74 IN | RESPIRATION RATE: 18 BRPM | BODY MASS INDEX: 29.52 KG/M2 | WEIGHT: 230 LBS

## 2022-11-14 DIAGNOSIS — G89.29 CHRONIC BILATERAL LOW BACK PAIN WITHOUT SCIATICA: Primary | ICD-10-CM

## 2022-11-14 DIAGNOSIS — M54.50 CHRONIC BILATERAL LOW BACK PAIN WITHOUT SCIATICA: Primary | ICD-10-CM

## 2022-11-14 DIAGNOSIS — M54.2 CERVICALGIA: ICD-10-CM

## 2022-11-14 DIAGNOSIS — M54.6 PAIN IN THORACIC SPINE: ICD-10-CM

## 2022-11-14 PROCEDURE — 1160F PR REVIEW ALL MEDS BY PRESCRIBER/CLIN PHARMACIST DOCUMENTED: ICD-10-PCS | Mod: CPTII,S$GLB,, | Performed by: PHYSICAL MEDICINE & REHABILITATION

## 2022-11-14 PROCEDURE — 99204 OFFICE O/P NEW MOD 45 MIN: CPT | Mod: S$GLB,,, | Performed by: PHYSICAL MEDICINE & REHABILITATION

## 2022-11-14 PROCEDURE — 3008F PR BODY MASS INDEX (BMI) DOCUMENTED: ICD-10-PCS | Mod: CPTII,S$GLB,, | Performed by: PHYSICAL MEDICINE & REHABILITATION

## 2022-11-14 PROCEDURE — 1159F MED LIST DOCD IN RCRD: CPT | Mod: CPTII,S$GLB,, | Performed by: PHYSICAL MEDICINE & REHABILITATION

## 2022-11-14 PROCEDURE — 1159F PR MEDICATION LIST DOCUMENTED IN MEDICAL RECORD: ICD-10-PCS | Mod: CPTII,S$GLB,, | Performed by: PHYSICAL MEDICINE & REHABILITATION

## 2022-11-14 PROCEDURE — 1160F RVW MEDS BY RX/DR IN RCRD: CPT | Mod: CPTII,S$GLB,, | Performed by: PHYSICAL MEDICINE & REHABILITATION

## 2022-11-14 PROCEDURE — 3008F BODY MASS INDEX DOCD: CPT | Mod: CPTII,S$GLB,, | Performed by: PHYSICAL MEDICINE & REHABILITATION

## 2022-11-14 PROCEDURE — 99204 PR OFFICE/OUTPT VISIT, NEW, LEVL IV, 45-59 MIN: ICD-10-PCS | Mod: S$GLB,,, | Performed by: PHYSICAL MEDICINE & REHABILITATION

## 2022-11-14 NOTE — PROGRESS NOTES
"  SUBJECTIVE:    Patient ID: Denny Atkinson is a 37 y.o. male.    Chief Complaint: Low-back Pain and Back Pain (upper back pain)    This is a 37-year-old man who has no primary care physician.  Denies any chronic major medical problems.  Long history of neck midthoracic and low back pain.  Never had any specific treatment for those issues.  Not in a great deal of pain just mostly stiff and sore mainly when he 1st gets out of bed.  He does some home exercises and takes Tylenol as needed.  Occasionally gets discomfort radiating into both arms to about the level of the elbow but nothing involving the hands.  He has no radicular leg symptoms.  Denies bowel or bladder dysfunction fever chills sweats or unexpected weight loss.  Pain level is 2/10.  I have no imaging to review        Past Medical History:   Diagnosis Date    Allergy     Anxiety      Social History     Socioeconomic History    Marital status:    Tobacco Use    Smoking status: Former     Types: Cigarettes     Quit date:      Years since quittin.8    Smokeless tobacco: Never   Substance and Sexual Activity    Alcohol use: No    Drug use: No    Sexual activity: Yes     Past Surgical History:   Procedure Laterality Date    FINGER SURGERY  2007    HERNIA REPAIR  1985     Family History   Problem Relation Age of Onset    Hypertension Father     Hyperlipidemia Father     Hypertension Brother     Hyperlipidemia Brother     Hypertension Paternal Grandmother     Cancer Paternal Grandfather     Hypertension Paternal Grandfather     Hyperlipidemia Paternal Grandfather      Vitals:    22 0836   Resp: 18   Weight: 104.3 kg (230 lb)   Height: 6' 2" (1.88 m)       Review of Systems   Constitutional:  Negative for chills, diaphoresis, fatigue, fever and unexpected weight change.   HENT:  Negative for trouble swallowing.    Eyes:  Negative for visual disturbance.   Respiratory:  Negative for shortness of breath.    Cardiovascular:  Negative for chest " pain.   Gastrointestinal:  Negative for abdominal pain, constipation, diarrhea, nausea and vomiting.   Genitourinary:  Negative for difficulty urinating.   Musculoskeletal:  Negative for arthralgias, back pain, gait problem, joint swelling, myalgias, neck pain and neck stiffness.   Neurological:  Negative for dizziness, speech difficulty, weakness, light-headedness, numbness and headaches.        Objective:      Physical Exam  Neurological:      Mental Status: He is alert and oriented to person, place, and time.      Comments: He is awake and in no acute distress  No point tenderness external lesions or palpable masses about the cervical spine  No point tenderness or palpable masses about the thoracic or lumbar spine  Forward flexion of the lumbar spine is normal and painless.  Extension causes no pain  He can heel and toe walk normally  Reflexes- +1-+2 reflexes at the following:   C5-Biceps   C6-Brachioradialis   C7-Triceps   L3/4-Patellar   S1-Achilles   Chasity sign negative bilaterally  Strength testing- 5/5 strength in the following muscle groups:  C5-Elbow flexion  C6-Wrist extension  C7-Elbow extension  C8-Finger flexion  T1-Finger abduction  L2-Hip flexion  L3-Knee extension  L4-Ankle dorsiflexion  L5-Great toe extension  S1-Ankle plantar flexion       Straight leg raise negative bilaterally  EMERALD test negative bilaterally           Assessment:       1. Chronic bilateral low back pain without sciatica    2. Cervicalgia    3. Pain in thoracic spine             Plan:     He has a nonfocal neurological examination and no historical red flags.  I suspect has neck midthoracic and low back pain on basis of degenerative disc disease and facet arthropathy.  Possible radicular component to the upper arm discomfort.  No evidence of nerve root dysfunction.  I recommend physical therapy.  Will get some baseline x-rays of the neck thoracic and lumbar spine.  Follow-up with me at the completion of therapy.  Consider MRI  and subsequent epidural steroid injection versus radiofrequency ablation      Chronic bilateral low back pain without sciatica  -     Ambulatory referral/consult to [facility]; Future; Expected date: 11/21/2022  -     X-Ray Lumbar Complete Including Flex And Ext; Future; Expected date: 11/14/2022    Cervicalgia  -     Ambulatory referral/consult to [facility]; Future; Expected date: 11/21/2022  -     X-Ray Cervical Spine AP Lat with Flex Ex; Future; Expected date: 11/14/2022    Pain in thoracic spine  -     X-Ray Thoracic Spine AP Lateral; Future; Expected date: 11/14/2022

## 2022-11-14 NOTE — PATIENT INSTRUCTIONS
Contact Dr. Isbell's office via [facility] message or at [phone] when you are finished with the [facility] to schedule follow up.

## 2022-11-16 ENCOUNTER — OFFICE VISIT (OUTPATIENT)
Dept: PSYCHIATRY | Facility: CLINIC | Age: 37
End: 2022-11-16
Payer: COMMERCIAL

## 2022-11-16 VITALS
BODY MASS INDEX: 29.54 KG/M2 | HEART RATE: 68 BPM | WEIGHT: 230.19 LBS | SYSTOLIC BLOOD PRESSURE: 136 MMHG | HEIGHT: 74 IN | DIASTOLIC BLOOD PRESSURE: 92 MMHG

## 2022-11-16 DIAGNOSIS — F42.9 OBSESSIVE-COMPULSIVE DISORDER, UNSPECIFIED TYPE: Primary | ICD-10-CM

## 2022-11-16 DIAGNOSIS — F41.1 GAD (GENERALIZED ANXIETY DISORDER): ICD-10-CM

## 2022-11-16 PROCEDURE — 90833 PSYTX W PT W E/M 30 MIN: CPT | Mod: S$GLB,,, | Performed by: PHYSICIAN ASSISTANT

## 2022-11-16 PROCEDURE — 1159F MED LIST DOCD IN RCRD: CPT | Mod: CPTII,S$GLB,, | Performed by: PHYSICIAN ASSISTANT

## 2022-11-16 PROCEDURE — 3080F DIAST BP >= 90 MM HG: CPT | Mod: CPTII,S$GLB,, | Performed by: PHYSICIAN ASSISTANT

## 2022-11-16 PROCEDURE — 99214 OFFICE O/P EST MOD 30 MIN: CPT | Mod: S$GLB,,, | Performed by: PHYSICIAN ASSISTANT

## 2022-11-16 PROCEDURE — 3075F PR MOST RECENT SYSTOLIC BLOOD PRESS GE 130-139MM HG: ICD-10-PCS | Mod: CPTII,S$GLB,, | Performed by: PHYSICIAN ASSISTANT

## 2022-11-16 PROCEDURE — 99999 PR PBB SHADOW E&M-EST. PATIENT-LVL III: ICD-10-PCS | Mod: PBBFAC,,, | Performed by: PHYSICIAN ASSISTANT

## 2022-11-16 PROCEDURE — 1160F PR REVIEW ALL MEDS BY PRESCRIBER/CLIN PHARMACIST DOCUMENTED: ICD-10-PCS | Mod: CPTII,S$GLB,, | Performed by: PHYSICIAN ASSISTANT

## 2022-11-16 PROCEDURE — 1160F RVW MEDS BY RX/DR IN RCRD: CPT | Mod: CPTII,S$GLB,, | Performed by: PHYSICIAN ASSISTANT

## 2022-11-16 PROCEDURE — 3080F PR MOST RECENT DIASTOLIC BLOOD PRESSURE >= 90 MM HG: ICD-10-PCS | Mod: CPTII,S$GLB,, | Performed by: PHYSICIAN ASSISTANT

## 2022-11-16 PROCEDURE — 99999 PR PBB SHADOW E&M-EST. PATIENT-LVL III: CPT | Mod: PBBFAC,,, | Performed by: PHYSICIAN ASSISTANT

## 2022-11-16 PROCEDURE — 90833 PR PSYCHOTHERAPY W/PATIENT W/E&M, 30 MIN (ADD ON): ICD-10-PCS | Mod: S$GLB,,, | Performed by: PHYSICIAN ASSISTANT

## 2022-11-16 PROCEDURE — 3075F SYST BP GE 130 - 139MM HG: CPT | Mod: CPTII,S$GLB,, | Performed by: PHYSICIAN ASSISTANT

## 2022-11-16 PROCEDURE — 3008F BODY MASS INDEX DOCD: CPT | Mod: CPTII,S$GLB,, | Performed by: PHYSICIAN ASSISTANT

## 2022-11-16 PROCEDURE — 3008F PR BODY MASS INDEX (BMI) DOCUMENTED: ICD-10-PCS | Mod: CPTII,S$GLB,, | Performed by: PHYSICIAN ASSISTANT

## 2022-11-16 PROCEDURE — 1159F PR MEDICATION LIST DOCUMENTED IN MEDICAL RECORD: ICD-10-PCS | Mod: CPTII,S$GLB,, | Performed by: PHYSICIAN ASSISTANT

## 2022-11-16 PROCEDURE — 99214 PR OFFICE/OUTPT VISIT, EST, LEVL IV, 30-39 MIN: ICD-10-PCS | Mod: S$GLB,,, | Performed by: PHYSICIAN ASSISTANT

## 2022-11-16 RX ORDER — FLUVOXAMINE MALEATE 100 MG/1
TABLET, COATED ORAL
Qty: 270 TABLET | Refills: 1 | Status: SHIPPED | OUTPATIENT
Start: 2022-11-16 | End: 2022-12-27 | Stop reason: SDUPTHER

## 2022-11-16 NOTE — PROGRESS NOTES
Outpatient Psychiatry Follow-Up Visit (MD/NP)    11/16/2022    Clinical Status of Patient:  Outpatient (Ambulatory)    Chief Complaint:  Denny Atkinson is a 37 y.o. male who presents today for follow-up of anxiety.  Met with patient.      Interval History and Content of Current Session:  Interim Events/Subjective Report/Content of Current Session:   Denny is seen today for medication follow-up.  This is our regularly scheduled three-month follow-up.  Reports that he is doing well with OCD symptoms.  He does endorse more of a background level of anxiety.  States he sometimes feels anxiety in the pit of his stomach.  Does have a busy work schedule.  Overall states his life is very busy between work and family.  His wife also has a very similar chaotic schedule as they are both professors.  He states he constantly feels like he is catching up but he is able to complete all of his tasks.  He states that this really has not been a lifelong situation but he does consider it to be a stage in his life as being a parent as well as a staging his career.  Does feel like he is overall managing.  We discussed medication adjunct including an atypical antipsychotic which she is just not sure if the side effect profile is worth it.  Does have upcoming plans for the holidays.  He would like this writer to see his wife for anxiety as well and we discussed that we would make an exception as they do not seem to have marital conflicts.  He denies suicidal or homicidal ideation.  No other complaints today.    FROM PREVIOUS HPI  Denny is seen today for medication follow-up.  He reports that he is doing well at this time.  Utilizing over-the-counter  mg twice daily he is tolerating without issue but is unsure benefit.  Plan will be to increase to 1200 mg twice daily in the coming month.  Still tolerating Luvox well.  He does feel that OCD symptoms have improved.  Discussed goal for improvement in not complete resolution.  He is  understanding of this.  Discussed utilizing naltrexone or aripiprazole in the future.  Discussed functionality versus nonfunctioning and this will be our baseline.  He has lost 5 lb since he went on vacation recently.  He denies suicidal or homicidal ideation.  No other complaints today. He will not be able to see Viv much at all due lack of coverage for therapy visits.     GAD7 11/16/2022 8/25/2022 7/25/2022   1. Feeling nervous, anxious, or on edge? 2 1 1   2. Not being able to stop or control worrying? 1 1 1   3. Worrying too much about different things? 1 1 1   4. Trouble relaxing? 1 1 1   5. Being so restless that it is hard to sit still? 1 0 1   6. Becoming easily annoyed or irritable? 1 1 1   7. Feeling afraid as if something awful might happen? 0 0 0   8. If you checked off any problems, how difficult have these problems made it for you to do your work, take care of things at home, or get along with other people? 0 1 1   MAHESH-7 Score 7 5 6     PHQ9 11/16/2022   Little interest or pleasure in doing things: Not at all   Feeling down, depressed or hopeless: Not at all   Trouble falling asleep, staying asleep, or sleeping too much: Several days   Feeling tired or having little energy: Several days   Poor appetite or overeating: Several days   Feeling bad about yourself- or that you are a failure or have let yourself or family down Not at all   Trouble concentrating on things, such as reading the newspaper or watching television: Several days   Moving or speaking so slowly that other people could have noticed. Or the opposite- being so fidgety or restless that you have been moving around a lot more than usual: Several days   Thoughts that you would be better off dead or hurting yourself in some way: Not at all   If you indicated you have experienced any of the aforementioned problems, how difficult have these problems made it for you to do your work, take care of things at home or get along with other people? Not  difficult at all   Total Score 5     Psychotherapy:  Target symptoms: anxiety , adjustment, work stress, OCD  Why chosen therapy is appropriate versus another modality: relevant to diagnosis  Outcome monitoring methods: self-report, observation, checklist/rating scale  Therapeutic intervention type: supportive psychotherapy  Topics discussed/themes: work stress, building skills sets for symptom management, symptom recognition, life stage transitional issues  The patient's response to the intervention is accepting. The patient's progress toward treatment goals is good.   Duration of intervention: 16 minutes.    Review of Systems   PSYCHIATRIC: Pertinant items are noted in the narrative.  RESPIRATORY: No shortness of breath.  CARDIOVASCULAR: No tachycardia or chest pain.  GASTROINTESTINAL: No nausea, vomiting, pain, constipation or diarrhea.    Past Medical, Family and Social History: The patient's past medical, family and social history have been reviewed and updated as appropriate within the electronic medical record - see encounter notes.    Compliance: yes    Side effects: None    Risk Parameters:  Patient reports no suicidal ideation  Patient reports no homicidal ideation  Patient reports no self-injurious behavior  Patient reports no violent behavior    Exam (detailed: at least 9 elements; comprehensive: all 15 elements)   Constitutional  Vitals:  Most recent vital signs, dated less than 90 days prior to this appointment, were reviewed.   Vitals:    11/16/22 0921   BP: (!) 136/92   Pulse: 68            General:  unremarkable, age appropriate     Musculoskeletal  Muscle Strength/Tone:  no dyskinesia   Gait & Station:  non-ataxic     Psychiatric  Speech:  no latency; no press   Mood & Affect:  good  congruent and appropriate   Thought Process:  normal and logical   Associations:  intact   Thought Content:  normal, no suicidality, no homicidality, delusions, or paranoia   Insight:  intact   Judgement: behavior is  adequate to circumstances   Orientation:  grossly intact   Memory: intact for content of interview   Language: grossly intact   Attention Span & Concentration:  able to focus   Fund of Knowledge:  intact and appropriate to age and level of education     Assessment and Diagnosis   Status/Progress: Based on the examination today, the patient's problem(s) is/are adequately but not ideally controlled.  New problems have not been presented today.   Co-morbidities, Diagnostic uncertainty and Lack of compliance are not complicating management of the primary condition.      General Impression:   OCD with good insight  MAHESH    Intervention/Counseling/Treatment Plan   Medication Management: Continue current medications. The risks and benefits of medication were discussed with the patient.  Counseling provided with patient as follows: importance of compliance with chosen treatment options was emphasized, risks and benefits of treatment options, including medications, were discussed with the patient, risk factor reduction, prognosis    Continue Luvox 100mg daily and 200mg nightly for OCD.  Consider antipsychotic augmentation.  Continue with Viv, LCSW.   Continue NAC 1200mg twice daily.     Please go to emergency department if feeling as though you are a harm to yourself or others or if you are in crisis. Please call the clinic to report any worsening of symptoms or problems associated with medication.    Discussed with patient informed consent, risks vs. benefits, alternative treatments, side effect profile and the inherent unpredictability of individual responses to these treatments. The patient expresses understanding of the above and displays the capacity to agree with this current plan and had no other questions.      Return to Clinic: 3 months, as needed

## 2022-12-25 ENCOUNTER — PATIENT MESSAGE (OUTPATIENT)
Dept: PSYCHIATRY | Facility: CLINIC | Age: 37
End: 2022-12-25
Payer: COMMERCIAL

## 2022-12-27 ENCOUNTER — PATIENT MESSAGE (OUTPATIENT)
Dept: PSYCHIATRY | Facility: CLINIC | Age: 37
End: 2022-12-27
Payer: COMMERCIAL

## 2022-12-27 ENCOUNTER — OFFICE VISIT (OUTPATIENT)
Dept: PSYCHIATRY | Facility: CLINIC | Age: 37
End: 2022-12-27
Payer: COMMERCIAL

## 2022-12-27 DIAGNOSIS — F42.9 OBSESSIVE-COMPULSIVE DISORDER, UNSPECIFIED TYPE: ICD-10-CM

## 2022-12-27 PROCEDURE — 99499 UNLISTED E&M SERVICE: CPT | Mod: 95,,, | Performed by: PHYSICIAN ASSISTANT

## 2022-12-27 PROCEDURE — 99499 NO LOS: ICD-10-PCS | Mod: 95,,, | Performed by: PHYSICIAN ASSISTANT

## 2022-12-27 RX ORDER — FLUVOXAMINE MALEATE 100 MG/1
TABLET, COATED ORAL
Start: 2022-12-27 | End: 2023-02-16 | Stop reason: SDUPTHER

## 2022-12-27 NOTE — PROGRESS NOTES
Patient is in the state of [place]. OCD has improved but feeling very tired in the morning. Would like to reduce Luvox to 200mg nightly. Adjusted medication in his chart. Follow up is scheduled.

## 2023-02-16 ENCOUNTER — OFFICE VISIT (OUTPATIENT)
Dept: PSYCHIATRY | Facility: CLINIC | Age: 38
End: 2023-02-16
Payer: COMMERCIAL

## 2023-02-16 VITALS
HEART RATE: 63 BPM | HEIGHT: 74 IN | WEIGHT: 239.06 LBS | BODY MASS INDEX: 30.68 KG/M2 | SYSTOLIC BLOOD PRESSURE: 132 MMHG | DIASTOLIC BLOOD PRESSURE: 85 MMHG

## 2023-02-16 DIAGNOSIS — F41.1 GAD (GENERALIZED ANXIETY DISORDER): ICD-10-CM

## 2023-02-16 DIAGNOSIS — F42.9 OBSESSIVE-COMPULSIVE DISORDER, UNSPECIFIED TYPE: Primary | ICD-10-CM

## 2023-02-16 PROCEDURE — 1160F RVW MEDS BY RX/DR IN RCRD: CPT | Mod: CPTII,S$GLB,, | Performed by: PHYSICIAN ASSISTANT

## 2023-02-16 PROCEDURE — 1160F PR REVIEW ALL MEDS BY PRESCRIBER/CLIN PHARMACIST DOCUMENTED: ICD-10-PCS | Mod: CPTII,S$GLB,, | Performed by: PHYSICIAN ASSISTANT

## 2023-02-16 PROCEDURE — 3079F PR MOST RECENT DIASTOLIC BLOOD PRESSURE 80-89 MM HG: ICD-10-PCS | Mod: CPTII,S$GLB,, | Performed by: PHYSICIAN ASSISTANT

## 2023-02-16 PROCEDURE — 99999 PR PBB SHADOW E&M-EST. PATIENT-LVL III: ICD-10-PCS | Mod: PBBFAC,,, | Performed by: PHYSICIAN ASSISTANT

## 2023-02-16 PROCEDURE — 3075F SYST BP GE 130 - 139MM HG: CPT | Mod: CPTII,S$GLB,, | Performed by: PHYSICIAN ASSISTANT

## 2023-02-16 PROCEDURE — 99999 PR PBB SHADOW E&M-EST. PATIENT-LVL III: CPT | Mod: PBBFAC,,, | Performed by: PHYSICIAN ASSISTANT

## 2023-02-16 PROCEDURE — 1159F MED LIST DOCD IN RCRD: CPT | Mod: CPTII,S$GLB,, | Performed by: PHYSICIAN ASSISTANT

## 2023-02-16 PROCEDURE — 99214 PR OFFICE/OUTPT VISIT, EST, LEVL IV, 30-39 MIN: ICD-10-PCS | Mod: S$GLB,,, | Performed by: PHYSICIAN ASSISTANT

## 2023-02-16 PROCEDURE — 1159F PR MEDICATION LIST DOCUMENTED IN MEDICAL RECORD: ICD-10-PCS | Mod: CPTII,S$GLB,, | Performed by: PHYSICIAN ASSISTANT

## 2023-02-16 PROCEDURE — 3008F PR BODY MASS INDEX (BMI) DOCUMENTED: ICD-10-PCS | Mod: CPTII,S$GLB,, | Performed by: PHYSICIAN ASSISTANT

## 2023-02-16 PROCEDURE — 3079F DIAST BP 80-89 MM HG: CPT | Mod: CPTII,S$GLB,, | Performed by: PHYSICIAN ASSISTANT

## 2023-02-16 PROCEDURE — 99214 OFFICE O/P EST MOD 30 MIN: CPT | Mod: S$GLB,,, | Performed by: PHYSICIAN ASSISTANT

## 2023-02-16 PROCEDURE — 3075F PR MOST RECENT SYSTOLIC BLOOD PRESS GE 130-139MM HG: ICD-10-PCS | Mod: CPTII,S$GLB,, | Performed by: PHYSICIAN ASSISTANT

## 2023-02-16 PROCEDURE — 3008F BODY MASS INDEX DOCD: CPT | Mod: CPTII,S$GLB,, | Performed by: PHYSICIAN ASSISTANT

## 2023-02-16 RX ORDER — MULTIVITAMIN
1 TABLET ORAL DAILY
COMMUNITY

## 2023-02-16 RX ORDER — FLUVOXAMINE MALEATE 100 MG/1
TABLET, COATED ORAL
Qty: 90 TABLET | Refills: 1 | Status: SHIPPED | OUTPATIENT
Start: 2023-02-16 | End: 2023-04-27

## 2023-02-16 NOTE — PROGRESS NOTES
Outpatient Psychiatry Follow-Up Visit (MD/NP)    2/16/2023    Clinical Status of Patient:  Outpatient (Ambulatory)    Chief Complaint:  Denny Atkinson is a 38 y.o. male who presents today for follow-up of anxiety.  Met with patient.      Interval History and Content of Current Session:  Interim Events/Subjective Report/Content of Current Session:   Denny is seen today for medication follow-up.  Patient reports that he is doing well in regards to obsessive-compulsive symptoms but now feels that generalized anxiety is more bothersome.  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  He states he has been doing okay with Luvox 200 mg nightly and we discussed some adjunctive medications and ultimately has decided to trial  mg of Luvox during the day to see if this does assist with anxiety.  He has completely stopped chewing nicotine gum and is drinking less caffeine.  He reports that his sleep habits have improved.  He does discuss stressful animal situation and that he does have two elderly dogs who are quite a bit of work to take care of.  There also is an outdoor cat that is wanting more attention and is impacting the dogs routine as well.  Was previously on buspirone but did not do well, states that he had some issues with addiction of benzodiazepines.  He would like to trial of Luvox adjustment and then we can go from there.  He denies suicidal or homicidal ideation.  No other complaints today.      Review of Systems   PSYCHIATRIC: Pertinant items are noted in the narrative.  RESPIRATORY: No shortness of breath.  CARDIOVASCULAR: No tachycardia or chest pain.  GASTROINTESTINAL: No nausea, vomiting, pain, constipation or diarrhea.    Past Medical, Family and Social History: The patient's past medical, family and social history have been reviewed and updated as appropriate within the electronic medical record - see encounter  "notes.    Compliance: yes    Side effects: None    Risk Parameters:  Patient reports no suicidal ideation  Patient reports no homicidal ideation  Patient reports no self-injurious behavior  Patient reports no violent behavior    Exam (detailed: at least 9 elements; comprehensive: all 15 elements)   Constitutional  Vitals:  Most recent vital signs, dated less than 90 days prior to this appointment, were reviewed.   Vitals:    02/16/23 0808   BP: 132/85   Pulse: 63   Weight: 108.4 kg (239 lb 1.4 oz)   Height: 6' 2" (1.88 m)        General:  unremarkable, age appropriate     Musculoskeletal  Muscle Strength/Tone:  no dyskinesia   Gait & Station:  non-ataxic     Psychiatric  Speech:  no latency; no press   Mood & Affect:  Pretty good  congruent and appropriate   Thought Process:  normal and logical   Associations:  intact   Thought Content:  normal, no suicidality, no homicidality, delusions, or paranoia   Insight:  intact   Judgement: behavior is adequate to circumstances   Orientation:  grossly intact   Memory: intact for content of interview   Language: grossly intact   Attention Span & Concentration:  able to focus   Fund of Knowledge:  intact and appropriate to age and level of education     Assessment and Diagnosis   Status/Progress: Based on the examination today, the patient's problem(s) is/are adequately but not ideally controlled.  New problems have not been presented today.   Co-morbidities, Diagnostic uncertainty, and Lack of compliance are not complicating management of the primary condition.      General Impression:   OCD with good insight  MAHESH       Intervention/Counseling/Treatment Plan   Medication Management: The risks and benefits of medication were discussed with the patient.  Counseling provided with patient as follows: importance of compliance with chosen treatment options was emphasized, risks and benefits of treatment options, including medications, were discussed with the patient, risk factor " reduction, prognosis    Denny is seen today for medication follow-up.  Patient reports that he has had significant improvement in obsessive-compulsive symptoms with Luvox but now generalized anxiety is more bothersome.  Based on assessment:    Trial Luvox  mg daily for anxiety.    Continue Luvox 100 mg nightly for anxiety/OCD symptoms.    Please go to emergency department if feeling as though you are a harm to yourself or others or if you are in crisis. Please call the clinic to report any worsening of symptoms or problems associated with medication.    Discussed with patient informed consent, risks vs. benefits, alternative treatments, side effect profile and the inherent unpredictability of individual responses to these treatments. The patient expresses understanding of the above and displays the capacity to agree with this current plan and had no other questions.      Return to Clinic: 2 months, as needed

## 2023-02-16 NOTE — PATIENT INSTRUCTIONS
Please go to emergency department if feeling as though you are a harm to yourself or others or if you are in crisis. Please call the clinic to report any worsening of symptoms or problems associated with medication.    Trial 50-100mg of luvox in the morning for anxiety.    Please go to emergency department if feeling as though you are a harm to yourself or others or if you are in crisis. Please call the clinic to report any worsening of symptoms or problems associated with medication.

## 2023-03-21 ENCOUNTER — OFFICE VISIT (OUTPATIENT)
Dept: FAMILY MEDICINE | Facility: CLINIC | Age: 38
End: 2023-03-21
Payer: COMMERCIAL

## 2023-03-21 VITALS
TEMPERATURE: 98 F | DIASTOLIC BLOOD PRESSURE: 70 MMHG | WEIGHT: 234.81 LBS | OXYGEN SATURATION: 95 % | HEIGHT: 74 IN | SYSTOLIC BLOOD PRESSURE: 120 MMHG | RESPIRATION RATE: 16 BRPM | BODY MASS INDEX: 30.14 KG/M2 | HEART RATE: 68 BPM

## 2023-03-21 DIAGNOSIS — H81.10 BENIGN PAROXYSMAL POSITIONAL VERTIGO, UNSPECIFIED LATERALITY: Primary | ICD-10-CM

## 2023-03-21 PROCEDURE — 3078F DIAST BP <80 MM HG: CPT | Mod: CPTII,S$GLB,, | Performed by: FAMILY MEDICINE

## 2023-03-21 PROCEDURE — 99999 PR PBB SHADOW E&M-EST. PATIENT-LVL IV: ICD-10-PCS | Mod: PBBFAC,,, | Performed by: FAMILY MEDICINE

## 2023-03-21 PROCEDURE — 1159F PR MEDICATION LIST DOCUMENTED IN MEDICAL RECORD: ICD-10-PCS | Mod: CPTII,S$GLB,, | Performed by: FAMILY MEDICINE

## 2023-03-21 PROCEDURE — 99213 OFFICE O/P EST LOW 20 MIN: CPT | Mod: S$GLB,,, | Performed by: FAMILY MEDICINE

## 2023-03-21 PROCEDURE — 99999 PR PBB SHADOW E&M-EST. PATIENT-LVL IV: CPT | Mod: PBBFAC,,, | Performed by: FAMILY MEDICINE

## 2023-03-21 PROCEDURE — 3078F PR MOST RECENT DIASTOLIC BLOOD PRESSURE < 80 MM HG: ICD-10-PCS | Mod: CPTII,S$GLB,, | Performed by: FAMILY MEDICINE

## 2023-03-21 PROCEDURE — 3008F BODY MASS INDEX DOCD: CPT | Mod: CPTII,S$GLB,, | Performed by: FAMILY MEDICINE

## 2023-03-21 PROCEDURE — 3074F SYST BP LT 130 MM HG: CPT | Mod: CPTII,S$GLB,, | Performed by: FAMILY MEDICINE

## 2023-03-21 PROCEDURE — 1160F PR REVIEW ALL MEDS BY PRESCRIBER/CLIN PHARMACIST DOCUMENTED: ICD-10-PCS | Mod: CPTII,S$GLB,, | Performed by: FAMILY MEDICINE

## 2023-03-21 PROCEDURE — 3074F PR MOST RECENT SYSTOLIC BLOOD PRESSURE < 130 MM HG: ICD-10-PCS | Mod: CPTII,S$GLB,, | Performed by: FAMILY MEDICINE

## 2023-03-21 PROCEDURE — 1160F RVW MEDS BY RX/DR IN RCRD: CPT | Mod: CPTII,S$GLB,, | Performed by: FAMILY MEDICINE

## 2023-03-21 PROCEDURE — 1159F MED LIST DOCD IN RCRD: CPT | Mod: CPTII,S$GLB,, | Performed by: FAMILY MEDICINE

## 2023-03-21 PROCEDURE — 3008F PR BODY MASS INDEX (BMI) DOCUMENTED: ICD-10-PCS | Mod: CPTII,S$GLB,, | Performed by: FAMILY MEDICINE

## 2023-03-21 PROCEDURE — 99213 PR OFFICE/OUTPT VISIT, EST, LEVL III, 20-29 MIN: ICD-10-PCS | Mod: S$GLB,,, | Performed by: FAMILY MEDICINE

## 2023-03-21 RX ORDER — MECLIZINE HCL 12.5 MG 12.5 MG/1
12.5 TABLET ORAL 3 TIMES DAILY PRN
Qty: 30 TABLET | Refills: 0 | Status: SHIPPED | OUTPATIENT
Start: 2023-03-21 | End: 2023-04-02 | Stop reason: SDUPTHER

## 2023-03-21 NOTE — PROGRESS NOTES
Subjective:       Patient ID: Denny Atkinson is a 38 y.o. male.    Chief Complaint: No chief complaint on file.    New to me patient here for UC visit.  CC- 3-4 days of vertigo type dizziness; noted more when in vehicle or turns head to either side.  No URI symptoms, HA's preceding; fever, trauma, nack pain or any focal vision or Neuro symptoms.  Some nausea at time w dizziness.      Needs new PCP - last labs showed high, mixed HLD.    Review of Systems   Constitutional:  Negative for fever.   HENT:  Negative for congestion, ear pain, sinus pressure and tinnitus.    Respiratory:  Negative for shortness of breath.    Cardiovascular:  Negative for chest pain.   Gastrointestinal:  Negative for abdominal pain and nausea.   Skin:  Negative for rash.   Neurological:  Positive for dizziness. Negative for numbness.   All other systems reviewed and are negative.    Objective:      Physical Exam  Vitals reviewed.   Constitutional:       General: He is not in acute distress.     Appearance: He is well-developed.   HENT:      Mouth/Throat:      Mouth: Mucous membranes are moist.      Pharynx: No posterior oropharyngeal erythema.   Eyes:      Extraocular Movements: Extraocular movements intact.      Pupils: Pupils are equal, round, and reactive to light.   Cardiovascular:      Rate and Rhythm: Normal rate and regular rhythm.      Heart sounds: No murmur heard.  Pulmonary:      Effort: Pulmonary effort is normal.      Breath sounds: Normal breath sounds.   Musculoskeletal:      Cervical back: Neck supple.   Lymphadenopathy:      Cervical: No cervical adenopathy.   Skin:     General: Skin is warm and dry.   Neurological:      General: No focal deficit present.      Mental Status: He is alert.      Cranial Nerves: Cranial nerves 2-12 are intact.      Sensory: Sensation is intact.      Motor: Motor function is intact.      Coordination: Romberg sign negative.      Gait: Gait normal.      Deep Tendon Reflexes:      Reflex Scores:        Patellar reflexes are 2+ on the right side and 2+ on the left side.      Assessment:       1. Benign paroxysmal positional vertigo, unspecified laterality          Plan:       Benign paroxysmal positional vertigo, unspecified laterality  -     meclizine (ANTIVERT) 12.5 mg tablet; Take 1 tablet (12.5 mg total) by mouth 3 (three) times daily as needed for Dizziness or Nausea.  Dispense: 30 tablet; Refill: 0      Patient Instructions   Fish/Krill Oil - start with 2 capsules a day (1,000 mg but can vary some)    Avoid processed foods/snacks.       When you get your next appt, contact that provider to get labs ordered for prior to the office visit.

## 2023-03-21 NOTE — PATIENT INSTRUCTIONS
Fish/Krill Oil - start with 2 capsules a day (1,000 mg but can vary some)    Avoid processed foods/snacks.       When you get your next appt, contact that provider to get labs ordered for prior to the office visit.

## 2023-04-27 DIAGNOSIS — F42.9 OBSESSIVE-COMPULSIVE DISORDER, UNSPECIFIED TYPE: ICD-10-CM

## 2023-04-27 RX ORDER — FLUVOXAMINE MALEATE 100 MG/1
TABLET, COATED ORAL
Qty: 90 TABLET | Refills: 1 | Status: SHIPPED | OUTPATIENT
Start: 2023-04-27 | End: 2023-05-26 | Stop reason: SDUPTHER

## 2023-05-03 ENCOUNTER — OFFICE VISIT (OUTPATIENT)
Dept: FAMILY MEDICINE | Facility: CLINIC | Age: 38
End: 2023-05-03
Payer: COMMERCIAL

## 2023-05-03 VITALS
TEMPERATURE: 98 F | DIASTOLIC BLOOD PRESSURE: 74 MMHG | BODY MASS INDEX: 30.27 KG/M2 | WEIGHT: 235.88 LBS | RESPIRATION RATE: 18 BRPM | OXYGEN SATURATION: 98 % | HEART RATE: 68 BPM | SYSTOLIC BLOOD PRESSURE: 114 MMHG | HEIGHT: 74 IN

## 2023-05-03 DIAGNOSIS — Z00.00 ENCOUNTER FOR PREVENTIVE HEALTH EXAMINATION: Primary | ICD-10-CM

## 2023-05-03 DIAGNOSIS — E78.2 MIXED HYPERLIPIDEMIA: ICD-10-CM

## 2023-05-03 DIAGNOSIS — F41.1 GAD (GENERALIZED ANXIETY DISORDER): ICD-10-CM

## 2023-05-03 PROCEDURE — 99999 PR PBB SHADOW E&M-EST. PATIENT-LVL IV: ICD-10-PCS | Mod: PBBFAC,,, | Performed by: STUDENT IN AN ORGANIZED HEALTH CARE EDUCATION/TRAINING PROGRAM

## 2023-05-03 PROCEDURE — 99395 PR PREVENTIVE VISIT,EST,18-39: ICD-10-PCS | Mod: S$GLB,,, | Performed by: STUDENT IN AN ORGANIZED HEALTH CARE EDUCATION/TRAINING PROGRAM

## 2023-05-03 PROCEDURE — 3008F BODY MASS INDEX DOCD: CPT | Mod: CPTII,S$GLB,, | Performed by: STUDENT IN AN ORGANIZED HEALTH CARE EDUCATION/TRAINING PROGRAM

## 2023-05-03 PROCEDURE — 3074F SYST BP LT 130 MM HG: CPT | Mod: CPTII,S$GLB,, | Performed by: STUDENT IN AN ORGANIZED HEALTH CARE EDUCATION/TRAINING PROGRAM

## 2023-05-03 PROCEDURE — 3078F PR MOST RECENT DIASTOLIC BLOOD PRESSURE < 80 MM HG: ICD-10-PCS | Mod: CPTII,S$GLB,, | Performed by: STUDENT IN AN ORGANIZED HEALTH CARE EDUCATION/TRAINING PROGRAM

## 2023-05-03 PROCEDURE — 3008F PR BODY MASS INDEX (BMI) DOCUMENTED: ICD-10-PCS | Mod: CPTII,S$GLB,, | Performed by: STUDENT IN AN ORGANIZED HEALTH CARE EDUCATION/TRAINING PROGRAM

## 2023-05-03 PROCEDURE — 1159F MED LIST DOCD IN RCRD: CPT | Mod: CPTII,S$GLB,, | Performed by: STUDENT IN AN ORGANIZED HEALTH CARE EDUCATION/TRAINING PROGRAM

## 2023-05-03 PROCEDURE — 1159F PR MEDICATION LIST DOCUMENTED IN MEDICAL RECORD: ICD-10-PCS | Mod: CPTII,S$GLB,, | Performed by: STUDENT IN AN ORGANIZED HEALTH CARE EDUCATION/TRAINING PROGRAM

## 2023-05-03 PROCEDURE — 99999 PR PBB SHADOW E&M-EST. PATIENT-LVL IV: CPT | Mod: PBBFAC,,, | Performed by: STUDENT IN AN ORGANIZED HEALTH CARE EDUCATION/TRAINING PROGRAM

## 2023-05-03 PROCEDURE — 3074F PR MOST RECENT SYSTOLIC BLOOD PRESSURE < 130 MM HG: ICD-10-PCS | Mod: CPTII,S$GLB,, | Performed by: STUDENT IN AN ORGANIZED HEALTH CARE EDUCATION/TRAINING PROGRAM

## 2023-05-03 PROCEDURE — 3078F DIAST BP <80 MM HG: CPT | Mod: CPTII,S$GLB,, | Performed by: STUDENT IN AN ORGANIZED HEALTH CARE EDUCATION/TRAINING PROGRAM

## 2023-05-03 PROCEDURE — 99395 PREV VISIT EST AGE 18-39: CPT | Mod: S$GLB,,, | Performed by: STUDENT IN AN ORGANIZED HEALTH CARE EDUCATION/TRAINING PROGRAM

## 2023-05-03 NOTE — PROGRESS NOTES
[facility] Note    Subjective:    The HPI and pertinent ROS is included in the Diagnostic Impression Remarks section at the end of the note. Please see below for further details. Chief complaint is at end of note.     Denny is a pleasant intelligent patient who is here for evaluation.     Modified Medications    No medications on file       Data reviewed 274}  Previous medical records reviewed and summarized in plan section at end of note.      If you are due for any health screening(s) below please notify me so we can arrange them to be ordered and scheduled. Most healthy patients at your age complete them, but you are free to accept or refuse. If you can't do it, I'll definitely understand. If you can, I'd certainly appreciate it!     All of your core healthy metrics are met.      The following portions of the patient's history were reviewed and updated as appropriate: allergies, current medications, past family history, past medical history, past social history, past surgical history and problem list.    He  has a past medical history of Allergy and Anxiety.  He  has a past surgical history that includes Finger surgery (2007) and Hernia repair (1985).    He  reports that he quit smoking about 19 years ago. His smoking use included cigarettes. He has been exposed to tobacco smoke. He has never used smokeless tobacco. He reports that he does not drink alcohol and does not use drugs.  He family history includes Cancer in his paternal grandfather; Hyperlipidemia in his brother, father, and paternal grandfather; Hypertension in his brother, father, paternal grandfather, and paternal grandmother.    Review of patient's allergies indicates:   Allergen Reactions    [facility] house dust        Tobacco Use: Medium Risk    Smoking Tobacco Use: Former    Smokeless Tobacco Use: Never    Passive Exposure: Past     Physical Examination  General appearance: alert, cooperative, no distress  Neck: no thyromegaly,  "no neck stiffness  Lungs: clear to auscultation, no wheezes, rales or rhonchi, symmetric air entry  Heart: normal rate, regular rhythm, normal S1, S2, no murmurs, rubs, clicks or gallops  Abdomen: soft, nontender, nondistended, no rigidity, rebound, or guarding.   Back: no point tenderness over spine  Extremities: peripheral pulses normal, no unilateral leg swelling or calf tenderness   Neurological:alert, oriented, normal speech, no new focal findings or movement disorder noted from baseline    BP Readings from Last 3 Encounters:   05/03/23 114/74   03/21/23 120/70   02/16/23 132/85     Wt Readings from Last 3 Encounters:   05/03/23 107 kg (235 lb 14.3 oz)   03/21/23 106.5 kg (234 lb 12.6 oz)   02/16/23 108.4 kg (239 lb 1.4 oz)     /74 (BP Location: Right arm, Patient Position: Sitting, BP Method: Large (Manual))   Pulse 68   Temp 97.8 °F (36.6 °C) (Oral)   Resp 18   Ht 6' 2" (1.88 m)   Wt 107 kg (235 lb 14.3 oz)   SpO2 98%   BMI 30.29 kg/m²    274}  Laboratory: I have reviewed old labs below:    274}    Lab Results   Component Value Date    WBC 5.76 08/11/2020    HGB 14.9 08/11/2020    HCT 46.4 08/11/2020    MCV 96 08/11/2020     08/11/2020     08/11/2020    K 4.1 08/11/2020     08/11/2020    CALCIUM 9.9 08/11/2020    CO2 26 08/11/2020     08/11/2020    BUN 11 08/11/2020    CREATININE 0.9 08/11/2020    ANIONGAP 9 08/11/2020    PROT 7.9 08/11/2020    ALBUMIN 4.7 08/11/2020    BILITOT 1.1 (H) 08/11/2020    ALKPHOS 98 08/11/2020    ALT 44 08/11/2020    AST 21 08/11/2020    CHOL 268 (H) 08/11/2020    TRIG 224 (H) 08/11/2020    HDL 38 (L) 08/11/2020    LDLCALC 185.2 (H) 08/11/2020    GLUF 92 02/18/2019     Lab reviewed by me: Particular labs of significance that I will monitor, workup, or treat to improve are mentioned below in diagnostic impression remarks.        Imaging/EKG: I have reviewed the pertinent results and my findings are noted in remarks.  274}    CC:   Chief " "Complaint   Patient presents with    Annual Exam    Establish Care        274}    Assessment/Plan  Denny Atkinson is a 38 y.o. male who presents to clinic with:  1. Encounter for preventive health examination    2. Mixed hyperlipidemia    3. MAHESH (generalized anxiety disorder)       274}  Diagnostic Impression Remarks + HPI     Documentation entered by me for this encounter may have been done in part using speech-recognition technology. Although I have made an effort to ensure accuracy, "sound like" errors may exist and should be interpreted in context.     Preventive will get blood work and follow-up on this  Hyperlipidemia-reports is a high cholesterol in the past and he has a significant family history no history of MIs at an early age.  Will get another cholesterol level consider statin remains elevated could also consider a your calcium score to further give him more information about his risk  MAHESH-stable continue current meds    This is the extent of this pleasant patient's concerns at this present time. He did not feel chest pain upon exertion, dyspnea, nausea, vomiting, diaphoresis, or syncope. No pleuritic chest pain, unilateral leg swelling, calf tenderness, or calf pain. Negative for unintentional weight loss night sweats and fevers. Denny will return to clinic in a few months for further workup and reassessment or sooner as needed. He was instructed to call the clinic or go to the emergency department or urgent care immediately if his symptoms do not improve, worsens, or if any new symptoms develop. As we discussed that symptoms could worsen over the next 24 hours he was advised that if any increased swelling, pain, or numbness arise to go immediately to the ED. Patient knows to call any time if an emergency arises. Shared decision making occurred and he verbalized understanding in agreement with this plan. I discussed imaging findings, diagnosis, possibilities, treatment options, medications, risks, and " benefits. He had many questions regarding the options and long-term effects. All questions were answered. He expressed understanding after counseling regarding the diagnosis and recommendations. He was capable and demonstrated competence with understanding of these options. Shared decision making was performed resulting in him choosing the current treatment plan. Patient handout was given with instructions and recommendations. Advised the patient that if they become pregnant to alert us immediately to assess for medication changes. I also discussed the importance of close follow up to discuss labs, change or modify his medications if needed, monitor side effects, and further evaluation of medical problems.     Additional workup planned: see labs ordered below.    See below for labs and meds ordered with associated diagnosis      1. Encounter for preventive health examination    2. Mixed hyperlipidemia    3. MAHESH (generalized anxiety disorder)      Arnie Woods MD   274}    If you are due for any health screening(s) below please notify me so we can arrange them to be ordered and scheduled. Most healthy patients at your age complete them, but you are free to accept or refuse.     If you can't do it, I'll definitely understand. If you can, I'd certainly appreciate it!   All of your core healthy metrics are met.

## 2023-05-04 ENCOUNTER — LAB VISIT (OUTPATIENT)
Dept: LAB | Facility: HOSPITAL | Age: 38
End: 2023-05-04
Attending: STUDENT IN AN ORGANIZED HEALTH CARE EDUCATION/TRAINING PROGRAM
Payer: COMMERCIAL

## 2023-05-04 ENCOUNTER — PATIENT MESSAGE (OUTPATIENT)
Dept: FAMILY MEDICINE | Facility: CLINIC | Age: 38
End: 2023-05-04
Payer: COMMERCIAL

## 2023-05-04 ENCOUNTER — TELEPHONE (OUTPATIENT)
Dept: FAMILY MEDICINE | Facility: CLINIC | Age: 38
End: 2023-05-04
Payer: COMMERCIAL

## 2023-05-04 DIAGNOSIS — E78.2 MIXED HYPERLIPIDEMIA: Primary | ICD-10-CM

## 2023-05-04 DIAGNOSIS — Z00.00 ENCOUNTER FOR PREVENTIVE HEALTH EXAMINATION: ICD-10-CM

## 2023-05-04 DIAGNOSIS — E78.2 MIXED HYPERLIPIDEMIA: ICD-10-CM

## 2023-05-04 LAB
ALBUMIN SERPL BCP-MCNC: 4.2 G/DL (ref 3.5–5.2)
ALP SERPL-CCNC: 94 U/L (ref 55–135)
ALT SERPL W/O P-5'-P-CCNC: 43 U/L (ref 10–44)
ANION GAP SERPL CALC-SCNC: 8 MMOL/L (ref 8–16)
AST SERPL-CCNC: 25 U/L (ref 10–40)
BASOPHILS # BLD AUTO: 0.03 K/UL (ref 0–0.2)
BASOPHILS NFR BLD: 0.6 % (ref 0–1.9)
BILIRUB SERPL-MCNC: 0.8 MG/DL (ref 0.1–1)
BUN SERPL-MCNC: 13 MG/DL (ref 6–20)
CALCIUM SERPL-MCNC: 9.3 MG/DL (ref 8.7–10.5)
CHLORIDE SERPL-SCNC: 105 MMOL/L (ref 95–110)
CHOLEST SERPL-MCNC: 247 MG/DL (ref 120–199)
CHOLEST/HDLC SERPL: 6.9 {RATIO} (ref 2–5)
CO2 SERPL-SCNC: 27 MMOL/L (ref 23–29)
CREAT SERPL-MCNC: 1 MG/DL (ref 0.5–1.4)
DIFFERENTIAL METHOD: ABNORMAL
EOSINOPHIL # BLD AUTO: 0.2 K/UL (ref 0–0.5)
EOSINOPHIL NFR BLD: 3.7 % (ref 0–8)
ERYTHROCYTE [DISTWIDTH] IN BLOOD BY AUTOMATED COUNT: 12.6 % (ref 11.5–14.5)
EST. GFR  (NO RACE VARIABLE): >60 ML/MIN/1.73 M^2
ESTIMATED AVG GLUCOSE: 108 MG/DL (ref 68–131)
GLUCOSE SERPL-MCNC: 108 MG/DL (ref 70–110)
HBA1C MFR BLD: 5.4 % (ref 4–5.6)
HCT VFR BLD AUTO: 43.8 % (ref 40–54)
HDLC SERPL-MCNC: 36 MG/DL (ref 40–75)
HDLC SERPL: 14.6 % (ref 20–50)
HGB BLD-MCNC: 14.9 G/DL (ref 14–18)
IMM GRANULOCYTES # BLD AUTO: 0.01 K/UL (ref 0–0.04)
IMM GRANULOCYTES NFR BLD AUTO: 0.2 % (ref 0–0.5)
LDLC SERPL CALC-MCNC: 168 MG/DL (ref 63–159)
LYMPHOCYTES # BLD AUTO: 2.4 K/UL (ref 1–4.8)
LYMPHOCYTES NFR BLD: 48.6 % (ref 18–48)
MCH RBC QN AUTO: 30.6 PG (ref 27–31)
MCHC RBC AUTO-ENTMCNC: 34 G/DL (ref 32–36)
MCV RBC AUTO: 90 FL (ref 82–98)
MONOCYTES # BLD AUTO: 0.4 K/UL (ref 0.3–1)
MONOCYTES NFR BLD: 8.8 % (ref 4–15)
NEUTROPHILS # BLD AUTO: 1.9 K/UL (ref 1.8–7.7)
NEUTROPHILS NFR BLD: 38.1 % (ref 38–73)
NONHDLC SERPL-MCNC: 211 MG/DL
NRBC BLD-RTO: 0 /100 WBC
PLATELET # BLD AUTO: 274 K/UL (ref 150–450)
PMV BLD AUTO: 10.2 FL (ref 9.2–12.9)
POTASSIUM SERPL-SCNC: 4.1 MMOL/L (ref 3.5–5.1)
PROT SERPL-MCNC: 7.1 G/DL (ref 6–8.4)
RBC # BLD AUTO: 4.87 M/UL (ref 4.6–6.2)
SODIUM SERPL-SCNC: 140 MMOL/L (ref 136–145)
TRIGL SERPL-MCNC: 215 MG/DL (ref 30–150)
TSH SERPL DL<=0.005 MIU/L-ACNC: 0.92 UIU/ML (ref 0.4–4)
WBC # BLD AUTO: 4.9 K/UL (ref 3.9–12.7)

## 2023-05-04 PROCEDURE — 84443 ASSAY THYROID STIM HORMONE: CPT | Performed by: STUDENT IN AN ORGANIZED HEALTH CARE EDUCATION/TRAINING PROGRAM

## 2023-05-04 PROCEDURE — 85025 COMPLETE CBC W/AUTO DIFF WBC: CPT | Performed by: STUDENT IN AN ORGANIZED HEALTH CARE EDUCATION/TRAINING PROGRAM

## 2023-05-04 PROCEDURE — 80053 COMPREHEN METABOLIC PANEL: CPT | Performed by: STUDENT IN AN ORGANIZED HEALTH CARE EDUCATION/TRAINING PROGRAM

## 2023-05-04 PROCEDURE — 36415 COLL VENOUS BLD VENIPUNCTURE: CPT | Mod: PO | Performed by: STUDENT IN AN ORGANIZED HEALTH CARE EDUCATION/TRAINING PROGRAM

## 2023-05-04 PROCEDURE — 80061 LIPID PANEL: CPT | Performed by: STUDENT IN AN ORGANIZED HEALTH CARE EDUCATION/TRAINING PROGRAM

## 2023-05-04 PROCEDURE — 83036 HEMOGLOBIN GLYCOSYLATED A1C: CPT | Performed by: STUDENT IN AN ORGANIZED HEALTH CARE EDUCATION/TRAINING PROGRAM

## 2023-05-04 NOTE — TELEPHONE ENCOUNTER
----- Message from Eloisa Hagan sent at 5/4/2023  2:56 PM CDT -----  Type:  Test Results    Who Called:  py   Name of Test (Lab/Mammo/Etc):  lab   Date of Test:  5/4   Ordering Provider:  elma  Where the test was performed:  [facility] Call Back Number:  [phone]    Additional Information:  please advise .

## 2023-05-26 ENCOUNTER — OFFICE VISIT (OUTPATIENT)
Dept: PSYCHIATRY | Facility: CLINIC | Age: 38
End: 2023-05-26
Payer: COMMERCIAL

## 2023-05-26 VITALS
WEIGHT: 228.19 LBS | SYSTOLIC BLOOD PRESSURE: 129 MMHG | HEART RATE: 70 BPM | HEIGHT: 74 IN | DIASTOLIC BLOOD PRESSURE: 89 MMHG | BODY MASS INDEX: 29.29 KG/M2

## 2023-05-26 DIAGNOSIS — F41.1 GAD (GENERALIZED ANXIETY DISORDER): ICD-10-CM

## 2023-05-26 DIAGNOSIS — E78.2 MIXED HYPERLIPIDEMIA: Primary | ICD-10-CM

## 2023-05-26 DIAGNOSIS — F42.9 OBSESSIVE-COMPULSIVE DISORDER, UNSPECIFIED TYPE: Primary | ICD-10-CM

## 2023-05-26 PROCEDURE — 99214 PR OFFICE/OUTPT VISIT, EST, LEVL IV, 30-39 MIN: ICD-10-PCS | Mod: S$GLB,,, | Performed by: PHYSICIAN ASSISTANT

## 2023-05-26 PROCEDURE — 3008F BODY MASS INDEX DOCD: CPT | Mod: CPTII,S$GLB,, | Performed by: PHYSICIAN ASSISTANT

## 2023-05-26 PROCEDURE — 3079F DIAST BP 80-89 MM HG: CPT | Mod: CPTII,S$GLB,, | Performed by: PHYSICIAN ASSISTANT

## 2023-05-26 PROCEDURE — 3079F PR MOST RECENT DIASTOLIC BLOOD PRESSURE 80-89 MM HG: ICD-10-PCS | Mod: CPTII,S$GLB,, | Performed by: PHYSICIAN ASSISTANT

## 2023-05-26 PROCEDURE — 3044F PR MOST RECENT HEMOGLOBIN A1C LEVEL <7.0%: ICD-10-PCS | Mod: CPTII,S$GLB,, | Performed by: PHYSICIAN ASSISTANT

## 2023-05-26 PROCEDURE — 3044F HG A1C LEVEL LT 7.0%: CPT | Mod: CPTII,S$GLB,, | Performed by: PHYSICIAN ASSISTANT

## 2023-05-26 PROCEDURE — 3008F PR BODY MASS INDEX (BMI) DOCUMENTED: ICD-10-PCS | Mod: CPTII,S$GLB,, | Performed by: PHYSICIAN ASSISTANT

## 2023-05-26 PROCEDURE — 1160F RVW MEDS BY RX/DR IN RCRD: CPT | Mod: CPTII,S$GLB,, | Performed by: PHYSICIAN ASSISTANT

## 2023-05-26 PROCEDURE — 3074F SYST BP LT 130 MM HG: CPT | Mod: CPTII,S$GLB,, | Performed by: PHYSICIAN ASSISTANT

## 2023-05-26 PROCEDURE — 3074F PR MOST RECENT SYSTOLIC BLOOD PRESSURE < 130 MM HG: ICD-10-PCS | Mod: CPTII,S$GLB,, | Performed by: PHYSICIAN ASSISTANT

## 2023-05-26 PROCEDURE — 99999 PR PBB SHADOW E&M-EST. PATIENT-LVL III: CPT | Mod: PBBFAC,,, | Performed by: PHYSICIAN ASSISTANT

## 2023-05-26 PROCEDURE — 1159F MED LIST DOCD IN RCRD: CPT | Mod: CPTII,S$GLB,, | Performed by: PHYSICIAN ASSISTANT

## 2023-05-26 PROCEDURE — 99999 PR PBB SHADOW E&M-EST. PATIENT-LVL III: ICD-10-PCS | Mod: PBBFAC,,, | Performed by: PHYSICIAN ASSISTANT

## 2023-05-26 PROCEDURE — 1159F PR MEDICATION LIST DOCUMENTED IN MEDICAL RECORD: ICD-10-PCS | Mod: CPTII,S$GLB,, | Performed by: PHYSICIAN ASSISTANT

## 2023-05-26 PROCEDURE — 1160F PR REVIEW ALL MEDS BY PRESCRIBER/CLIN PHARMACIST DOCUMENTED: ICD-10-PCS | Mod: CPTII,S$GLB,, | Performed by: PHYSICIAN ASSISTANT

## 2023-05-26 PROCEDURE — 99214 OFFICE O/P EST MOD 30 MIN: CPT | Mod: S$GLB,,, | Performed by: PHYSICIAN ASSISTANT

## 2023-05-26 RX ORDER — AMOXICILLIN 500 MG
1 CAPSULE ORAL 2 TIMES DAILY
COMMUNITY

## 2023-05-26 RX ORDER — ROSUVASTATIN CALCIUM 10 MG/1
10 TABLET, COATED ORAL DAILY
Qty: 90 TABLET | Refills: 2 | Status: SHIPPED | OUTPATIENT
Start: 2023-05-26 | End: 2024-03-05

## 2023-05-26 RX ORDER — FLUVOXAMINE MALEATE 100 MG/1
100 TABLET, COATED ORAL 2 TIMES DAILY
Start: 2023-05-26 | End: 2023-06-24 | Stop reason: SDUPTHER

## 2023-05-26 NOTE — PROGRESS NOTES
Outpatient Psychiatry Follow-Up Visit (MD/NP)    5/26/2023    Clinical Status of Patient:  Outpatient (Ambulatory)    Chief Complaint:  Denny Atkinson is a 38 y.o. male who presents today for follow-up of anxiety.  Met with patient.      Interval History and Content of Current Session:  Interim Events/Subjective Report/Content of Current Session:   Denny is seen today for medication follow-up.  He reports that he has been up to do quite a bit.  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  He is going to [place] with his wife and daughter on Samina sixth.  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  He has split dosing of Luvox to 100 mg twice daily and seems to be doing well with this.  He has had a bit of weight loss.  Cholesterol has remained elevated and we discussed utilizing a statin that ideally does not interact with Luvox.  Will coordinate with primary care provider to discuss this further.  He denies suicidal or homicidal ideation.  No other complaints today.    FROM PREVIOUS HPI  Denny is seen today for medication follow-up.  Patient reports that he is doing well in regards to obsessive-compulsive symptoms but now feels that generalized anxiety is more bothersome.  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  He states he has been doing okay with Luvox 200 mg nightly and we discussed some adjunctive medications and ultimately has decided to trial  mg of Luvox during the day to see if this does assist with anxiety.  He has completely stopped chewing nicotine gum and is drinking less caffeine.  He reports that his sleep habits have improved.  He does discuss stressful animal situation and that he does have two elderly dogs who are quite a bit of work to take care of.  There also is an outdoor cat that is wanting more attention and is impacting the dogs routine as well.  Was previously on buspirone but did not do well,  "states that he had some issues with addiction of benzodiazepines.  He would like to trial of Luvox adjustment and then we can go from there.  He denies suicidal or homicidal ideation.  No other complaints today.    Review of Systems   PSYCHIATRIC: Pertinant items are noted in the narrative.  RESPIRATORY: No shortness of breath.  CARDIOVASCULAR: No tachycardia or chest pain.  GASTROINTESTINAL: No nausea, vomiting, pain, constipation or diarrhea.    Past Medical, Family and Social History: The patient's past medical, family and social history have been reviewed and updated as appropriate within the electronic medical record - see encounter notes.    Compliance: yes    Side effects: None    Risk Parameters:  Patient reports no suicidal ideation  Patient reports no homicidal ideation  Patient reports no self-injurious behavior  Patient reports no violent behavior    Exam (detailed: at least 9 elements; comprehensive: all 15 elements)   Constitutional  Vitals:  Most recent vital signs, dated less than 90 days prior to this appointment, were reviewed.   Vitals:    05/26/23 0759   BP: 129/89   Pulse: 70   Weight: 103.5 kg (228 lb 2.8 oz)   Height: 6' 2" (1.88 m)        General:  unremarkable, age appropriate     Musculoskeletal  Muscle Strength/Tone:  no dyskinesia   Gait & Station:  non-ataxic     Psychiatric  Speech:  no latency; no press   Mood & Affect:  Pretty good  congruent and appropriate   Thought Process:  normal and logical   Associations:  intact   Thought Content:  normal, no suicidality, no homicidality, delusions, or paranoia   Insight:  intact   Judgement: behavior is adequate to circumstances   Orientation:  grossly intact   Memory: intact for content of interview   Language: grossly intact   Attention Span & Concentration:  able to focus   Fund of Knowledge:  intact and appropriate to age and level of education     Assessment and Diagnosis   Status/Progress: Based on the examination today, the patient's " problem(s) is/are adequately but not ideally controlled.  New problems have not been presented today.   Co-morbidities, Diagnostic uncertainty, and Lack of compliance are not complicating management of the primary condition.      General Impression:   OCD with good insight  MAHESH       Intervention/Counseling/Treatment Plan   Medication Management: The risks and benefits of medication were discussed with the patient.  Counseling provided with patient as follows: importance of compliance with chosen treatment options was emphasized, risks and benefits of treatment options, including medications, were discussed with the patient, risk factor reduction, prognosis    Denny is seen today for medication follow-up.  Patient reports that he has had significant improvement in obsessive-compulsive symptoms with Luvox but now generalized anxiety is more bothersome.  Based on assessment:    Continue Luvox 100 mg twice daily for OCD symptoms.    Please go to emergency department if feeling as though you are a harm to yourself or others or if you are in crisis. Please call the clinic to report any worsening of symptoms or problems associated with medication.    Discussed with patient informed consent, risks vs. benefits, alternative treatments, side effect profile and the inherent unpredictability of individual responses to these treatments. The patient expresses understanding of the above and displays the capacity to agree with this current plan and had no other questions.      Return to Clinic: 2 months, as needed

## 2023-06-24 DIAGNOSIS — F42.9 OBSESSIVE-COMPULSIVE DISORDER, UNSPECIFIED TYPE: ICD-10-CM

## 2023-06-26 RX ORDER — FLUVOXAMINE MALEATE 100 MG/1
100 TABLET, COATED ORAL 2 TIMES DAILY
Qty: 180 TABLET | Refills: 0 | Status: SHIPPED | OUTPATIENT
Start: 2023-06-26 | End: 2023-10-30

## 2023-06-26 NOTE — TELEPHONE ENCOUNTER
Last refill: ?    Disp Refills Start End BARBARA   fluvoxaMINE (LUVOX) 100 MG tablet   5/26/2023  No   Sig - Route: Take 1 tablet (100 mg total) by mouth 2 (two) times daily. - Oral   Class: No Print   Last visit: 5/26  Follow up: 8/21

## 2023-08-21 ENCOUNTER — OFFICE VISIT (OUTPATIENT)
Dept: PSYCHIATRY | Facility: CLINIC | Age: 38
End: 2023-08-21
Payer: COMMERCIAL

## 2023-08-21 VITALS
SYSTOLIC BLOOD PRESSURE: 120 MMHG | HEIGHT: 74 IN | BODY MASS INDEX: 30.43 KG/M2 | WEIGHT: 237.13 LBS | DIASTOLIC BLOOD PRESSURE: 87 MMHG | HEART RATE: 74 BPM

## 2023-08-21 DIAGNOSIS — F42.9 OBSESSIVE-COMPULSIVE DISORDER, UNSPECIFIED TYPE: Primary | ICD-10-CM

## 2023-08-21 DIAGNOSIS — F41.1 GAD (GENERALIZED ANXIETY DISORDER): ICD-10-CM

## 2023-08-21 PROCEDURE — 1159F MED LIST DOCD IN RCRD: CPT | Mod: CPTII,S$GLB,, | Performed by: PHYSICIAN ASSISTANT

## 2023-08-21 PROCEDURE — 3008F PR BODY MASS INDEX (BMI) DOCUMENTED: ICD-10-PCS | Mod: CPTII,S$GLB,, | Performed by: PHYSICIAN ASSISTANT

## 2023-08-21 PROCEDURE — 99214 PR OFFICE/OUTPT VISIT, EST, LEVL IV, 30-39 MIN: ICD-10-PCS | Mod: S$GLB,,, | Performed by: PHYSICIAN ASSISTANT

## 2023-08-21 PROCEDURE — 1160F PR REVIEW ALL MEDS BY PRESCRIBER/CLIN PHARMACIST DOCUMENTED: ICD-10-PCS | Mod: CPTII,S$GLB,, | Performed by: PHYSICIAN ASSISTANT

## 2023-08-21 PROCEDURE — 99999 PR PBB SHADOW E&M-EST. PATIENT-LVL III: CPT | Mod: PBBFAC,,, | Performed by: PHYSICIAN ASSISTANT

## 2023-08-21 PROCEDURE — 3008F BODY MASS INDEX DOCD: CPT | Mod: CPTII,S$GLB,, | Performed by: PHYSICIAN ASSISTANT

## 2023-08-21 PROCEDURE — 1159F PR MEDICATION LIST DOCUMENTED IN MEDICAL RECORD: ICD-10-PCS | Mod: CPTII,S$GLB,, | Performed by: PHYSICIAN ASSISTANT

## 2023-08-21 PROCEDURE — 3074F SYST BP LT 130 MM HG: CPT | Mod: CPTII,S$GLB,, | Performed by: PHYSICIAN ASSISTANT

## 2023-08-21 PROCEDURE — 3079F PR MOST RECENT DIASTOLIC BLOOD PRESSURE 80-89 MM HG: ICD-10-PCS | Mod: CPTII,S$GLB,, | Performed by: PHYSICIAN ASSISTANT

## 2023-08-21 PROCEDURE — 99999 PR PBB SHADOW E&M-EST. PATIENT-LVL III: ICD-10-PCS | Mod: PBBFAC,,, | Performed by: PHYSICIAN ASSISTANT

## 2023-08-21 PROCEDURE — 3074F PR MOST RECENT SYSTOLIC BLOOD PRESSURE < 130 MM HG: ICD-10-PCS | Mod: CPTII,S$GLB,, | Performed by: PHYSICIAN ASSISTANT

## 2023-08-21 PROCEDURE — 3079F DIAST BP 80-89 MM HG: CPT | Mod: CPTII,S$GLB,, | Performed by: PHYSICIAN ASSISTANT

## 2023-08-21 PROCEDURE — 99214 OFFICE O/P EST MOD 30 MIN: CPT | Mod: S$GLB,,, | Performed by: PHYSICIAN ASSISTANT

## 2023-08-21 PROCEDURE — 1160F RVW MEDS BY RX/DR IN RCRD: CPT | Mod: CPTII,S$GLB,, | Performed by: PHYSICIAN ASSISTANT

## 2023-08-21 PROCEDURE — 3044F HG A1C LEVEL LT 7.0%: CPT | Mod: CPTII,S$GLB,, | Performed by: PHYSICIAN ASSISTANT

## 2023-08-21 PROCEDURE — 3044F PR MOST RECENT HEMOGLOBIN A1C LEVEL <7.0%: ICD-10-PCS | Mod: CPTII,S$GLB,, | Performed by: PHYSICIAN ASSISTANT

## 2023-08-21 NOTE — PROGRESS NOTES
Outpatient Psychiatry Follow-Up Visit (MD/NP)    8/21/2023    Clinical Status of Patient:  Outpatient (Ambulatory)    Chief Complaint:  Denny Atkinson is a 38 y.o. male who presents today for follow-up of anxiety.  Met with patient.      Interval History and Content of Current Session:  Interim Events/Subjective Report/Content of Current Session:   Denny is seen today for medication follow-up.  He reports that he is doing okay.  The semester is about to start and he reports that some stressors have calmed down.  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  He does feel that medications are supporting him.  He is potentially interested in imtt with Dr. Hardwick for OCD symptoms.  Will reach out to her to see if this is a potential option.  He denies suicidal or homicidal ideation.  No other complaints today.    FROM PREVIOUS HPI  Denny is seen today for medication follow-up.  He reports that he has been up to do quite a bit.  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  He is going to [place] with his wife and daughter on Samina sixth.  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  He has split dosing of Luvox to 100 mg twice daily and seems to be doing well with this.  He has had a bit of weight loss.  Cholesterol has remained elevated and we discussed utilizing a statin that ideally does not interact with Luvox.  Will coordinate with primary care provider to discuss this further.  He denies suicidal or homicidal ideation.  No other complaints today.    GAD7 8/21/2023 5/26/2023 2/16/2023   1. Feeling nervous, anxious, or on edge? 1 1 1   2. Not being able to stop or control worrying? 1 1 1   3. Worrying too much about different things? 1 1 1   4. Trouble relaxing? 1 1 1   5. Being so restless that it is hard to sit still? 1 1 1   6. Becoming easily annoyed or irritable? 1 1 1   7. Feeling afraid as if something awful might  happen? 0 0 0   8. If you checked off any problems, how difficult have these problems made it for you to do your work, take care of things at home, or get along with other people? 1 1 1   MAHESH-7 Score 6 6 6       PHQ9 8/21/2023   Little interest or pleasure in doing things: Several days   Feeling down, depressed or hopeless: Several days   Trouble falling asleep, staying asleep, or sleeping too much: Several days   Feeling tired or having little energy: Several days   Poor appetite or overeating: Several days   Feeling bad about yourself - or that you are a failure or have let yourself or family down: Not at all   Trouble concentrating on things, such as reading the newspaper or watching television: Several days   Moving or speaking so slowly that other people could have noticed. Or the opposite,being so fidgety or restless that you have been moving around a lot more than usual: Not at all   Thoughts that you would be better off dead or hurting yourself in some way: Not at all   If you indicated you have experienced any of the previous problems, how difficult have these problems made it for you to do your work, take care of things at home or get along with other people? -   Total Score 6       Review of Systems   PSYCHIATRIC: Pertinant items are noted in the narrative.  RESPIRATORY: No shortness of breath.  CARDIOVASCULAR: No tachycardia or chest pain.  GASTROINTESTINAL: No nausea, vomiting, pain, constipation or diarrhea.    Past Medical, Family and Social History: The patient's past medical, family and social history have been reviewed and updated as appropriate within the electronic medical record - see encounter notes.    Compliance: yes    Side effects: None    Risk Parameters:  Patient reports no suicidal ideation  Patient reports no homicidal ideation  Patient reports no self-injurious behavior  Patient reports no violent behavior    Exam (detailed: at least 9 elements; comprehensive: all 15 elements)    Constitutional  Vitals:  Most recent vital signs, dated less than 90 days prior to this appointment, were reviewed.   Vitals:    08/21/23 0836   BP: 120/87   Pulse: 74          General:  unremarkable, age appropriate     Musculoskeletal  Muscle Strength/Tone:  no dyskinesia   Gait & Station:  non-ataxic     Psychiatric  Speech:  no latency; no press   Mood & Affect:  Pretty good  congruent and appropriate   Thought Process:  normal and logical   Associations:  intact   Thought Content:  normal, no suicidality, no homicidality, delusions, or paranoia   Insight:  intact   Judgement: behavior is adequate to circumstances   Orientation:  grossly intact   Memory: intact for content of interview   Language: grossly intact   Attention Span & Concentration:  able to focus   Fund of Knowledge:  intact and appropriate to age and level of education     Assessment and Diagnosis   Status/Progress: Based on the examination today, the patient's problem(s) is/are adequately but not ideally controlled.  New problems have not been presented today.   Co-morbidities, Diagnostic uncertainty, and Lack of compliance are not complicating management of the primary condition.      General Impression:   OCD with good insight  MAHESH       Intervention/Counseling/Treatment Plan   Medication Management: The risks and benefits of medication were discussed with the patient.  Counseling provided with patient as follows: importance of compliance with chosen treatment options was emphasized, risks and benefits of treatment options, including medications, were discussed with the patient, risk factor reduction, prognosis    Denny is seen today for medication follow-up. Based on assessment:    Continue Luvox 100 mg twice daily for OCD symptoms.  Will reach out to Dr. Hardwick to see if imtt can be of benefit.     Please go to emergency department if feeling as though you are a harm to yourself or others or if you are in crisis. Please call the clinic to  report any worsening of symptoms or problems associated with medication.    Discussed with patient informed consent, risks vs. benefits, alternative treatments, side effect profile and the inherent unpredictability of individual responses to these treatments. The patient expresses understanding of the above and displays the capacity to agree with this current plan and had no other questions.      Return to Clinic: as scheduled, as needed

## 2023-08-22 ENCOUNTER — PATIENT MESSAGE (OUTPATIENT)
Dept: PSYCHIATRY | Facility: CLINIC | Age: 38
End: 2023-08-22
Payer: COMMERCIAL

## 2023-08-23 ENCOUNTER — PATIENT MESSAGE (OUTPATIENT)
Dept: FAMILY MEDICINE | Facility: CLINIC | Age: 38
End: 2023-08-23
Payer: COMMERCIAL

## 2023-10-29 DIAGNOSIS — F42.9 OBSESSIVE-COMPULSIVE DISORDER, UNSPECIFIED TYPE: ICD-10-CM

## 2023-10-30 RX ORDER — FLUVOXAMINE MALEATE 100 MG/1
100 TABLET, COATED ORAL 2 TIMES DAILY
Qty: 180 TABLET | Refills: 0 | Status: SHIPPED | OUTPATIENT
Start: 2023-10-30 | End: 2023-11-16 | Stop reason: SDUPTHER

## 2023-10-30 NOTE — TELEPHONE ENCOUNTER
Medication requested-luvox   Last RX-6-26-23   Qty-180  Refills-0  Last office [id number]-4-32-23  Next office [id number]-14-16-23    
Patient/Caregiver provided printed discharge information.

## 2023-11-16 ENCOUNTER — OFFICE VISIT (OUTPATIENT)
Dept: PSYCHIATRY | Facility: CLINIC | Age: 38
End: 2023-11-16
Payer: COMMERCIAL

## 2023-11-16 VITALS
BODY MASS INDEX: 30.42 KG/M2 | WEIGHT: 237 LBS | SYSTOLIC BLOOD PRESSURE: 118 MMHG | DIASTOLIC BLOOD PRESSURE: 84 MMHG | HEIGHT: 74 IN | HEART RATE: 76 BPM

## 2023-11-16 DIAGNOSIS — F41.1 GAD (GENERALIZED ANXIETY DISORDER): ICD-10-CM

## 2023-11-16 DIAGNOSIS — F42.9 OBSESSIVE-COMPULSIVE DISORDER, UNSPECIFIED TYPE: Primary | ICD-10-CM

## 2023-11-16 PROCEDURE — 3079F DIAST BP 80-89 MM HG: CPT | Mod: CPTII,S$GLB,, | Performed by: PHYSICIAN ASSISTANT

## 2023-11-16 PROCEDURE — 3079F PR MOST RECENT DIASTOLIC BLOOD PRESSURE 80-89 MM HG: ICD-10-PCS | Mod: CPTII,S$GLB,, | Performed by: PHYSICIAN ASSISTANT

## 2023-11-16 PROCEDURE — 3074F SYST BP LT 130 MM HG: CPT | Mod: CPTII,S$GLB,, | Performed by: PHYSICIAN ASSISTANT

## 2023-11-16 PROCEDURE — 1159F MED LIST DOCD IN RCRD: CPT | Mod: CPTII,S$GLB,, | Performed by: PHYSICIAN ASSISTANT

## 2023-11-16 PROCEDURE — 1159F PR MEDICATION LIST DOCUMENTED IN MEDICAL RECORD: ICD-10-PCS | Mod: CPTII,S$GLB,, | Performed by: PHYSICIAN ASSISTANT

## 2023-11-16 PROCEDURE — 3074F PR MOST RECENT SYSTOLIC BLOOD PRESSURE < 130 MM HG: ICD-10-PCS | Mod: CPTII,S$GLB,, | Performed by: PHYSICIAN ASSISTANT

## 2023-11-16 PROCEDURE — 3008F BODY MASS INDEX DOCD: CPT | Mod: CPTII,S$GLB,, | Performed by: PHYSICIAN ASSISTANT

## 2023-11-16 PROCEDURE — 3044F HG A1C LEVEL LT 7.0%: CPT | Mod: CPTII,S$GLB,, | Performed by: PHYSICIAN ASSISTANT

## 2023-11-16 PROCEDURE — 1160F PR REVIEW ALL MEDS BY PRESCRIBER/CLIN PHARMACIST DOCUMENTED: ICD-10-PCS | Mod: CPTII,S$GLB,, | Performed by: PHYSICIAN ASSISTANT

## 2023-11-16 PROCEDURE — 99999 PR PBB SHADOW E&M-EST. PATIENT-LVL IV: CPT | Mod: PBBFAC,,, | Performed by: PHYSICIAN ASSISTANT

## 2023-11-16 PROCEDURE — 99999 PR PBB SHADOW E&M-EST. PATIENT-LVL IV: ICD-10-PCS | Mod: PBBFAC,,, | Performed by: PHYSICIAN ASSISTANT

## 2023-11-16 PROCEDURE — 99214 OFFICE O/P EST MOD 30 MIN: CPT | Mod: S$GLB,,, | Performed by: PHYSICIAN ASSISTANT

## 2023-11-16 PROCEDURE — 99214 PR OFFICE/OUTPT VISIT, EST, LEVL IV, 30-39 MIN: ICD-10-PCS | Mod: S$GLB,,, | Performed by: PHYSICIAN ASSISTANT

## 2023-11-16 PROCEDURE — 3008F PR BODY MASS INDEX (BMI) DOCUMENTED: ICD-10-PCS | Mod: CPTII,S$GLB,, | Performed by: PHYSICIAN ASSISTANT

## 2023-11-16 PROCEDURE — 1160F RVW MEDS BY RX/DR IN RCRD: CPT | Mod: CPTII,S$GLB,, | Performed by: PHYSICIAN ASSISTANT

## 2023-11-16 PROCEDURE — 3044F PR MOST RECENT HEMOGLOBIN A1C LEVEL <7.0%: ICD-10-PCS | Mod: CPTII,S$GLB,, | Performed by: PHYSICIAN ASSISTANT

## 2023-11-16 RX ORDER — ARIPIPRAZOLE 2 MG/1
2 TABLET ORAL DAILY
Qty: 30 TABLET | Refills: 0 | Status: SHIPPED | OUTPATIENT
Start: 2023-11-16 | End: 2023-12-10 | Stop reason: SDUPTHER

## 2023-11-16 RX ORDER — FLUVOXAMINE MALEATE 100 MG/1
100 TABLET, COATED ORAL NIGHTLY
Start: 2023-11-16 | End: 2024-02-16 | Stop reason: SDUPTHER

## 2023-11-16 NOTE — PROGRESS NOTES
Outpatient Psychiatry Follow-Up Visit (MD/NP)    11/16/2023    Clinical Status of Patient:  Outpatient (Ambulatory)    Chief Complaint:  Denny Atkinson is a 38 y.o. male who presents today for follow-up of anxiety.  Met with patient.      Interval History and Content of Current Session:  Interim Events/Subjective Report/Content of Current Session:   Denny is seen today for medication follow-up.  He reports increase in obsessive-compulsive symptoms.  He is interested in medication to augment Luvox.  We discussed trial of aripiprazole and discussed medication risks, side effects, benefits in detail.  He would like to try this medication.  Has some upcoming busy but also exciting times ahead for work.  He denies suicidal or homicidal ideation.  No other complaints today.    FROM PREVIOUS HPI  Denny is seen today for medication follow-up.  He reports that he is doing okay.  The semester is about to start and he reports that some stressors have calmed down.  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  He does feel that medications are supporting him.  He is potentially interested in imtt with Dr. Hardwick for OCD symptoms.  Will reach out to her to see if this is a potential option.  He denies suicidal or homicidal ideation.  No other complaints today.        11/16/2023     8:31 AM 8/21/2023     8:28 AM 5/26/2023     7:56 AM   GAD7   1. Feeling nervous, anxious, or on edge? 1 1 1   2. Not being able to stop or control worrying? 1 1 1   3. Worrying too much about different things? 1 1 1   4. Trouble relaxing? 1 1 1   5. Being so restless that it is hard to sit still? 1 1 1   6. Becoming easily annoyed or irritable? 1 1 1   7. Feeling afraid as if something awful might happen? 0 0 0   8. If you checked off any problems, how difficult have these problems made it for you to do your work, take care of things at home, or get along with  other people? 1 1 1   MAHESH-7 Score 6 6 6       Review of Systems   PSYCHIATRIC: Pertinant items are noted in the narrative.  RESPIRATORY: No shortness of breath.  CARDIOVASCULAR: No tachycardia or chest pain.  GASTROINTESTINAL: No nausea, vomiting, pain, constipation or diarrhea.    Past Medical, Family and Social History: The patient's past medical, family and social history have been reviewed and updated as appropriate within the electronic medical record - see encounter notes.    Compliance: yes    Side effects: None    Risk Parameters:  Patient reports no suicidal ideation  Patient reports no homicidal ideation  Patient reports no self-injurious behavior  Patient reports no violent behavior    Exam (detailed: at least 9 elements; comprehensive: all 15 elements)   Constitutional  Vitals:  Most recent vital signs, dated less than 90 days prior to this appointment, were reviewed.   Vitals:    11/16/23 0836   BP: 118/84   Pulse: 76          General:  unremarkable, age appropriate     Musculoskeletal  Muscle Strength/Tone:  no dyskinesia   Gait & Station:  non-ataxic     Psychiatric  Speech:  no latency; no press   Mood & Affect:  anxious  congruent and appropriate   Thought Process:  normal and logical   Associations:  intact   Thought Content:  normal, no suicidality, no homicidality, delusions, or paranoia   Insight:  intact   Judgement: behavior is adequate to circumstances   Orientation:  grossly intact   Memory: intact for content of interview   Language: grossly intact   Attention Span & Concentration:  able to focus   Fund of Knowledge:  intact and appropriate to age and level of education     Assessment and Diagnosis   Status/Progress: Based on the examination today, the patient's problem(s) is/are adequately but not ideally controlled.  New problems have not been presented today.   Co-morbidities, Diagnostic uncertainty, and Lack of compliance are not complicating management of the primary condition.       General Impression:   OCD with good insight  MAHESH     Intervention/Counseling/Treatment Plan   Medication Management: The risks and benefits of medication were discussed with the patient.  Counseling provided with patient as follows: importance of compliance with chosen treatment options was emphasized, risks and benefits of treatment options, including medications, were discussed with the patient, risk factor reduction, prognosis    Denny is seen today for medication follow-up. Based on assessment:    Continue Luvox 100 mg twice daily for OCD symptoms.  Trial aripiprazole 2 mg once daily for augmentation OCD symptoms.  Medication risks, side effects, benefits explained in detail. Discussed risks of tardive dyskinesia, drug induced parkinsonism, metabolic side effects, including weight gain, neuroleptic malignant syndrome     Please go to emergency department if feeling as though you are a harm to yourself or others or if you are in crisis. Please call the clinic to report any worsening of symptoms or problems associated with medication.    Discussed with patient informed consent, risks vs. benefits, alternative treatments, side effect profile and the inherent unpredictability of individual responses to these treatments. The patient expresses understanding of the above and displays the capacity to agree with this current plan and had no other questions.      Return to Clinic: as scheduled, as needed

## 2023-11-16 NOTE — PATIENT INSTRUCTIONS
"Antipsychotic augmentation - In most cases, augmentation with a second-generation antipsychotic is our first choice in augmentation. Our preference is based on this strategy having the strongest evidence from randomized trials [[id number]] and their favorable side effect profile, including lower likelihood of extrapyramidal symptoms. (See "Second-generation antipsychotic medications: Pharmacology, administration, and side effects".)  We typically start with a very low dose of an atypical antipsychotic and titrate as tolerated. As examples, we often start augmentation with aripiprazole at 2 mg/day and titrate to 5 to 10 mg per day over one month. When using risperidone, we begin at 0.5 mg per day and titrate to 2 to 4 mg over one month. Effective target doses of adjunctive antipsychotics are generally lower than when these medications are used for treatment of psychotic disorders or bipolar disorder. For example, doses of adjunctive aripiprazole 5 to 10 mg/day, risperidone 2 to 4 mg/day, and olanzapine 5 to 10 mg/day have been found to be effective for serotonin reuptake inhibitor-refractory OCD in placebo-controlled trials [58,59,61]. For individuals with sensitivity to weight gain, we typically begin with adding adjunctive ziprasidone, an antipsychotic medication that is not associated with weight gain.      Reduce Luvox 100mg once daily for OCD.  Trial Abilify 2mg once daily for augmentation.   "

## 2023-12-04 ENCOUNTER — PATIENT MESSAGE (OUTPATIENT)
Dept: PSYCHIATRY | Facility: CLINIC | Age: 38
End: 2023-12-04
Payer: COMMERCIAL

## 2023-12-05 ENCOUNTER — TELEPHONE (OUTPATIENT)
Dept: PSYCHIATRY | Facility: CLINIC | Age: 38
End: 2023-12-05
Payer: COMMERCIAL

## 2023-12-06 ENCOUNTER — TELEPHONE (OUTPATIENT)
Dept: PSYCHIATRY | Facility: CLINIC | Age: 38
End: 2023-12-06
Payer: COMMERCIAL

## 2023-12-06 NOTE — TELEPHONE ENCOUNTER
Pt returned call. He states he is doing well with med adjustments. Reports it is working as he was hoping and he has no concerns at this time.

## 2023-12-10 DIAGNOSIS — F42.9 OBSESSIVE-COMPULSIVE DISORDER, UNSPECIFIED TYPE: ICD-10-CM

## 2023-12-11 RX ORDER — ARIPIPRAZOLE 2 MG/1
2 TABLET ORAL DAILY
Qty: 30 TABLET | Refills: 1 | Status: SHIPPED | OUTPATIENT
Start: 2023-12-11 | End: 2024-02-12

## 2023-12-11 NOTE — TELEPHONE ENCOUNTER
- goal normotension:  Avoid hypotension  - Management as per medicine team  Medication requested-abilify   Last RX-11-16-23   Qty-30  Refills-0  Last office [id number]-90-04-23  Next office [id number]-0-84-24

## 2024-02-10 DIAGNOSIS — F42.9 OBSESSIVE-COMPULSIVE DISORDER, UNSPECIFIED TYPE: ICD-10-CM

## 2024-02-12 RX ORDER — ARIPIPRAZOLE 2 MG/1
2 TABLET ORAL
Qty: 30 TABLET | Refills: 1 | Status: SHIPPED | OUTPATIENT
Start: 2024-02-12 | End: 2024-04-08

## 2024-02-16 ENCOUNTER — OFFICE VISIT (OUTPATIENT)
Dept: PSYCHIATRY | Facility: CLINIC | Age: 39
End: 2024-02-16
Payer: COMMERCIAL

## 2024-02-16 DIAGNOSIS — F41.1 GAD (GENERALIZED ANXIETY DISORDER): ICD-10-CM

## 2024-02-16 DIAGNOSIS — F42.9 OBSESSIVE-COMPULSIVE DISORDER, UNSPECIFIED TYPE: Primary | ICD-10-CM

## 2024-02-16 DIAGNOSIS — F42.2 MIXED OBSESSIONAL THOUGHTS AND ACTS: ICD-10-CM

## 2024-02-16 PROCEDURE — 1159F MED LIST DOCD IN RCRD: CPT | Mod: CPTII,S$GLB,, | Performed by: PHYSICIAN ASSISTANT

## 2024-02-16 PROCEDURE — 99214 OFFICE O/P EST MOD 30 MIN: CPT | Mod: S$GLB,,, | Performed by: PHYSICIAN ASSISTANT

## 2024-02-16 PROCEDURE — G2211 COMPLEX E/M VISIT ADD ON: HCPCS | Mod: S$GLB,,, | Performed by: PHYSICIAN ASSISTANT

## 2024-02-16 PROCEDURE — 1160F RVW MEDS BY RX/DR IN RCRD: CPT | Mod: CPTII,S$GLB,, | Performed by: PHYSICIAN ASSISTANT

## 2024-02-16 PROCEDURE — 99999 PR PBB SHADOW E&M-EST. PATIENT-LVL III: CPT | Mod: PBBFAC,,, | Performed by: PHYSICIAN ASSISTANT

## 2024-02-16 RX ORDER — FLUVOXAMINE MALEATE 100 MG/1
100 TABLET, COATED ORAL NIGHTLY
Qty: 30 TABLET | Refills: 1 | Status: SHIPPED | OUTPATIENT
Start: 2024-02-16 | End: 2024-06-03 | Stop reason: SDUPTHER

## 2024-02-16 NOTE — PROGRESS NOTES
Outpatient Psychiatry Follow-Up Visit (MD/NP)    2/16/2024    Clinical Status of Patient:  Outpatient (Ambulatory)    Chief Complaint:  Denny Atkinson is a 39 y.o. male who presents today for follow-up of anxiety.  Met with patient.      Interval History and Content of Current Session:  Interim Events/Subjective Report/Content of Current Session:   Denny is seen today for medication follow-up.  He reports that he is doing well with addition of Abilify.  Has upcoming work engagements and we speak to this in detail.  He does feel like he has been tired and we discussed utilizing nighttime dosing of the medication.  Has introduced a bit more caffeine but has been tolerating well.  Would like to continue current medications as prescribed.  Denies suicidal or homicidal ideation.  No hallucinations or paranoia.  No other complaints today.    FROM PREVIOUS HPI  Denny is seen today for medication follow-up.  He reports increase in obsessive-compulsive symptoms.  He is interested in medication to augment Luvox.  We discussed trial of aripiprazole and discussed medication risks, side effects, benefits in detail.  He would like to try this medication.  Has some upcoming busy but also exciting times ahead for work.  He denies suicidal or homicidal ideation.  No other complaints today.    Outpatient Psychiatry Initial Visit (MD/NP)     1/26/2022     Denny Atkinson, a 37 y.o. male, presenting for initial evaluation visit. Met with patient.     Reason for Encounter: Referral from Ben Dos Santos. Patient complains of OCD/anxiety.     History of Present Illness:   This is a 37-year-old male, past medical history of hyperlipidemia, who presents today for initial evaluation.  He reports that he is here due to significant amount of anxiety in particular obsessive-compulsive related symptoms.  He states he is obsessively worrying.  He is a  and manages many graduates students.  He reports this is a very high  stress/high pressure job.  His wife is a professor as well.  He has a 2-year-old girl, this is their first child. He has been on escitalopram for many years.  He states that is been working pretty well but he still does endorse a significant amount of anxiety.  Has trouble with sleep.  He does state that he drinks too much coffee and also uses Nicorette gum is a stimulant.  He states he used to play basketball 3-4 nights per week prior to COVID and this helped him sleep much more.  He has been exercising some but it is not enough to help him with his sleep.  He states this was a major stress release.  He has struggled for a while with tasks at his job.  He endorses ruminating thoughts.  Will have detailed notes that he has to reread and add to them.  It is impacting his functioning at this time.  When he was younger, he used to do repetitive handwashing but is not doing this now.  He does find that his symptoms are impacting his day-to-day life.  Medications that he has tried include bupropion, sertraline, fluoxetine, paroxetine.  He reports that he did have symptoms of serotonin toxicity during a cross taper of medications.  He states he is sensitive to medications.  Has not been on tricyclic antidepressants or Luvox.  Unsure about trazodone.  He previously participated in therapy for many years, interested in therapy referral today.  Discussed sleep hygiene in detail.  Denies suicidal or homicidal ideation.  No other complaints today.     Endorses prior problems with substance use disorders - does not want to be prescribed anything habit forming.     Depression symptoms:  Endorses difficulty with sleep, feeling tired or having little energy, overeating, trouble concentrating.  Denies suicidal ideation.  PHQ-9 seven, somewhat difficult     Anxiety symptoms:  GAD7 1/27/2022   1. Feeling nervous, anxious, or on edge? 1   2. Not being able to stop or control worrying? 3   3. Worrying too much about different things? 3    4. Trouble relaxing? 2   5. Being so restless that it is hard to sit still? 0   6. Becoming easily annoyed or irritable? 1   7. Feeling afraid as if something awful might happen? 0   8. If you checked off any problems, how difficult have these problems made it for you to do your work, take care of things at home, or get along with other people? 1   MAHESH-7 Score 10            Fina/Hypomania symptoms: denies  MDQ Scale 1/27/2022   you felt so good or so hyper that other people thought you were not your normal self or you were so hyper that you got into trouble? 0   you were so irritable that you shouted at people or started fights or arguments? 0   you felt much more self-confident than usual? 0   you got much less sleep than usual and found that you didn't really miss it? 0   you were more talkative or spoke much faster than usual? 0   thoughts raced through your head or you couldn't slow your mind down? 1   you were so easily distracted by things around you that you had trouble concentrating or staying on track? 1   you had more energy than usual? 0   you were much more active or did many more things than usual? 0   you were much more social or outgoing than usual, for example, you telephoned friends in the middle of the night? 0   you were much more interested in sex than usual? 0   you did things that were unusual for you or that other people might have thought were excessive, foolish, or risky? 0   spending money got you or your family in trouble? 0   If you checked YES to more than one of the above, have several of these ever happened during the same period of time? 1   How much of a problem did any of these cause you - like being unable to work; having family, money or legal troubles; getting into arguments or fights? Moderate problem   Mood Disorder Questionnaire Score  2      Psychosis: denies     Attention/Concentration: fair     Body Image/Hx of eating disorders: denies, eating too much occasionally       Suicidal ideation and risk: denies suicidal thoughts, no access to guns, no hx of suicidal thoughts or attempts     Homicidal/Violient ideation and risk: denies     Sleep: poor sleep hygiene     Appetite: overeating     Past Psychiatric History:  Prior diagnoses: OCD, MAHESH, never been diagnosed with ADHD      Inpatient psychiatric treatment: denies     Outpatient psychiatric treatment: last saw psychiatrist, saw someone before Dr. Cast. Four or so years ago. Wasn't really thrilled. Was happy just on lexapro.      Prior medications: as described above     Current medications: lexapro 20mg daily     Prior suicide attempts: denies     Prior history self harm: denies     Prior psychotherapy: has participated in the past     Prior psychological testing: none     Allergies:       Review of patient's allergies indicates:   Allergen Reactions    Center-al house dust        Past Medical History:       Past Medical History:   Diagnosis Date    Allergy      Anxiety     Elevated cholesterol - not taking medicine      History TBI: denies  History seizures: denies     Past Surgical History:        Past Surgical History:   Procedure Laterality Date    FINGER SURGERY   2007    HERNIA REPAIR   1985      Family History:   Suicide: denies  Substance use: great grandfather   Bipolar disorder/Psychotic disorder: denies  Anxiety: brother, mom and dad  Depression: denies     Social History:  Childhood: born in [place]. Grew up in [place]. Went to college in [employer]. Raised by biological mother and father. Good relationship. They live in [place], [place]. One brother in [place] - younger brother. He's doing well. He has some anxiety, definitely not OCD.   Marital status: has been  for almost 10 years, supportive wife   Children: 2 year old daughter, August   Resides: in [place], [place]  Occupation: professor - geology   Hobbies: basketball, enjoys walking/hiking with the family, riding bikes, likes to be outside  Christian: not religlious   Education  level: PhD  : denies  Legal: denies  History of abuse/trauma: denies     Substance History:  Tobacco: nicorette gum - used to smoke cigarettes, quit smoking cigarettes probably 12 years ago. Gum - 3 or 4 pieces of day - at night.   Alcohol: no alcohol, when he was in high school would drink a lot. Has addictive personality.   Drug use: high school - opioids, not stimulants, used benzos, had to go treatment.   Caffeine: high caffeine - one of those yeti of coffee - make at home, community coffee      Rehab:  Prior/current AA: inpatient detox/rehab 20 years ago - several times between [age], before college     Review of Systems   PSYCHIATRIC: Pertinant items are noted in the narrative.  RESPIRATORY: No shortness of breath.  CARDIOVASCULAR: No tachycardia or chest pain.  GASTROINTESTINAL: No nausea, vomiting, pain, constipation or diarrhea.    Past Medical, Family and Social History: The patient's past medical, family and social history have been reviewed and updated as appropriate within the electronic medical record - see encounter notes.    Compliance: yes    Side effects: None    Risk Parameters:  Patient reports no suicidal ideation  Patient reports no homicidal ideation  Patient reports no self-injurious behavior  Patient reports no violent behavior    Exam (detailed: at least 9 elements; comprehensive: all 15 elements)   Constitutional  Vitals:  Most recent vital signs, dated less than 90 days prior to this appointment, were reviewed.   There were no vitals filed for this visit.         General:  unremarkable, age appropriate     Musculoskeletal  Muscle Strength/Tone:  no dyskinesia   Gait & Station:  non-ataxic     Psychiatric  Speech:  no latency; no press   Mood & Affect:  good  congruent and appropriate   Thought Process:  normal and logical   Associations:  intact   Thought Content:  normal, no suicidality, no homicidality, delusions, or paranoia   Insight:  intact   Judgement: behavior is adequate  to circumstances   Orientation:  grossly intact   Memory: intact for content of interview   Language: grossly intact   Attention Span & Concentration:  able to focus   Fund of Knowledge:  intact and appropriate to age and level of education     Assessment and Diagnosis   Status/Progress: Based on the examination today, the patient's problem(s) is/are adequately but not ideally controlled.  New problems have not been presented today.   Co-morbidities, Diagnostic uncertainty, and Lack of compliance are not complicating management of the primary condition.      General Impression:   OCD with good insight  MAHESH     Intervention/Counseling/Treatment Plan   Medication Management: The risks and benefits of medication were discussed with the patient.  Counseling provided with patient as follows: importance of compliance with chosen treatment options was emphasized, risks and benefits of treatment options, including medications, were discussed with the patient, risk factor reduction, prognosis    Denny is seen today for medication follow-up. Based on assessment:    Continue Luvox 100 mg nightly for OCD symptoms.  Continue aripiprazole 2 mg once daily for augmentation OCD symptoms.  Medication risks, side effects, benefits explained in detail. Discussed risks of tardive dyskinesia, drug induced parkinsonism, metabolic side effects, including weight gain, neuroleptic malignant syndrome     Please go to emergency department if feeling as though you are a harm to yourself or others or if you are in crisis. Please call the clinic to report any worsening of symptoms or problems associated with medication.    Discussed with patient informed consent, risks vs. benefits, alternative treatments, side effect profile and the inherent unpredictability of individual responses to these treatments. The patient expresses understanding of the above and displays the capacity to agree with this current plan and had no other  questions.      Return to Clinic: as scheduled, as needed

## 2024-02-16 NOTE — PATIENT INSTRUCTIONS
Adjust medicines in the evening.    Please go to emergency department if feeling as though you are a harm to yourself or others or if you are in crisis. Please call the clinic to report any worsening of symptoms or problems associated with medication.

## 2024-03-28 ENCOUNTER — HOSPITAL ENCOUNTER (EMERGENCY)
Facility: HOSPITAL | Age: 39
Discharge: ANOTHER HOSPITAL | End: 2024-03-28
Attending: STUDENT IN AN ORGANIZED HEALTH CARE EDUCATION/TRAINING PROGRAM
Payer: COMMERCIAL

## 2024-03-28 VITALS
WEIGHT: 230 LBS | OXYGEN SATURATION: 100 % | BODY MASS INDEX: 29.52 KG/M2 | HEIGHT: 74 IN | DIASTOLIC BLOOD PRESSURE: 71 MMHG | HEART RATE: 75 BPM | SYSTOLIC BLOOD PRESSURE: 120 MMHG | TEMPERATURE: 98 F | RESPIRATION RATE: 15 BRPM

## 2024-03-28 DIAGNOSIS — R42 VERTIGO: ICD-10-CM

## 2024-03-28 DIAGNOSIS — R42 DIZZINESS: ICD-10-CM

## 2024-03-28 DIAGNOSIS — I77.74 VERTEBRAL ARTERY DISSECTION: Primary | ICD-10-CM

## 2024-03-28 LAB
ALBUMIN SERPL BCP-MCNC: 5.1 G/DL (ref 3.5–5.2)
ALP SERPL-CCNC: 82 U/L (ref 55–135)
ALT SERPL W/O P-5'-P-CCNC: 35 U/L (ref 10–44)
ANION GAP SERPL CALC-SCNC: 10 MMOL/L (ref 8–16)
AST SERPL-CCNC: 19 U/L (ref 10–40)
BASOPHILS # BLD AUTO: 0.01 K/UL (ref 0–0.2)
BASOPHILS NFR BLD: 0.1 % (ref 0–1.9)
BILIRUB SERPL-MCNC: 1.2 MG/DL (ref 0.1–1)
BUN SERPL-MCNC: 14 MG/DL (ref 6–20)
CALCIUM SERPL-MCNC: 10 MG/DL (ref 8.7–10.5)
CHLORIDE SERPL-SCNC: 103 MMOL/L (ref 95–110)
CO2 SERPL-SCNC: 26 MMOL/L (ref 23–29)
CREAT SERPL-MCNC: 0.8 MG/DL (ref 0.5–1.4)
CREAT SERPL-MCNC: 0.9 MG/DL (ref 0.5–1.4)
CRP SERPL-MCNC: 0.6 MG/L (ref 0–8.2)
DIFFERENTIAL METHOD BLD: ABNORMAL
EOSINOPHIL # BLD AUTO: 0 K/UL (ref 0–0.5)
EOSINOPHIL NFR BLD: 0.1 % (ref 0–8)
ERYTHROCYTE [DISTWIDTH] IN BLOOD BY AUTOMATED COUNT: 12.8 % (ref 11.5–14.5)
ERYTHROCYTE [SEDIMENTATION RATE] IN BLOOD BY WESTERGREN METHOD: 3 MM/HR (ref 0–10)
EST. GFR  (NO RACE VARIABLE): >60 ML/MIN/1.73 M^2
GLUCOSE SERPL-MCNC: 148 MG/DL (ref 70–110)
HCT VFR BLD AUTO: 45.1 % (ref 40–54)
HGB BLD-MCNC: 15.7 G/DL (ref 14–18)
IMM GRANULOCYTES # BLD AUTO: 0.03 K/UL (ref 0–0.04)
IMM GRANULOCYTES NFR BLD AUTO: 0.4 % (ref 0–0.5)
INR PPP: 0.9 (ref 0.8–1.2)
LYMPHOCYTES # BLD AUTO: 1 K/UL (ref 1–4.8)
LYMPHOCYTES NFR BLD: 12.6 % (ref 18–48)
MCH RBC QN AUTO: 31.1 PG (ref 27–31)
MCHC RBC AUTO-ENTMCNC: 34.8 G/DL (ref 32–36)
MCV RBC AUTO: 89 FL (ref 82–98)
MONOCYTES # BLD AUTO: 0.4 K/UL (ref 0.3–1)
MONOCYTES NFR BLD: 5.2 % (ref 4–15)
NEUTROPHILS # BLD AUTO: 6.5 K/UL (ref 1.8–7.7)
NEUTROPHILS NFR BLD: 81.6 % (ref 38–73)
NRBC BLD-RTO: 0 /100 WBC
PLATELET # BLD AUTO: 251 K/UL (ref 150–450)
PMV BLD AUTO: 9.9 FL (ref 9.2–12.9)
POTASSIUM SERPL-SCNC: 4 MMOL/L (ref 3.5–5.1)
PROT SERPL-MCNC: 7.7 G/DL (ref 6–8.4)
PROTHROMBIN TIME: 10.4 SEC (ref 9–12.5)
RBC # BLD AUTO: 5.05 M/UL (ref 4.6–6.2)
SAMPLE: NORMAL
SODIUM SERPL-SCNC: 139 MMOL/L (ref 136–145)
WBC # BLD AUTO: 7.93 K/UL (ref 3.9–12.7)

## 2024-03-28 PROCEDURE — 80053 COMPREHEN METABOLIC PANEL: CPT | Performed by: STUDENT IN AN ORGANIZED HEALTH CARE EDUCATION/TRAINING PROGRAM

## 2024-03-28 PROCEDURE — 36415 COLL VENOUS BLD VENIPUNCTURE: CPT | Performed by: STUDENT IN AN ORGANIZED HEALTH CARE EDUCATION/TRAINING PROGRAM

## 2024-03-28 PROCEDURE — 86140 C-REACTIVE PROTEIN: CPT | Performed by: STUDENT IN AN ORGANIZED HEALTH CARE EDUCATION/TRAINING PROGRAM

## 2024-03-28 PROCEDURE — 63600175 PHARM REV CODE 636 W HCPCS: Performed by: STUDENT IN AN ORGANIZED HEALTH CARE EDUCATION/TRAINING PROGRAM

## 2024-03-28 PROCEDURE — 99285 EMERGENCY DEPT VISIT HI MDM: CPT | Mod: 25

## 2024-03-28 PROCEDURE — 85651 RBC SED RATE NONAUTOMATED: CPT | Performed by: STUDENT IN AN ORGANIZED HEALTH CARE EDUCATION/TRAINING PROGRAM

## 2024-03-28 PROCEDURE — 99447 NTRPROF PH1/NTRNET/EHR 11-20: CPT | Mod: ,,, | Performed by: PSYCHIATRY & NEUROLOGY

## 2024-03-28 PROCEDURE — 85025 COMPLETE CBC W/AUTO DIFF WBC: CPT | Performed by: STUDENT IN AN ORGANIZED HEALTH CARE EDUCATION/TRAINING PROGRAM

## 2024-03-28 PROCEDURE — 25000003 PHARM REV CODE 250: Performed by: STUDENT IN AN ORGANIZED HEALTH CARE EDUCATION/TRAINING PROGRAM

## 2024-03-28 PROCEDURE — 25500020 PHARM REV CODE 255: Performed by: STUDENT IN AN ORGANIZED HEALTH CARE EDUCATION/TRAINING PROGRAM

## 2024-03-28 PROCEDURE — 96374 THER/PROPH/DIAG INJ IV PUSH: CPT | Mod: 59

## 2024-03-28 PROCEDURE — 85610 PROTHROMBIN TIME: CPT | Performed by: STUDENT IN AN ORGANIZED HEALTH CARE EDUCATION/TRAINING PROGRAM

## 2024-03-28 RX ORDER — CLOPIDOGREL BISULFATE 75 MG/1
300 TABLET ORAL
Status: COMPLETED | OUTPATIENT
Start: 2024-03-28 | End: 2024-03-28

## 2024-03-28 RX ORDER — ACETAMINOPHEN 500 MG
1000 TABLET ORAL
Status: COMPLETED | OUTPATIENT
Start: 2024-03-28 | End: 2024-03-28

## 2024-03-28 RX ORDER — ASPIRIN 325 MG
325 TABLET ORAL
Status: COMPLETED | OUTPATIENT
Start: 2024-03-28 | End: 2024-03-28

## 2024-03-28 RX ORDER — ATORVASTATIN CALCIUM 40 MG/1
80 TABLET, FILM COATED ORAL
Status: COMPLETED | OUTPATIENT
Start: 2024-03-28 | End: 2024-03-28

## 2024-03-28 RX ORDER — ONDANSETRON HYDROCHLORIDE 2 MG/ML
4 INJECTION, SOLUTION INTRAVENOUS
Status: COMPLETED | OUTPATIENT
Start: 2024-03-28 | End: 2024-03-28

## 2024-03-28 RX ADMIN — CLOPIDOGREL BISULFATE 300 MG: 75 TABLET, FILM COATED ORAL at 12:03

## 2024-03-28 RX ADMIN — ASPIRIN 325 MG ORAL TABLET 325 MG: 325 PILL ORAL at 12:03

## 2024-03-28 RX ADMIN — ATORVASTATIN CALCIUM 80 MG: 40 TABLET, FILM COATED ORAL at 12:03

## 2024-03-28 RX ADMIN — IOHEXOL 100 ML: 350 INJECTION, SOLUTION INTRAVENOUS at 10:03

## 2024-03-28 RX ADMIN — ACETAMINOPHEN 1000 MG: 500 TABLET ORAL at 05:03

## 2024-03-28 RX ADMIN — ONDANSETRON 4 MG: 2 INJECTION INTRAMUSCULAR; INTRAVENOUS at 09:03

## 2024-03-28 NOTE — TELEMEDICINE CONSULT
[facility]  Vascular Neurology  Comprehensive Stroke Center  TeleVascular Neurology Interprofessional Consult Note           Consult Information  Consults    Consulting Provider:    Patient Location:  [facility] EMERGENCY DEPARTMENT     Summary of patient's symptoms:  Pt has a medical history significant for HLD and anxiety. Presented to the ED today with acute onset vertigo, imbalance, nausea, vomiting.       Images personally reviewed and interpreted:  Study: Head CT, CTA Head & Neck, and MRI Brain  Study Interpretation: CTH without acute abnormality. CTA brain/neck with short segment of the left vertebral artery stenosis at the C1 level involving the V3 segment. Similar subtle narrowing of the right vertebral artery at the C1 level also noted. Additionally there is short segment of right vertebral artery narrowing at the C5 level, V2 segment. The narrowing of the vertebral arteries at the C1 level, V3 segment could reflect thrombosed dissections.   MRI brain with question of tiny foci of restricted diffusion vermis.      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  Recommended medical management with [facility]AP[facility]. Will consider endovascular intervention if he fails medical management.   Follow up MRI in am.     I spent approximately 20 minutes on this encounter. More than half of that time was spent communicating with the consulting provider and coordinating patient care.     Signature  Robert Florian MD        This encounter was conducted as an interprofessional communication between providers at the [facility] and vascular neurologist. The interaction was completed over the phone or via secure messaging (electronic medical record - [facility] Secure Chat).     Once this note was completed, a written copy was sent back to the provider via fax or electronic medical record.

## 2024-03-28 NOTE — ED NOTES
Contacted MRI and asked for them to expedite his scan. MRI tech stated they have a patient right now but he will be next.

## 2024-03-28 NOTE — PHARMACY MED REC
"              .        Admission Medication History     The home medication history was taken by Caitlin Hernandez.    You may go to "Admission" then "Reconcile Home Medications" tabs to review and/or act upon these items.     The home medication list has been updated by the Pharmacy department.   Please read ALL comments highlighted in yellow.   Please address this information as you see fit.    Feel free to contact us if you have any questions or require assistance.          Medications listed below were obtained from: Patient/family and Analytic software- [employer]  No current facility-administered medications on file prior to encounter.     Current Outpatient Medications on File Prior to Encounter   Medication Sig Dispense Refill    ARIPiprazole (ABILIFY) 2 MG Tab TAKE 1 TABLET BY MOUTH ONCE DAILY. (Patient taking differently: Take 2 mg by mouth once daily.) 30 tablet 1    cetirizine (ZYRTEC) 10 MG tablet Take 10 mg by mouth daily as needed for Allergies or Rhinitis.      cysteine HCl (CYSTEINE, L-CYSTEINE,) 500 mg Cap Take 1 capsule by mouth once daily. Patient taking 600 MG BID      EPINEPHrine (EPIPEN) 0.3 mg/0.3 mL AtIn Inject 0.3 mLs (0.3 mg total) into the muscle once. for 1 dose 0.3 mL 1    fluticasone (FLONASE) 50 mcg/actuation nasal spray 1 spray by Each Nare route once daily.      fluvoxaMINE (LUVOX) 100 MG tablet Take 1 tablet (100 mg total) by mouth nightly. 30 tablet 1    meclizine (ANTIVERT) 12.5 mg tablet Take 1 tablet (12.5 mg total) by mouth 3 (three) times daily as needed for Dizziness or Nausea. 30 tablet 0    multivitamin (THERAGRAN) per tablet Take 1 tablet by mouth once daily.      omega-3 fatty acids/fish oil (FISH OIL-OMEGA-3 FATTY ACIDS) 300-1,000 mg capsule Take 1 capsule by mouth 2 (two) times a day.      rosuvastatin (CRESTOR) 10 MG tablet TAKE 1 TABLET BY MOUTH EVERY DAY (Patient taking differently: Take 10 mg by mouth every evening.) 90 tablet 0           Caitlin" David  [id number]

## 2024-03-28 NOTE — CONSULTS
[facility]  Neurology Consult    Patient Name: Denny Atkinson   MRN: [id number]  : 1985  TODAY'S DATE: 2024  ADMIT DATE: 3/28/2024  8:55 AM                                          CONSULTED PROVIDER: Nina Luu MD, Neurologist. On-call Phone: [phone]  CONSULT REQUESTED BY: Cristo Marcelo MD     Chief Complaint   Patient presents with    Dizziness     Pt states he felt a pop in his neck while sitting on the couch last night and since then has been dizzy and vomiting if he stands up more than 5 seconds        HPI per EMR:  39-year-old male with history of intermittent vertigo presenting with sudden onset vertigo that has been severe and persistent since 8:00 p.m. last night.  Patient states he was lying on the sofa and maybe turned his neck a little when the pain sudden.  Symptoms were sudden, severe, with nausea and vomiting.  Patient states he is dizzy at rest but gets much worse when trying to stand and ambulate.  Patient has been unable to ambulate unassisted due to severe vertigo since the onset at 8:00 p.m. last night.  Also reports associated photophobia and 2/10 headache.  He tried meclizine without relief.  Patient does report using a foam roller for neck massage on that side 2 hours prior to the onset of symptoms.  No other focal neuro deficits including no vision change, weakness, or numbness.     Neurology Consult:  Patient was seen and examined by me today.  He is a 39-year-old man with history of peripheral vertigo in the past, who presented to the emergency room for evaluation of sudden onset neck pain, vertigo, nausea and vomiting.  He reports that around 8:00 p.m. on 2024 he was laying down in a sofa and when he moved his head he felt a pop after which he immediately became severely dizzy with spinning sensation, had intense neck pain and headache associated to nausea and vomiting.  He also experienced double vision and could not ambulate when he tried  due to gait imbalance.  He states that this has never happened before, and he does not have a history of any known genetic disorders or vascular disease.  He is healthy, not a smoker, and was not taking any antiplatelet agents in the past.  He states that he has done some stretching exercises for his cervical spine but denies getting any chiropractor manipulations, whiplash injuries or other accidents.  Currently, he is still experiencing neck pain, headache as well as mild nausea but his symptoms have improved.  Neurological examination showed horizontal and vertical nystagmus, left leg dysmetria which was very mild and hyperreflexia.  He was evaluated via tele neurology for possible transfer to [facility], but they declined, so request for transfer was made to [facility] and patient was [place] for closer monitoring in neuro ICU and possible cerebral angiogram.    Review of Systems:    A comprehensive ROS was performed and all systems were negative except as noted above in HPI.    Past Medical History:  has a past medical history of Allergy and Anxiety.    Past Surgical History:  has a past surgical history that includes Finger surgery (2007) and Hernia repair (1985).    Family Medical History: family history includes Cancer in his paternal grandfather; Hyperlipidemia in his brother, father, and paternal grandfather; Hypertension in his brother, father, paternal grandfather, and paternal grandmother.    Social History:  reports that he quit smoking about 20 years ago. His smoking use included cigarettes. He has been exposed to tobacco smoke. He has never used smokeless tobacco. He reports that he does not drink alcohol and does not use drugs.    PCP: Arnie Woods MD    Allergies:   Review of patient's allergies indicates:   Allergen Reactions    Center-al house dust        Medications:   No current facility-administered medications on file prior to encounter.      Current Outpatient Medications on File Prior to Encounter   Medication Sig Dispense Refill    ARIPiprazole (ABILIFY) 2 MG Tab TAKE 1 TABLET BY MOUTH ONCE DAILY. (Patient taking differently: Take 2 mg by mouth once daily.) 30 tablet 1    cetirizine (ZYRTEC) 10 MG tablet Take 10 mg by mouth daily as needed for Allergies or Rhinitis.      cysteine HCl (CYSTEINE, L-CYSTEINE,) 500 mg Cap Take 1 capsule by mouth once daily. Patient taking 600 MG BID      EPINEPHrine (EPIPEN) 0.3 mg/0.3 mL AtIn Inject 0.3 mLs (0.3 mg total) into the muscle once. for 1 dose 0.3 mL 1    fluticasone (FLONASE) 50 mcg/actuation nasal spray 1 spray by Each Nare route once daily.      fluvoxaMINE (LUVOX) 100 MG tablet Take 1 tablet (100 mg total) by mouth nightly. 30 tablet 1    meclizine (ANTIVERT) 12.5 mg tablet Take 1 tablet (12.5 mg total) by mouth 3 (three) times daily as needed for Dizziness or Nausea. 30 tablet 0    multivitamin (THERAGRAN) per tablet Take 1 tablet by mouth once daily.      omega-3 fatty acids/fish oil (FISH OIL-OMEGA-3 FATTY ACIDS) 300-1,000 mg capsule Take 1 capsule by mouth 2 (two) times a day.      rosuvastatin (CRESTOR) 10 MG tablet TAKE 1 TABLET BY MOUTH EVERY DAY (Patient taking differently: Take 10 mg by mouth every evening.) 90 tablet 0       Physical Exam  Current Vitals:  Vitals:    03/28/24 1632   BP: 133/70   Pulse: 69   Resp: 15   Temp:        Physical Exam:  General: AO x4  HEENT: PERRL, EOMI  CV: RRR  Lungs: no respiratory distress  Abdomen: soft, non tender    Neurological Exam    MENTAL STATUS EXAM:  Level of alertness: Alert  Level of attention: Attentive w/out deficit  Orientation/Awareness: intact to person, place, time, situation  Language: fluent. Comprehension/repetition/naming intact    CRANIAL NERVE EXAM:  II/III: fundoscopic exam deferred, PERRL; visual fields full to confrontation  III/IV/VI: EOMI with horizontal and vertical nystagmus, no skew deviation or evoked diplopia  V: no deficits  appreciated to light touch  VII: no facial asymmetry noted  VIII: no deficits in hearing bilaterally  IX/X: palate @ ML and raises symmetrically  XI: shoulder shrug 5/5 bilaterally  XII: tongue to midline w/out asymmetry  No dysarthria noted on exam.    MOTOR EXAM:  Bulk and Tone: normal throughout  Strength is 5/5 proximally and distally in upper and lower extremities without deficits.  No pronator drift in bilateral UE. No tremor either at rest or with intention.    REFLEXES:  3+ in bilateral upper and lower extremities, bilateral Yoon's present, unsustained 2 beat clonus, present crossed adductors. Downgoing plantars.    SENSORY EXAM  Light touch intact throughout in upper and lower extremities without deficits.    COORDINATION/CEREBELLAR EXAM:  FTN: no dysmetria or other signs of appendicular ataxia  HTS: very mild left leg dysmetria    GAIT:  Deferred for safety.    NIH Stroke Scale      Date: 03/28/2024  Time: 1550  Person Administering Scale: Nina Luu MD    Administer stroke scale items in the order listed. Record performance in each category after each subscale exam. Do not go back and change scores. Follow directions provided for each exam technique. Scores should reflect what the patient does, not what the clinician thinks the patient can do. The clinician should record answers while administering the exam and work quickly. Except where indicated, the patient should not be coached (i.e., repeated requests to patient to make a special effort).      1a  Level of consciousness: 0=alert; keenly responsive   1b. LOC questions:  0=Answers both tasks correctly   1c. LOC commands: 0=Answers both tasks correctly   2.  Best Gaze: 0=normal   3.  Visual: 0=No visual loss   4. Facial Palsy: 0=Normal symmetric movement   5a.  Motor left arm: 0=No drift, limb holds 90 (or 45) degrees for full 10 seconds   5b.  Motor right arm: 0=No drift, limb holds 90 (or 45) degrees for full 10 seconds   6a. motor left  "le=No drift, limb holds 90 (or 45) degrees for full 10 seconds   6b  Motor right le=No drift, limb holds 90 (or 45) degrees for full 10 seconds   7. Limb Ataxia: 1=Present in one limb   8.  Sensory: 0=Normal; no sensory loss   9. Best Language:  0=No aphasia, normal   10. Dysarthria: 0=Normal   11. Extinction and Inattention: 0=No abnormality    Total:   1       Laboratory Data & Studies    Recent Labs   Lab 24  1020   WBC 7.93   HGB 15.7      MCV 89       Recent Labs   Lab 24  1020      K 4.0      CO2 26   BUN 14   *   CALCIUM 10.0       Recent Labs   Lab 24  1020   PROT 7.7   ALBUMIN 5.1   BILITOT 1.2*   AST 19   ALT 35   ALKPHOS 82       Recent Labs   Lab 24  1020   INR 0.9       No results for input(s): "HGBA1C", "CHOL", "TRIG", "LDLCALC", "HDL", "TSH" in the last 168 hours.    Microbiology:  Microbiology Results (last 7 days)       ** No results found for the last 168 hours. **            Imaging:  MRI Brain Without Contrast    Result Date: 3/28/2024  EXAMINATION: MRI BRAIN WITHOUT CONTRAST CLINICAL INDICATION: Male, 39 years old. Dizziness, persistent/recurrent, cardiac or vascular cause suspected TECHNIQUE: Multiplanar multisequence MR images of the brain were obtained without intravenous contrast. Unless otherwise specified, incidental findings do not require dedicated imaging follow-up. [id number]. COMPARISON: No prior exam. FINDINGS: INTRACRANIAL: Diffusion-weighted images show no acute or early subacute infarction. No abnormal brain parenchymal signal. The ventricles are normal in size and morphology. No augmented susceptibility. There is no mass effect or midline shift. No abnormal extraaxial fluid collection. VASCULATURE: Normal signal voids in the larger intracranial arteries and dural venous sinuses. SINUSES: The paranasal sinuses and mastoid air cells are predominantly clear. BONE: The marrow signal pattern is within normal limits. IMPRESSION: No " significant intracranial abnormalities. Electronically signed by:  Dale Abarca MD  03/28/2024 01:52 PM CDT Workstation: [id number]    CTA Head and Neck (xpd)    Result Date: 3/28/2024  EXAMINATION CMS MANDATED QUALITY DATA - CT RADIATION - 436 All CT scans at this facility utilize dose modulation, iterative reconstruction, and/or weight based dosing when appropriate to reduce radiation dose to as low as reasonably achievable. CMS MANDATED QUALITY DATA - CAROTID - 195 All measurements and percent stenosis described below were determined using NASCET criteria or criteria similar to NASCET, as defined by the Society of Radiologists in Ultrasound Consensus Conference, Radiology, 2003 Reason: No definite. Dizziness. TECHNIQUE: CT angiography of brain and neck with 100 mL Omnipaque 350. Maximum intensity projection coronal and sagittal reformations were obtained at a separate workstation and stored in the patient's permanent medical record. FINDINGS: The MCA, PA and PCA are patent. Diminutive right posterior communicating artery noted. [place] of [place] is otherwise within normal limits. Basilar artery is patent. There is a short segment of left vertebral artery stenosis at the C1 level (series 5 image 245) involving the V3 segment. There is similar subtle narrowing of the right vertebral artery at the C1 level. There is also focal narrowing of a short segment of right vertebral artery at the C5 level, V2 segment (series 5 image 164). Bilateral CCA and ICA are patent. The dural venous sinuses are patent. There is no acute intracranial abnormality observed. The neck soft tissues are within normal limits. There are no enlarged lymph nodes. No organized fluid collections observed. There is no acute osseous abnormality. Calvarium is intact. The visualized upper lungs are unremarkable. IMPRESSION: Short segment of the left vertebral artery stenosis at the C1 level involving the V3 segment. Similar subtle narrowing of the  right vertebral artery at the C1 level also noted. Additionally there is short segment of right vertebral artery narrowing at the C5 level, V2 segment. The narrowing of the vertebral arteries at the C1 level, V3 segment could reflect thrombosed dissections. Considering there are multifocal areas of vascular narrowing of the vertebral arteries, a vasculitis can also be considered. I discussed these findings with Dr. Cristo Marcelo at 11:47 AM. Electronically signed by:  Kain Tan DO  03/28/2024 11:48 AM CDT Workstation: [id number]    CT Head Without Contrast    Result Date: 3/28/2024  CMS MANDATED QUALITY DATA - CT RADIATION - 436 All CT scans at this facility utilize dose modulation, iterative reconstruction, and/or weight based dosing when appropriate to reduce radiation dose to as low as reasonably achievable. Reason: Dizziness, persistent/recurrent, cardiac or vascular cause suspected; Headache, sudden, severe Dizziness (Pt states he felt a pop in his neck while sitting on the couch last night and since then has been dizzy and vomiting if he stands up more than 5 seconds) TECHNIQUE: Head CT without IV contrast. COMPARISON: None FINDINGS: Gray-white differentiation is maintained without hemorrhage, midline shift, or mass effect. The ventricles and cisterns are maintained. Calvarium is intact. Visualized sinuses are clear. IMPRESSION: No acute intracranial abnormality. 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 He also experienced double vision and gait imbalance. Currently, he is still experiencing neck pain, headache  as well as mild nausea but his symptoms have improved.  Neurological examination showed horizontal and vertical nystagmus, left leg dysmetria which was very mild and hyperreflexia.  Transfer request was made to [facility] and patient was accepted to [facility] for closer monitoring in neuro ICU and possible cerebral angiogram.    Stroke workup:  CTH: no acute intracranial abnormality  CTA head and neck: bilateral vertebral artery dissection, worse on the left involving V3 and V4 segments. Right dissection only affecting V3 segment. Basilar artery is widely patent  MRI brain: small cerebellar vermis stroke  Risk factors: A1C, TSH, LDL    Plan:  - Patient received loading dose of  mg and plavix 300 mg, as well as atorvastatin 80 mg for secondary stroke prevention  - Since vertebral dissection is complicated by a small vermian infarct, patient will be transferred for Q1H neurochecks and continuous telemetry to neurocritical care unit for higher level of care and monitoring  - Will defer decision on possible cerebral angiogram to neurocritical care provider and interventional neurologist   - Risk factor stratification with HgbA1C, Fasting Lipid profile, TSH  - Maintain Euthermia with Tylenol prn temp > 37.2 degrees C.  - BP Goal <180/105 with cardene and/or labetalol PRN  - Patient may need outpatient genetic assessment in the setting of spontaneous vessel dissection  - Will follow while patient is in the ED        Stroke education was provided including stroke risk factors modification and any acute neurological changes including weakness, confusion, visual changes to come straight to the ER.     All questions were answered.                              Thank you kindly for including us in the care of this patient. Please do not hesitate to contact us with any questions.    Critical Care:  75 minutes of critical care time has been spent evaluating with the patient. Time includes chart review  not limited to diagnostic imaging, labs, and vitals, patient assessment, discussion with family and nursing, current order evaluations, and new order entries.      Nina Luu MD  Neurology

## 2024-03-28 NOTE — ED PROVIDER NOTES
Encounter Date: 3/28/2024       History     Chief Complaint   Patient presents with    Dizziness     Pt states he felt a pop in his neck while sitting on the couch last night and since then has been dizzy and vomiting if he stands up more than 5 seconds     HPI    39-year-old male with history of intermittent vertigo presenting with sudden onset vertigo that has been severe and persistent since 8:00 p.m. last night.  Patient states he was lying on the sofa and maybe turned his neck a little when the pain sudden.  Symptoms were sudden, severe, with nausea and vomiting.  Patient states he is dizzy at rest but gets much worse when trying to stand and ambulate.  Patient has been unable to ambulate unassisted due to severe vertigo since the onset at 8:00 p.m. last night.  Also reports associated photophobia and 2/10 headache.  He tried meclizine without relief.  Patient does report using a foam roller for neck massage on that side 2 hours prior to the onset of symptoms.  No other focal neuro deficits including no vision change, weakness, or numbness.    Review of patient's allergies indicates:   Allergen Reactions    Center-al house dust      Past Medical History:   Diagnosis Date    Allergy     Anxiety      Past Surgical History:   Procedure Laterality Date    FINGER SURGERY  2007    HERNIA REPAIR  1985     Family History   Problem Relation Age of Onset    Hypertension Father     Hyperlipidemia Father     Hypertension Brother     Hyperlipidemia Brother     Hypertension Paternal Grandmother     Cancer Paternal Grandfather     Hypertension Paternal Grandfather     Hyperlipidemia Paternal Grandfather      Social History     Tobacco Use    Smoking status: Former     Current packs/day: 0.00     Types: Cigarettes     Quit date:      Years since quittin.2     Passive exposure: Past    Smokeless tobacco: Never   Substance Use Topics    Alcohol use: No    Drug use: No     Review of Systems   All other systems reviewed  and are negative.      Physical Exam     Initial Vitals [03/28/24 0902]   BP Pulse Resp Temp SpO2   131/76 85 18 97.8 °F (36.6 °C) 100 %      MAP       --         Physical Exam    Constitutional: He appears well-developed and well-nourished.   HENT:   Head: Normocephalic and atraumatic.   Right ear with cerumen impaction   Eyes: Conjunctivae and EOM are normal. Pupils are equal, round, and reactive to light.   No persistent nystagmus or concerning test of skew   Neck:   Normal range of motion.  Cardiovascular:  Normal rate.           Pulmonary/Chest: Breath sounds normal. No respiratory distress. He has no wheezes.   Abdominal: Abdomen is soft. He exhibits no distension. There is no abdominal tenderness.   Musculoskeletal:         General: Normal range of motion.      Cervical back: Normal range of motion.     Neurological: He is alert and oriented to person, place, and time. He has normal strength.   Normal finger-to-nose coordination and heel-to-shin coordination.  Pupils equal round reactive, extraocular movements intact, no nystagmus noted.  Patient unable to sit up and stand due to severe vertigo.   Skin: Skin is warm and dry.         ED Course   Procedures  Labs Reviewed   CBC W/ AUTO DIFFERENTIAL - Abnormal; Notable for the following components:       Result Value    MCH 31.1 (*)     Gran % 81.6 (*)     Lymph % 12.6 (*)     All other components within normal limits   COMPREHENSIVE METABOLIC PANEL - Abnormal; Notable for the following components:    Glucose 148 (*)     Total Bilirubin 1.2 (*)     All other components within normal limits   PROTIME-INR   C-REACTIVE PROTEIN   SEDIMENTATION RATE   SEDIMENTATION RATE   C-REACTIVE PROTEIN   ISTAT CREATININE   POCT CREATININE          Imaging Results              MRI Brain Without Contrast (Final result)  Result time 03/28/24 13:52:33      Final result by Dale Abarca Jr., MD (03/28/24 13:52:33)                   Narrative:    EXAMINATION: MRI BRAIN WITHOUT  CONTRAST    CLINICAL INDICATION: Male, 39 years old. Dizziness, persistent/recurrent, cardiac or vascular cause suspected    TECHNIQUE: Multiplanar multisequence MR images of the brain were obtained without intravenous contrast. Unless otherwise specified, incidental findings do not require dedicated imaging follow-up. [id number].    COMPARISON: No prior exam.    FINDINGS:  INTRACRANIAL: Diffusion-weighted images show no acute or early subacute infarction. No abnormal brain parenchymal signal. The ventricles are normal in size and morphology. No augmented susceptibility. There is no mass effect or midline shift. No abnormal extraaxial fluid collection.    VASCULATURE: Normal signal voids in the larger intracranial arteries and dural venous sinuses.    SINUSES: The paranasal sinuses and mastoid air cells are predominantly clear.    BONE: The marrow signal pattern is within normal limits.    IMPRESSION:  No significant intracranial abnormalities.        Electronically signed by:  Dale Abarca MD  03/28/2024 01:52 PM CDT Workstation: [id number]                                     CTA Head and Neck (xpd) (Final result)  Result time 03/28/24 11:48:45      Final result by Kain Tan DO (03/28/24 11:48:45)                   Narrative:    EXAMINATION CMS MANDATED QUALITY DATA - CT RADIATION - 436    All CT scans at this facility utilize dose modulation, iterative reconstruction, and/or weight based dosing when appropriate to reduce radiation dose to as low as reasonably achievable.    CMS MANDATED QUALITY DATA - CAROTID - 195    All measurements and percent stenosis described below were determined using NASCET criteria or criteria similar to NASCET, as defined by the Society of Radiologists in Ultrasound Consensus Conference, Radiology, 2003        Reason: No definite. Dizziness.    TECHNIQUE: CT angiography of brain and neck with 100 mL Omnipaque 350. Maximum intensity projection coronal and sagittal reformations were  obtained at a separate workstation and stored in the patient's permanent medical record.    FINDINGS:    The MCA, PA and PCA are patent. Diminutive right posterior communicating artery noted. [place] is otherwise within normal limits. Basilar artery is patent. There is a short segment of left vertebral artery stenosis at the C1 level (series 5 image 245) involving the V3 segment. There is similar subtle narrowing of the right vertebral artery at the C1 level. There is also focal narrowing of a short segment of right vertebral artery at the C5 level, V2 segment (series 5 image 164). Bilateral CCA and ICA are patent. The dural venous sinuses are patent.    There is no acute intracranial abnormality observed. The neck soft tissues are within normal limits. There are no enlarged lymph nodes. No organized fluid collections observed.    There is no acute osseous abnormality. Calvarium is intact. The visualized upper lungs are unremarkable.    IMPRESSION:    Short segment of the left vertebral artery stenosis at the C1 level involving the V3 segment. Similar subtle narrowing of the right vertebral artery at the C1 level also noted. Additionally there is short segment of right vertebral artery narrowing at the C5 level, V2 segment. The narrowing of the vertebral arteries at the C1 level, V3 segment could reflect thrombosed dissections. Considering there are multifocal areas of vascular narrowing of the vertebral arteries, a vasculitis can also be considered. I discussed these findings with Dr. Cristo Marcelo at 11:47 AM.    Electronically signed by:  Kain Tan DO  03/28/2024 11:48 AM CDT Workstation: [id number]                                     CT Head Without Contrast (Final result)  Result time 03/28/24 11:21:12      Final result by Kain Tan DO (03/28/24 11:21:12)                   Narrative:    CMS MANDATED QUALITY DATA - CT RADIATION - 436    All CT scans at this facility utilize dose modulation,  iterative reconstruction, and/or weight based dosing when appropriate to reduce radiation dose to as low as reasonably achievable.        Reason: Dizziness, persistent/recurrent, cardiac or vascular cause suspected; Headache, sudden, severe Dizziness (Pt states he felt a pop in his neck while sitting on the couch last night and since then has been dizzy and vomiting if he stands up more than 5 seconds)    TECHNIQUE: Head CT without IV contrast.    COMPARISON: None    FINDINGS:    Gray-white differentiation is maintained without hemorrhage, midline shift, or mass effect.    The ventricles and cisterns are maintained.    Calvarium is intact. Visualized sinuses are clear.    IMPRESSION:    No acute intracranial abnormality.        Electronically signed by:  Kain Tan DO  03/28/2024 11:21 AM CDT Workstation: [id number]                                     Medications   ondansetron injection 4 mg (4 mg Intravenous Given 3/28/24 0948)   iohexoL (OMNIPAQUE 350) injection 100 mL (100 mLs Intravenous Given 3/28/24 1052)   aspirin tablet 325 mg (325 mg Oral Given 3/28/24 1230)   clopidogreL tablet 300 mg (300 mg Oral Given 3/28/24 1230)   atorvastatin tablet 80 mg (80 mg Oral Given 3/28/24 1230)   acetaminophen tablet 1,000 mg (1,000 mg Oral Given 3/28/24 1726)     Medical Decision Making  39-year-old male with history of paroxysmal vertigo presenting with sudden onset, severe, persistent vertigo with associated nausea and vomiting while lying down yesterday.  Vital signs here are stable.  On exam patient is alert.  No appreciable weakness, numbness, abnormal coordination.  Patient does have significant dizziness upon sitting up initially.  Given sudden onset and sensation of popping, vertebral artery dissection is high on my differential.  Acute stroke, BPPV, labyrinthitis also in the differential.  CT head and C-spine obtained with concern for vertebral artery dissection with thrombosis left greater than right.  MRI  obtained with possible small acute infarct in the vermis.  Neurology was consulted who recommended transfer to neuro critical care.  Patient was aspirin and Plavix loaded.  Given atorvastatin 80 mg.  Lab work otherwise unremarkable.  Patient will be transferred to [facility] neuro critical care unit by .  On reassessment patient's symptoms have gradually improved however still has some vertigo.  We will continue to monitor closely while patient remains in the emergency department.    Cristo Marcelo MD  Emergency Medicine      Amount and/or Complexity of Data Reviewed  Labs: ordered.  Radiology: ordered.    Risk  OTC drugs.  Prescription drug management.                                      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

## 2024-03-29 ENCOUNTER — PATIENT MESSAGE (OUTPATIENT)
Dept: INTERNAL MEDICINE | Facility: CLINIC | Age: 39
End: 2024-03-29
Payer: COMMERCIAL

## 2024-04-07 DIAGNOSIS — F42.9 OBSESSIVE-COMPULSIVE DISORDER, UNSPECIFIED TYPE: ICD-10-CM

## 2024-04-08 RX ORDER — ARIPIPRAZOLE 2 MG/1
2 TABLET ORAL
Qty: 30 TABLET | Refills: 1 | Status: SHIPPED | OUTPATIENT
Start: 2024-04-08 | End: 2024-06-03 | Stop reason: SDUPTHER

## 2024-04-17 ENCOUNTER — PATIENT MESSAGE (OUTPATIENT)
Dept: INTERNAL MEDICINE | Facility: CLINIC | Age: 39
End: 2024-04-17
Payer: COMMERCIAL

## 2024-05-16 ENCOUNTER — OFFICE VISIT (OUTPATIENT)
Dept: PSYCHIATRY | Facility: CLINIC | Age: 39
End: 2024-05-16
Payer: COMMERCIAL

## 2024-05-16 VITALS
BODY MASS INDEX: 29.52 KG/M2 | SYSTOLIC BLOOD PRESSURE: 114 MMHG | WEIGHT: 230.06 LBS | HEART RATE: 74 BPM | DIASTOLIC BLOOD PRESSURE: 75 MMHG | HEIGHT: 74 IN

## 2024-05-16 DIAGNOSIS — F42.9 OBSESSIVE-COMPULSIVE DISORDER, UNSPECIFIED TYPE: Primary | ICD-10-CM

## 2024-05-16 DIAGNOSIS — F41.1 GAD (GENERALIZED ANXIETY DISORDER): ICD-10-CM

## 2024-05-16 PROCEDURE — 99999 PR PBB SHADOW E&M-EST. PATIENT-LVL III: CPT | Mod: PBBFAC,,, | Performed by: PHYSICIAN ASSISTANT

## 2024-05-16 PROCEDURE — 3078F DIAST BP <80 MM HG: CPT | Mod: CPTII,S$GLB,, | Performed by: PHYSICIAN ASSISTANT

## 2024-05-16 PROCEDURE — 3074F SYST BP LT 130 MM HG: CPT | Mod: CPTII,S$GLB,, | Performed by: PHYSICIAN ASSISTANT

## 2024-05-16 PROCEDURE — 1159F MED LIST DOCD IN RCRD: CPT | Mod: CPTII,S$GLB,, | Performed by: PHYSICIAN ASSISTANT

## 2024-05-16 PROCEDURE — 3008F BODY MASS INDEX DOCD: CPT | Mod: CPTII,S$GLB,, | Performed by: PHYSICIAN ASSISTANT

## 2024-05-16 PROCEDURE — G2211 COMPLEX E/M VISIT ADD ON: HCPCS | Mod: S$GLB,,, | Performed by: PHYSICIAN ASSISTANT

## 2024-05-16 PROCEDURE — 99215 OFFICE O/P EST HI 40 MIN: CPT | Mod: S$GLB,,, | Performed by: PHYSICIAN ASSISTANT

## 2024-05-16 PROCEDURE — 1160F RVW MEDS BY RX/DR IN RCRD: CPT | Mod: CPTII,S$GLB,, | Performed by: PHYSICIAN ASSISTANT

## 2024-05-16 PROCEDURE — 3044F HG A1C LEVEL LT 7.0%: CPT | Mod: CPTII,S$GLB,, | Performed by: PHYSICIAN ASSISTANT

## 2024-05-16 RX ORDER — NAPROXEN SODIUM 220 MG/1
81 TABLET, FILM COATED ORAL DAILY
COMMUNITY

## 2024-05-16 RX ORDER — CLOPIDOGREL BISULFATE 75 MG/1
75 TABLET ORAL DAILY
COMMUNITY
End: 2024-06-17 | Stop reason: SDUPTHER

## 2024-05-16 RX ORDER — ATORVASTATIN CALCIUM 80 MG/1
80 TABLET, FILM COATED ORAL DAILY
COMMUNITY

## 2024-05-16 NOTE — PATIENT INSTRUCTIONS
Headspace - guided meditation    Please go to emergency department if feeling as though you are a harm to yourself or others or if you are in crisis. Please call the clinic to report any worsening of symptoms or problems associated with medication.    Barbra Acuna  imTT    Consider naltrexone    Consider increase of abilify

## 2024-05-16 NOTE — PROGRESS NOTES
"Outpatient Psychiatry Follow-Up Visit (MD/NP)    5/16/2024    Clinical Status of Patient:  Outpatient (Ambulatory)    Chief Complaint:  Denny Atkinson is a 39 y.o. male who presents today for follow-up of anxiety.  Met with patient.      Interval History and Content of Current Session:  Interim Events/Subjective Report/Content of Current Session:  Denny is seen today for medication follow-up.  We discussed recent hospitalization in which he states ultimately, to his understanding, he experienced a TIA.  Reviewed his records with him and there does appear to be some back and forth as to what occurred.  He states that he suffered this experience due essentially an injury, over stretching his neck, he states that he felt a pop and then saw stars with severe nausea and vertigo afterwards.  He has not yet had follow-up with Neurology, has follow-up in a couple of weeks.  Has continued on antiplatelet medications.  Statin was changed to more potent option.  Tolerating medication adjustments well.  Other stressors include having to put their 17-year-old dog to sleep.  His dad got a job in [place] so they moved to [facility].  They were previously in [place].  He is looking forward to visiting them but feels somewhat sad that they are no now so far away.  Currently on [employer].  Has a research cruise on June 6th.  He states that he is experiencing much more anxiety and has a difficult time getting his brain out of an "OCD loop".  States he has a difficult time moving onto the next task when he has successfully finished something, will go over the details of a previously completed tasks, sometimes loses up to a full workday.  He states he experiences this about once every 1-2 weeks.  We discussed potentially changing medications.  Would like to wait until he has had follow-up with Neurology.  He agrees.  Denies suicidal or homicidal ideation.  No other complaints today.    FROM PREVIOUS HPI   Denny is seen today for medication " follow-up.  He reports that he is doing well with addition of Abilify.  Has upcoming work engagements and we speak to this in detail.  He does feel like he has been tired and we discussed utilizing nighttime dosing of the medication.  Has introduced a bit more caffeine but has been tolerating well.  Would like to continue current medications as prescribed.  Denies suicidal or homicidal ideation.  No hallucinations or paranoia.  No other complaints today.        5/16/2024     8:24 AM 11/16/2023     8:31 AM 8/21/2023     8:28 AM   GAD7   1. Feeling nervous, anxious, or on edge? 2 1 1   2. Not being able to stop or control worrying? 2 1 1   3. Worrying too much about different things? 2 1 1   4. Trouble relaxing? 2 1 1   5. Being so restless that it is hard to sit still? 1 1 1   6. Becoming easily annoyed or irritable? 1 1 1   7. Feeling afraid as if something awful might happen? 0 0 0   8. If you checked off any problems, how difficult have these problems made it for you to do your work, take care of things at home, or get along with other people? 0 1 1   MAHESH-7 Score 10 6 6         5/16/2024   PHQ-9 Depression Patient Health Questionnaire   Over the last two weeks how often have you been bothered by little interest or pleasure in doing things 1   Over the last two weeks how often have you been bothered by feeling down, depressed or hopeless 1   Over the last two weeks how often have you been bothered by trouble falling or staying asleep, or sleeping too much 1   Over the last two weeks how often have you been bothered by feeling tired or having little energy 0   Over the last two weeks how often have you been bothered by a poor appetite or overeating 1   Over the last two weeks how often have you been bothered by feeling bad about yourself - or that you are a failure or have let yourself or your family down 0   Over the last two weeks how often have you been bothered by trouble concentrating on things, such as reading  the newspaper or watching television 1   Over the last two weeks how often have you been bothered by moving or speaking so slowly that other people could have noticed. 1   Over the last two weeks how often have you been bothered by thoughts that you would be better off dead, or of hurting yourself 0   PHQ-9 Score 6          Outpatient Psychiatry Initial Visit (MD/NP)     1/26/2022     Denny Atkinson, a 37 y.o. male, presenting for initial evaluation visit. Met with patient.     Reason for Encounter: Referral from Ben Dos Santos. Patient complains of OCD/anxiety.     History of Present Illness:   This is a 37-year-old male, past medical history of hyperlipidemia, who presents today for initial evaluation.  He reports that he is here due to significant amount of anxiety in particular obsessive-compulsive related symptoms.  He states he is obsessively worrying.  He is a  and manages many graduates students.  He reports this is a very high stress/high pressure job.  His wife is a professor as well.  He has a 2-year-old girl, this is their first child. He has been on escitalopram for many years.  He states that is been working pretty well but he still does endorse a significant amount of anxiety.  Has trouble with sleep.  He does state that he drinks too much coffee and also uses Nicorette gum is a stimulant.  He states he used to play basketball 3-4 nights per week prior to COVID and this helped him sleep much more.  He has been exercising some but it is not enough to help him with his sleep.  He states this was a major stress release.  He has struggled for a while with tasks at his job.  He endorses ruminating thoughts.  Will have detailed notes that he has to reread and add to them.  It is impacting his functioning at this time.  When he was younger, he used to do repetitive handwashing but is not doing this now.  He does find that his symptoms are impacting his day-to-day life.  Medications that  he has tried include bupropion, sertraline, fluoxetine, paroxetine.  He reports that he did have symptoms of serotonin toxicity during a cross taper of medications.  He states he is sensitive to medications.  Has not been on tricyclic antidepressants or Luvox.  Unsure about trazodone.  He previously participated in therapy for many years, interested in therapy referral today.  Discussed sleep hygiene in detail.  Denies suicidal or homicidal ideation.  No other complaints today.     Endorses prior problems with substance use disorders - does not want to be prescribed anything habit forming.     Depression symptoms:  Endorses difficulty with sleep, feeling tired or having little energy, overeating, trouble concentrating.  Denies suicidal ideation.  PHQ-9 seven, somewhat difficult     Anxiety symptoms:  GAD7 1/27/2022   1. Feeling nervous, anxious, or on edge? 1   2. Not being able to stop or control worrying? 3   3. Worrying too much about different things? 3   4. Trouble relaxing? 2   5. Being so restless that it is hard to sit still? 0   6. Becoming easily annoyed or irritable? 1   7. Feeling afraid as if something awful might happen? 0   8. If you checked off any problems, how difficult have these problems made it for you to do your work, take care of things at home, or get along with other people? 1   MAHESH-7 Score 10            Fina/Hypomania symptoms: denies  MDQ Scale 1/27/2022   you felt so good or so hyper that other people thought you were not your normal self or you were so hyper that you got into trouble? 0   you were so irritable that you shouted at people or started fights or arguments? 0   you felt much more self-confident than usual? 0   you got much less sleep than usual and found that you didn't really miss it? 0   you were more talkative or spoke much faster than usual? 0   thoughts raced through your head or you couldn't slow your mind down? 1   you were so easily distracted by things around you  that you had trouble concentrating or staying on track? 1   you had more energy than usual? 0   you were much more active or did many more things than usual? 0   you were much more social or outgoing than usual, for example, you telephoned friends in the middle of the night? 0   you were much more interested in sex than usual? 0   you did things that were unusual for you or that other people might have thought were excessive, foolish, or risky? 0   spending money got you or your family in trouble? 0   If you checked YES to more than one of the above, have several of these ever happened during the same period of time? 1   How much of a problem did any of these cause you - like being unable to work; having family, money or legal troubles; getting into arguments or fights? Moderate problem   Mood Disorder Questionnaire Score  2      Psychosis: denies     Attention/Concentration: fair     Body Image/Hx of eating disorders: denies, eating too much occasionally      Suicidal ideation and risk: denies suicidal thoughts, no access to guns, no hx of suicidal thoughts or attempts     Homicidal/Violient ideation and risk: denies     Sleep: poor sleep hygiene     Appetite: overeating     Past Psychiatric History:  Prior diagnoses: OCD, MAHESH, never been diagnosed with ADHD      Inpatient psychiatric treatment: denies     Outpatient psychiatric treatment: last saw psychiatrist, saw someone before Dr. Cast. Four or so years ago. Wasn't really thrilled. Was happy just on lexapro.      Prior medications: as described above     Current medications: lexapro 20mg daily     Prior suicide attempts: denies     Prior history self harm: denies     Prior psychotherapy: has participated in the past     Prior psychological testing: none     Allergies:       Review of patient's allergies indicates:   Allergen Reactions    Center-al house dust        Past Medical History:       Past Medical History:   Diagnosis Date    Allergy      Anxiety      Elevated cholesterol - not taking medicine      History TBI: denies  History seizures: denies     Past Surgical History:        Past Surgical History:   Procedure Laterality Date    FINGER SURGERY   2007    HERNIA REPAIR   1985      Family History:   Suicide: denies  Substance use: great grandfather   Bipolar disorder/Psychotic disorder: denies  Anxiety: brother, mom and dad  Depression: denies     Social History:  Childhood: born in [place]. Grew up in [place]. Went to college in [employer]. Raised by biological mother and father. Good relationship. They live in [place], [place]. One brother in [place] - younger brother. He's doing well. He has some anxiety, definitely not OCD.   Marital status: has been  for almost 10 years, supportive wife   Children: 2 year old daughter, August   Resides: in [place], [place]  Occupation: professor - geology   Hobbies: basketball, enjoys walking/hiking with the family, riding bikes, likes to be outside  Mormon: not religlious   Education level: PhD  : denies  Legal: denies  History of abuse/trauma: denies     Substance History:  Tobacco: nicorette gum - used to smoke cigarettes, quit smoking cigarettes probably 12 years ago. Gum - 3 or 4 pieces of day - at night.   Alcohol: no alcohol, when he was in high school would drink a lot. Has addictive personality.   Drug use: high school - opioids, not stimulants, used benzos, had to go treatment.   Caffeine: high caffeine - one of those yeti of coffee - make at home, community coffee      Rehab:  Prior/current AA: inpatient detox/rehab 20 years ago - several times between [age], before college     Review of Systems   PSYCHIATRIC: Pertinant items are noted in the narrative.  RESPIRATORY: No shortness of breath.  CARDIOVASCULAR: No tachycardia or chest pain.  GASTROINTESTINAL: No nausea, vomiting, pain, constipation or diarrhea.    Past Medical, Family and Social History: The patient's past medical, family and social history have been reviewed  "and updated as appropriate within the electronic medical record - see encounter notes.    Compliance: yes    Side effects: None    Risk Parameters:  Patient reports no suicidal ideation  Patient reports no homicidal ideation  Patient reports no self-injurious behavior  Patient reports no violent behavior    Exam (detailed: at least 9 elements; comprehensive: all 15 elements)   Constitutional  Vitals:  Most recent vital signs, dated less than 90 days prior to this appointment, were reviewed.   Vitals:    05/16/24 0827   BP: 114/75   Pulse: 74          General:  unremarkable, age appropriate     Musculoskeletal  Muscle Strength/Tone:  no dyskinesia   Gait & Station:  non-ataxic     Psychiatric  Speech:  no latency; no press   Mood & Affect:  "Anxious"  appropriate   Thought Process:  normal and logical   Associations:  intact   Thought Content:  normal, no suicidality, no homicidality, delusions, or paranoia   Insight:  intact   Judgement: behavior is adequate to circumstances   Orientation:  grossly intact   Memory: intact for content of interview   Language: grossly intact   Attention Span & Concentration:  able to focus   Fund of Knowledge:  intact and appropriate to age and level of education     Assessment and Diagnosis   Status/Progress: Based on the examination today, the patient's problem(s) is/are adequately but not ideally controlled.  New problems have not been presented today.   Co-morbidities, Diagnostic uncertainty, and Lack of compliance are not complicating management of the primary condition.      General Impression:   OCD with good insight  MAHESH     Intervention/Counseling/Treatment Plan   Medication Management: The risks and benefits of medication were discussed with the patient.  Counseling provided with patient as follows: importance of compliance with chosen treatment options was emphasized, risks and benefits of treatment options, including medications, were discussed with the patient, risk " factor reduction, prognosis    Denny is seen today for medication follow-up. Based on assessment:    Continue Luvox 100 mg nightly for OCD symptoms.  Continue aripiprazole 2 mg once daily for augmentation OCD symptoms.  Medication risks, side effects, benefits explained in detail. Discussed risks of tardive dyskinesia, drug induced parkinsonism, metabolic side effects, including weight gain, neuroleptic malignant syndrome   Trial headspace for guided meditation.   Referral to [facility] for imtt  Consider naltrexone  Consider alternate medications for ocd    Please go to emergency department if feeling as though you are a harm to yourself or others or if you are in crisis. Please call the clinic to report any worsening of symptoms or problems associated with medication.    Discussed with patient informed consent, risks vs. benefits, alternative treatments, side effect profile and the inherent unpredictability of individual responses to these treatments. The patient expresses understanding of the above and displays the capacity to agree with this current plan and had no other questions.    I spent 55 minutes with this client face to face on the day of service.     Return to Clinic: as scheduled, as needed

## 2024-06-03 ENCOUNTER — OFFICE VISIT (OUTPATIENT)
Dept: PSYCHIATRY | Facility: CLINIC | Age: 39
End: 2024-06-03
Payer: COMMERCIAL

## 2024-06-03 DIAGNOSIS — F41.1 GAD (GENERALIZED ANXIETY DISORDER): ICD-10-CM

## 2024-06-03 DIAGNOSIS — F42.9 OBSESSIVE-COMPULSIVE DISORDER, UNSPECIFIED TYPE: Primary | ICD-10-CM

## 2024-06-03 PROCEDURE — 99215 OFFICE O/P EST HI 40 MIN: CPT | Mod: S$GLB,,, | Performed by: PHYSICIAN ASSISTANT

## 2024-06-03 PROCEDURE — 99999 PR PBB SHADOW E&M-EST. PATIENT-LVL III: CPT | Mod: PBBFAC,,, | Performed by: PHYSICIAN ASSISTANT

## 2024-06-03 PROCEDURE — G2211 COMPLEX E/M VISIT ADD ON: HCPCS | Mod: S$GLB,,, | Performed by: PHYSICIAN ASSISTANT

## 2024-06-03 PROCEDURE — 3044F HG A1C LEVEL LT 7.0%: CPT | Mod: CPTII,S$GLB,, | Performed by: PHYSICIAN ASSISTANT

## 2024-06-03 PROCEDURE — 1159F MED LIST DOCD IN RCRD: CPT | Mod: CPTII,S$GLB,, | Performed by: PHYSICIAN ASSISTANT

## 2024-06-03 PROCEDURE — 1160F RVW MEDS BY RX/DR IN RCRD: CPT | Mod: CPTII,S$GLB,, | Performed by: PHYSICIAN ASSISTANT

## 2024-06-03 RX ORDER — FLUVOXAMINE MALEATE 100 MG/1
50 TABLET, COATED ORAL NIGHTLY
Start: 2024-06-03 | End: 2024-06-17

## 2024-06-03 RX ORDER — ARIPIPRAZOLE 2 MG/1
2 TABLET ORAL DAILY
Qty: 30 TABLET | Refills: 1 | Status: SHIPPED | OUTPATIENT
Start: 2024-06-03

## 2024-06-03 RX ORDER — SERTRALINE HYDROCHLORIDE 25 MG/1
25 TABLET, FILM COATED ORAL DAILY
Qty: 30 TABLET | Refills: 0 | Status: SHIPPED | OUTPATIENT
Start: 2024-06-03 | End: 2024-07-03

## 2024-06-03 NOTE — PROGRESS NOTES
Outpatient Psychiatry Follow-Up Visit (MD/NP)    6/3/2024    Clinical Status of Patient:  Outpatient (Ambulatory)    Chief Complaint:  Denny Atkinson is a 39 y.o. male who presents today for follow-up of anxiety.  Met with patient.      Interval History and Content of Current Session:  Interim Events/Subjective Report/Content of Current Session:  Denny is seen today for medication follow-up.  Discussed that he had follow-up for Neurology and will be continuing on anticoagulants at this time.  Discussed that with Luvox, we have had a couple of medication interactions due to the nature of Luvox (plavix and statin) and discussed trialing an alternative medication that would not have such interactions.  He has not been on sertraline for quite some time and is open to trialing this medication in addition to aripiprazole.  Work will be busy this summer but he is looking forward to it.  Has some day work trips in June and then will be going on a long term research study cruise later in the summer.  Plan from neurology visit is as below.    Plan from neuro on chart review:   check on med interaction for fluxoxamine and plavix  repeat imaging  continue DAPT x 6 months  reassess after imaging   referral to  to look for connective tissue disorder   RTC september with dr. boateng.     He does note that he has had much more generalized anxiety and comparison to just obsessive-compulsive symptoms.      FROM PREVIOUS HPI  Denny is seen today for medication follow-up.  We discussed recent hospitalization in which he states ultimately, to his understanding, he experienced a TIA.  Reviewed his records with him and there does appear to be some back and forth as to what occurred.  He states that he suffered this experience due essentially an injury, over stretching his neck, he states that he felt a pop and then saw stars with severe nausea and vertigo afterwards.  He has not yet had follow-up with Neurology, has follow-up in a  "couple of weeks.  Has continued on antiplatelet medications.  Statin was changed to more potent option.  Tolerating medication adjustments well.  Other stressors include having to put their 17-year-old dog to sleep.  His dad got a job in [place] so they moved to [facility].  They were previously in [place].  He is looking forward to visiting them but feels somewhat sad that they are no now so far away.  Currently on sabbatical.  Has a research cruise on June 6th.  He states that he is experiencing much more anxiety and has a difficult time getting his brain out of an "OCD loop".  States he has a difficult time moving onto the next task when he has successfully finished something, will go over the details of a previously completed tasks, sometimes loses up to a full workday.  He states he experiences this about once every 1-2 weeks.  We discussed potentially changing medications.  Would like to wait until he has had follow-up with Neurology.  He agrees.  Denies suicidal or homicidal ideation.  No other complaints today.          6/3/2024    12:55 PM 5/16/2024     8:24 AM 11/16/2023     8:31 AM   GAD7   1. Feeling nervous, anxious, or on edge? 1 2 1   2. Not being able to stop or control worrying? 1 2 1   3. Worrying too much about different things? 1 2 1   4. Trouble relaxing? 1 2 1   5. Being so restless that it is hard to sit still? 1 1 1   6. Becoming easily annoyed or irritable? 1 1 1   7. Feeling afraid as if something awful might happen? 0 0 0   8. If you checked off any problems, how difficult have these problems made it for you to do your work, take care of things at home, or get along with other people? 1 0 1   MAHESH-7 Score 6 10 6         6/3/2024   PHQ-9 Depression Patient Health Questionnaire   Over the last two weeks how often have you been bothered by little interest or pleasure in doing things 1   Over the last two weeks how often have you been bothered by feeling down, depressed or hopeless 1   Over the last " two weeks how often have you been bothered by trouble falling or staying asleep, or sleeping too much 1   Over the last two weeks how often have you been bothered by feeling tired or having little energy 1   Over the last two weeks how often have you been bothered by a poor appetite or overeating 1   Over the last two weeks how often have you been bothered by feeling bad about yourself - or that you are a failure or have let yourself or your family down 1   Over the last two weeks how often have you been bothered by trouble concentrating on things, such as reading the newspaper or watching television 1   Over the last two weeks how often have you been bothered by moving or speaking so slowly that other people could have noticed. 1   Over the last two weeks how often have you been bothered by thoughts that you would be better off dead, or of hurting yourself 1   PHQ-9 Score 9      He states that he accidentally marked the thoughts that he would be better off not here or of hurting himself. He denies plan or intent to harm himself.     Outpatient Psychiatry Initial Visit (MD/NP)     1/26/2022     Denny Atkinson, a 37 y.o. male, presenting for initial evaluation visit. Met with patient.     Reason for Encounter: Referral from Ben Dos Santos. Patient complains of OCD/anxiety.     History of Present Illness:   This is a 37-year-old male, past medical history of hyperlipidemia, who presents today for initial evaluation.  He reports that he is here due to significant amount of anxiety in particular obsessive-compulsive related symptoms.  He states he is obsessively worrying.  He is a  and manages many graduates students.  He reports this is a very high stress/high pressure job.  His wife is a professor as well.  He has a 2-year-old girl, this is their first child. He has been on escitalopram for many years.  He states that is been working pretty well but he still does endorse a significant amount of  anxiety.  Has trouble with sleep.  He does state that he drinks too much coffee and also uses Nicorette gum is a stimulant.  He states he used to play basketball 3-4 nights per week prior to COVID and this helped him sleep much more.  He has been exercising some but it is not enough to help him with his sleep.  He states this was a major stress release.  He has struggled for a while with tasks at his job.  He endorses ruminating thoughts.  Will have detailed notes that he has to reread and add to them.  It is impacting his functioning at this time.  When he was younger, he used to do repetitive handwashing but is not doing this now.  He does find that his symptoms are impacting his day-to-day life.  Medications that he has tried include bupropion, sertraline, fluoxetine, paroxetine.  He reports that he did have symptoms of serotonin toxicity during a cross taper of medications.  He states he is sensitive to medications.  Has not been on tricyclic antidepressants or Luvox.  Unsure about trazodone.  He previously participated in therapy for many years, interested in therapy referral today.  Discussed sleep hygiene in detail.  Denies suicidal or homicidal ideation.  No other complaints today.     Endorses prior problems with substance use disorders - does not want to be prescribed anything habit forming.     Depression symptoms:  Endorses difficulty with sleep, feeling tired or having little energy, overeating, trouble concentrating.  Denies suicidal ideation.  PHQ-9 seven, somewhat difficult     Anxiety symptoms:  GAD7 1/27/2022   1. Feeling nervous, anxious, or on edge? 1   2. Not being able to stop or control worrying? 3   3. Worrying too much about different things? 3   4. Trouble relaxing? 2   5. Being so restless that it is hard to sit still? 0   6. Becoming easily annoyed or irritable? 1   7. Feeling afraid as if something awful might happen? 0   8. If you checked off any problems, how difficult have these  problems made it for you to do your work, take care of things at home, or get along with other people? 1   MAHESH-7 Score 10            Fina/Hypomania symptoms: denies  MDQ Scale 1/27/2022   you felt so good or so hyper that other people thought you were not your normal self or you were so hyper that you got into trouble? 0   you were so irritable that you shouted at people or started fights or arguments? 0   you felt much more self-confident than usual? 0   you got much less sleep than usual and found that you didn't really miss it? 0   you were more talkative or spoke much faster than usual? 0   thoughts raced through your head or you couldn't slow your mind down? 1   you were so easily distracted by things around you that you had trouble concentrating or staying on track? 1   you had more energy than usual? 0   you were much more active or did many more things than usual? 0   you were much more social or outgoing than usual, for example, you telephoned friends in the middle of the night? 0   you were much more interested in sex than usual? 0   you did things that were unusual for you or that other people might have thought were excessive, foolish, or risky? 0   spending money got you or your family in trouble? 0   If you checked YES to more than one of the above, have several of these ever happened during the same period of time? 1   How much of a problem did any of these cause you - like being unable to work; having family, money or legal troubles; getting into arguments or fights? Moderate problem   Mood Disorder Questionnaire Score  2      Psychosis: denies     Attention/Concentration: fair     Body Image/Hx of eating disorders: denies, eating too much occasionally      Suicidal ideation and risk: denies suicidal thoughts, no access to guns, no hx of suicidal thoughts or attempts     Homicidal/Violient ideation and risk: denies     Sleep: poor sleep hygiene     Appetite: overeating     Past Psychiatric  History:  Prior diagnoses: OCD, MAHESH, never been diagnosed with ADHD      Inpatient psychiatric treatment: denies     Outpatient psychiatric treatment: last saw psychiatrist, saw someone before Dr. Cast. Four or so years ago. Wasn't really thrilled. Was happy just on lexapro.      Prior medications: as described above     Current medications: lexapro 20mg daily     Prior suicide attempts: denies     Prior history self harm: denies     Prior psychotherapy: has participated in the past     Prior psychological testing: none     Allergies:       Review of patient's allergies indicates:   Allergen Reactions    Center-al house dust        Past Medical History:       Past Medical History:   Diagnosis Date    Allergy      Anxiety     Elevated cholesterol - not taking medicine      History TBI: denies  History seizures: denies     Past Surgical History:        Past Surgical History:   Procedure Laterality Date    FINGER SURGERY   2007    HERNIA REPAIR   1985      Family History:   Suicide: denies  Substance use: great grandfather   Bipolar disorder/Psychotic disorder: denies  Anxiety: brother, mom and dad  Depression: denies     Social History:  Childhood: born in [place]. Grew up in [place]. Went to college in [employer]. Raised by biological mother and father. Good relationship. They live in [place], [place]. One brother in [place] - younger brother. He's doing well. He has some anxiety, definitely not OCD.   Marital status: has been  for almost 10 years, supportive wife   Children: 2 year old daughter, August   Resides: in [place], [place]  Occupation: professor - geology   Hobbies: basketball, enjoys walking/hiking with the family, riding bikes, likes to be outside  Roman Catholic: not religlious   Education level: PhD  : denies  Legal: denies  History of abuse/trauma: denies     Substance History:  Tobacco: nicorette gum - used to smoke cigarettes, quit smoking cigarettes probably 12 years ago. Gum - 3 or 4 pieces of day - at night.    Alcohol: no alcohol, when he was in high school would drink a lot. Has addictive personality.   Drug use: high school - opioids, not stimulants, used benzos, had to go treatment.   Caffeine: high caffeine - one of those yeti of coffee - make at home, community coffee      Rehab:  Prior/current AA: inpatient detox/rehab 20 years ago - several times between [age], before college     Review of Systems   PSYCHIATRIC: Pertinant items are noted in the narrative.  RESPIRATORY: No shortness of breath.  CARDIOVASCULAR: No tachycardia or chest pain.  GASTROINTESTINAL: No nausea, vomiting, pain, constipation or diarrhea.    Past Medical, Family and Social History: The patient's past medical, family and social history have been reviewed and updated as appropriate within the electronic medical record - see encounter notes.      Risk Parameters:  Patient reports no suicidal ideation  Patient reports no homicidal ideation  Patient reports no self-injurious behavior  Patient reports no violent behavior    Exam (detailed: at least 9 elements; comprehensive: all 15 elements)   Constitutional  Vitals:  Most recent vital signs, dated less than 90 days prior to this appointment, were reviewed.     Vitals were taken but do not appear to be documented - will have medical assistant update vitals.      General:  unremarkable, age appropriate     Musculoskeletal  Muscle Strength/Tone:  no dyskinesia   Gait & Station:  non-ataxic     Psychiatric  Speech:  no latency; no press   Mood & Affect:  anxious  mood-congruent   Thought Process:  normal and logical   Associations:  intact   Thought Content:  normal, no suicidality, no homicidality, delusions, or paranoia   Insight:  intact   Judgement: behavior is adequate to circumstances   Orientation:  grossly intact   Memory: intact for content of interview   Language: grossly intact   Attention Span & Concentration:  able to focus   Fund of Knowledge:  intact and appropriate to age and level of  education     Assessment and Diagnosis   Status/Progress: Based on the examination today, the patient's problem(s) is/are adequately but not ideally controlled.  New problems have not been presented today.   Co-morbidities, Diagnostic uncertainty, and Lack of compliance are not complicating management of the primary condition.      General Impression:   OCD with good insight  MAHESH     Intervention/Counseling/Treatment Plan   Medication Management: The risks and benefits of medication were discussed with the patient.  Counseling provided with patient as follows: importance of compliance with chosen treatment options was emphasized, risks and benefits of treatment options, including medications, were discussed with the patient, risk factor reduction, prognosis    Denny is seen today for medication follow-up. Based on assessment:    Reduce Luvox to 50 mg once daily for seven days then discontinue the medication due to inefficacy and medication interactions.  Continue aripiprazole 2 mg once daily for augmentation OCD symptoms.  Medication risks, side effects, benefits explained in detail. Discussed risks of tardive dyskinesia, drug induced parkinsonism, metabolic side effects, including weight gain, neuroleptic malignant syndrome   Once Luvox has been discontinued, trialed sertraline 25 mg once daily to be titrated to effect for mood/anxiety.  Trial headspace for guided meditation.   Referral to [facility] Ac[facility] for imtt  Provided information on [facility] in [place].  Consider naltrexone    Please go to emergency department if feeling as though you are a harm to yourself or others or if you are in crisis. Please call the clinic to report any worsening of symptoms or problems associated with medication.    Discussed with patient informed consent, risks vs. benefits, alternative treatments, side effect profile and the inherent unpredictability of individual responses to these treatments. The patient expresses  understanding of the above and displays the capacity to agree with this current plan and had no other questions.    I spent 45 minutes with this client face to face on the day of service.     Return to Clinic: as scheduled, as needed

## 2024-06-03 NOTE — PATIENT INSTRUCTIONS
[email or web address] - [place]     Please go to emergency department if feeling as though you are a harm to yourself or others or if you are in crisis. Please call the clinic to report any worsening of symptoms or problems associated with medication.    Naltrexone - look into this     Reduce luvox to 50mg once daily for 7 days then stop the medication.  Once luvox has been discontinued, trial sertraline (zoloft) 25mg once daily.

## 2024-06-04 VITALS
HEART RATE: 86 BPM | BODY MASS INDEX: 30.1 KG/M2 | WEIGHT: 234.44 LBS | DIASTOLIC BLOOD PRESSURE: 79 MMHG | SYSTOLIC BLOOD PRESSURE: 119 MMHG

## 2024-06-17 ENCOUNTER — OFFICE VISIT (OUTPATIENT)
Dept: FAMILY MEDICINE | Facility: CLINIC | Age: 39
End: 2024-06-17
Payer: COMMERCIAL

## 2024-06-17 ENCOUNTER — LAB VISIT (OUTPATIENT)
Dept: LAB | Facility: HOSPITAL | Age: 39
End: 2024-06-17
Attending: STUDENT IN AN ORGANIZED HEALTH CARE EDUCATION/TRAINING PROGRAM
Payer: COMMERCIAL

## 2024-06-17 VITALS
SYSTOLIC BLOOD PRESSURE: 110 MMHG | TEMPERATURE: 98 F | HEIGHT: 74 IN | OXYGEN SATURATION: 97 % | BODY MASS INDEX: 29.71 KG/M2 | HEART RATE: 77 BPM | WEIGHT: 231.5 LBS | DIASTOLIC BLOOD PRESSURE: 64 MMHG | RESPIRATION RATE: 16 BRPM

## 2024-06-17 DIAGNOSIS — Z86.73 HX-TIA (TRANSIENT ISCHEMIC ATTACK): ICD-10-CM

## 2024-06-17 DIAGNOSIS — Z00.00 HEALTHCARE MAINTENANCE: Primary | ICD-10-CM

## 2024-06-17 DIAGNOSIS — F41.1 GAD (GENERALIZED ANXIETY DISORDER): ICD-10-CM

## 2024-06-17 DIAGNOSIS — R79.9 ABNORMAL FINDING OF BLOOD CHEMISTRY, UNSPECIFIED: ICD-10-CM

## 2024-06-17 DIAGNOSIS — Z00.00 ENCOUNTER FOR PREVENTIVE HEALTH EXAMINATION: ICD-10-CM

## 2024-06-17 DIAGNOSIS — E78.2 MIXED HYPERLIPIDEMIA: ICD-10-CM

## 2024-06-17 DIAGNOSIS — R73.03 PREDIABETES: ICD-10-CM

## 2024-06-17 LAB
ESTIMATED AVG GLUCOSE: 114 MG/DL (ref 68–131)
FOLATE SERPL-MCNC: 9.6 NG/ML (ref 4–24)
HBA1C MFR BLD: 5.6 % (ref 4–5.6)
HCYS SERPL-SCNC: 7 UMOL/L (ref 4–16.5)

## 2024-06-17 PROCEDURE — 82607 VITAMIN B-12: CPT | Performed by: STUDENT IN AN ORGANIZED HEALTH CARE EDUCATION/TRAINING PROGRAM

## 2024-06-17 PROCEDURE — 3078F DIAST BP <80 MM HG: CPT | Mod: CPTII,S$GLB,, | Performed by: STUDENT IN AN ORGANIZED HEALTH CARE EDUCATION/TRAINING PROGRAM

## 2024-06-17 PROCEDURE — 83036 HEMOGLOBIN GLYCOSYLATED A1C: CPT | Performed by: STUDENT IN AN ORGANIZED HEALTH CARE EDUCATION/TRAINING PROGRAM

## 2024-06-17 PROCEDURE — 83090 ASSAY OF HOMOCYSTEINE: CPT | Performed by: STUDENT IN AN ORGANIZED HEALTH CARE EDUCATION/TRAINING PROGRAM

## 2024-06-17 PROCEDURE — 3044F HG A1C LEVEL LT 7.0%: CPT | Mod: CPTII,S$GLB,, | Performed by: STUDENT IN AN ORGANIZED HEALTH CARE EDUCATION/TRAINING PROGRAM

## 2024-06-17 PROCEDURE — 3074F SYST BP LT 130 MM HG: CPT | Mod: CPTII,S$GLB,, | Performed by: STUDENT IN AN ORGANIZED HEALTH CARE EDUCATION/TRAINING PROGRAM

## 2024-06-17 PROCEDURE — 99395 PREV VISIT EST AGE 18-39: CPT | Mod: 25,S$GLB,, | Performed by: STUDENT IN AN ORGANIZED HEALTH CARE EDUCATION/TRAINING PROGRAM

## 2024-06-17 PROCEDURE — 1159F MED LIST DOCD IN RCRD: CPT | Mod: CPTII,S$GLB,, | Performed by: STUDENT IN AN ORGANIZED HEALTH CARE EDUCATION/TRAINING PROGRAM

## 2024-06-17 PROCEDURE — 99999 PR PBB SHADOW E&M-EST. PATIENT-LVL IV: CPT | Mod: PBBFAC,,, | Performed by: STUDENT IN AN ORGANIZED HEALTH CARE EDUCATION/TRAINING PROGRAM

## 2024-06-17 PROCEDURE — 82746 ASSAY OF FOLIC ACID SERUM: CPT | Performed by: STUDENT IN AN ORGANIZED HEALTH CARE EDUCATION/TRAINING PROGRAM

## 2024-06-17 PROCEDURE — 83921 ORGANIC ACID SINGLE QUANT: CPT | Performed by: STUDENT IN AN ORGANIZED HEALTH CARE EDUCATION/TRAINING PROGRAM

## 2024-06-17 PROCEDURE — 3008F BODY MASS INDEX DOCD: CPT | Mod: CPTII,S$GLB,, | Performed by: STUDENT IN AN ORGANIZED HEALTH CARE EDUCATION/TRAINING PROGRAM

## 2024-06-17 RX ORDER — CLOPIDOGREL BISULFATE 75 MG/1
75 TABLET ORAL DAILY
Qty: 90 TABLET | Refills: 1 | Status: SHIPPED | OUTPATIENT
Start: 2024-06-17 | End: 2024-09-15

## 2024-06-17 NOTE — PROGRESS NOTES
[facility] Note    Subjective:    The HPI and pertinent ROS is included in the Diagnostic Impression Remarks section at the end of the note. Please see below for further details. Chief complaint is at end of note.     Denny is a pleasant intelligent patient who is here for evaluation.     Modified Medications    Modified Medication Previous Medication    CLOPIDOGREL (PLAVIX) 75 MG TABLET clopidogreL (PLAVIX) 75 mg tablet       Take 1 tablet (75 mg total) by mouth once daily.    Take 75 mg by mouth once daily.       Data reviewed    274}  Previous medical records reviewed and summarized in plan section at end of note.      If you are due for any health screening(s) below please notify me so we can arrange them to be ordered and scheduled. Most healthy patients at your age complete them, but you are free to accept or refuse. If you can't do it, I'll definitely understand. If you can, I'd certainly appreciate it!     All of your core healthy metrics are met.      The following portions of the patient's history were reviewed and updated as appropriate: allergies, current medications, past family history, past medical history, past social history, past surgical history and problem list.    He  has a past medical history of Allergy and Anxiety.  He  has a past surgical history that includes Finger surgery (2007) and Hernia repair (1985).    He  reports that he quit smoking about 20 years ago. His smoking use included cigarettes. He has been exposed to tobacco smoke. He has never used smokeless tobacco. He reports that he does not drink alcohol and does not use drugs.  He family history includes Cancer in his paternal grandfather; Hyperlipidemia in his brother, father, and paternal grandfather; Hypertension in his brother, father, paternal grandfather, and paternal grandmother.    Review of patient's allergies indicates:   Allergen Reactions    Center-al house dust        Tobacco Use: Medium Risk  "(6/17/2024)    Patient History     Smoking Tobacco Use: Former     Smokeless Tobacco Use: Never     Passive Exposure: Past     Physical Examination  General appearance: alert, cooperative, no distress  Neck: no thyromegaly, no neck stiffness  Lungs: clear to auscultation, no wheezes, rales or rhonchi, symmetric air entry  Heart: normal rate, regular rhythm, normal S1, S2, no murmurs, rubs, clicks or gallops  Abdomen: soft, nontender, nondistended, no rigidity, rebound, or guarding.   Back: no point tenderness over spine  Extremities: peripheral pulses normal, no unilateral leg swelling or calf tenderness   Neurological:alert, oriented, normal speech, no new focal findings or movement disorder noted from baseline    BP Readings from Last 3 Encounters:   06/17/24 110/64   06/04/24 119/79   05/16/24 114/75     Wt Readings from Last 3 Encounters:   06/17/24 105 kg (231 lb 7.7 oz)   06/04/24 106.4 kg (234 lb 7.4 oz)   05/16/24 104.3 kg (230 lb 0.8 oz)     /64 (BP Location: Right arm, Patient Position: Sitting, BP Method: Large (Manual))   Pulse 77   Temp 97.9 °F (36.6 °C) (Oral)   Resp 16   Ht 6' 2" (1.88 m)   Wt 105 kg (231 lb 7.7 oz)   SpO2 97%   BMI 29.72 kg/m²       274}  Laboratory: I have reviewed old labs below:       274}    Lab Results   Component Value Date    WBC 7.93 03/28/2024    HGB 15.7 03/28/2024    HCT 45.1 03/28/2024    MCV 89 03/28/2024     03/28/2024     03/28/2024    K 4.0 03/28/2024     03/28/2024    CALCIUM 10.0 03/28/2024    CO2 26 03/28/2024     (H) 03/28/2024    BUN 14 03/28/2024    CREATININE 0.9 03/28/2024    EGFRNORACEVR >60.0 03/28/2024    ANIONGAP 10 03/28/2024    PROT 7.7 03/28/2024    ALBUMIN 5.1 03/28/2024    BILITOT 1.2 (H) 03/28/2024    ALKPHOS 82 03/28/2024    ALT 35 03/28/2024    AST 19 03/28/2024    INR 1.0 03/29/2024    CHOL 148 03/29/2024    TRIG 113 03/29/2024    HDL 39 (L) 03/29/2024    LDLCALC 86 03/29/2024    TSH 1.52 03/29/2024    GLUF " "92 02/18/2019    HGBA1C 5.7 (H) 03/29/2024      Lab reviewed by me: Particular labs of significance that I will monitor, workup, or treat to improve are mentioned below in diagnostic impression remarks.    Imaging/EKG: I have reviewed the pertinent results and my findings are noted in remarks.     274}    CC:   Chief Complaint   Patient presents with    Annual Exam           274}    Assessment/Plan  Denny Atkinson is a 39 y.o. male who presents to clinic with:  1. Healthcare maintenance    2. Encounter for preventive health examination    3. Mixed hyperlipidemia    4. MAHESH (generalized anxiety disorder)    5. Hx-TIA (transient ischemic attack)    6. Abnormal finding of blood chemistry, unspecified    7. Prediabetes          274}  Diagnostic Impression Remarks + HPI     Documentation entered by me for this encounter may have been done in part using speech-recognition technology. Although I have made an effort to ensure accuracy, "sound like" errors may exist and should be interpreted in context.     Preventive-will get blood work and follow-up on this doing well.  HLD -stable continue current meds monitor blood work and recommend healthy diet.   TIA-patient had history of TIA is on antiplt and statin continue has follow up with neuro had a B12 on low end will get MMA   MAHESH -continue current meds monitor   Prediabetes- recommend low glycemic index diet monitor blood work rec to stay active       This is the extent of this pleasant patient's concerns at this present time. He did not feel chest pain upon exertion, dyspnea, nausea, vomiting, diaphoresis, or syncope. No pleuritic chest pain, unilateral leg swelling, calf tenderness, or calf pain. Negative for unintentional weight loss night sweats, hematuria, and fevers.     Additional workup planned: see labs ordered below.    See below for labs and meds ordered with associated diagnosis      1. Healthcare maintenance    2. Encounter for preventive health " examination    3. Mixed hyperlipidemia    4. MAHESH (generalized anxiety disorder)    5. Hx-TIA (transient ischemic attack)  - clopidogreL (PLAVIX) 75 mg tablet; Take 1 tablet (75 mg total) by mouth once daily.  Dispense: 90 tablet; Refill: 1    6. Abnormal finding of blood chemistry, unspecified  - METHYLMALONIC ACID, SERUM; Future  - HOMOCYSTEINE, SERUM; Future  - Vitamin B12 Deficiency Panel; Future  - Folate; Future  - Hemoglobin A1C; Future    7. Prediabetes      Arnie Woods MD      274}    If you are due for any health screening(s) below please notify me so we can arrange them to be ordered and scheduled. Most healthy patients at your age complete them, but you are free to accept or refuse.     If you can't do it, I'll definitely understand. If you can, I'd certainly appreciate it!   All of your core healthy metrics are met.

## 2024-06-18 ENCOUNTER — PATIENT MESSAGE (OUTPATIENT)
Dept: PSYCHIATRY | Facility: CLINIC | Age: 39
End: 2024-06-18
Payer: COMMERCIAL

## 2024-06-18 DIAGNOSIS — F42.9 OBSESSIVE-COMPULSIVE DISORDER, UNSPECIFIED TYPE: ICD-10-CM

## 2024-06-19 LAB — VIT B12 SERPL-MCNC: 314 NG/L (ref 180–914)

## 2024-06-21 LAB
METHYLMALONATE SERPL-SCNC: 0.14 NMOL/ML
METHYLMALONATE SERPL-SCNC: 0.15 UMOL/L

## 2024-06-25 RX ORDER — SERTRALINE HYDROCHLORIDE 50 MG/1
50 TABLET, FILM COATED ORAL DAILY
Qty: 30 TABLET | Refills: 0 | Status: SHIPPED | OUTPATIENT
Start: 2024-06-25 | End: 2024-07-25

## 2024-06-25 NOTE — TELEPHONE ENCOUNTER
Please call Denny to see how he is doing with Zoloft - I sent a Medxnote message but he has not read it. Thank you.

## 2024-06-25 NOTE — TELEPHONE ENCOUNTER
Spoke with pt. He states he is doing well and is interested in increasing the sertraline to 50 mg daily.

## 2024-07-17 DIAGNOSIS — F42.9 OBSESSIVE-COMPULSIVE DISORDER, UNSPECIFIED TYPE: ICD-10-CM

## 2024-07-17 RX ORDER — SERTRALINE HYDROCHLORIDE 50 MG/1
50 TABLET, FILM COATED ORAL
Qty: 90 TABLET | Refills: 0 | Status: SHIPPED | OUTPATIENT
Start: 2024-07-17

## 2024-07-31 ENCOUNTER — OFFICE VISIT (OUTPATIENT)
Dept: PSYCHIATRY | Facility: CLINIC | Age: 39
End: 2024-07-31
Payer: COMMERCIAL

## 2024-07-31 VITALS
WEIGHT: 230.69 LBS | DIASTOLIC BLOOD PRESSURE: 77 MMHG | SYSTOLIC BLOOD PRESSURE: 118 MMHG | BODY MASS INDEX: 29.61 KG/M2 | HEART RATE: 73 BPM | HEIGHT: 74 IN

## 2024-07-31 DIAGNOSIS — F41.1 GAD (GENERALIZED ANXIETY DISORDER): ICD-10-CM

## 2024-07-31 DIAGNOSIS — F42.9 OBSESSIVE-COMPULSIVE DISORDER, UNSPECIFIED TYPE: Primary | ICD-10-CM

## 2024-07-31 PROCEDURE — 3074F SYST BP LT 130 MM HG: CPT | Mod: CPTII,S$GLB,, | Performed by: PHYSICIAN ASSISTANT

## 2024-07-31 PROCEDURE — 1160F RVW MEDS BY RX/DR IN RCRD: CPT | Mod: CPTII,S$GLB,, | Performed by: PHYSICIAN ASSISTANT

## 2024-07-31 PROCEDURE — 99999 PR PBB SHADOW E&M-EST. PATIENT-LVL III: CPT | Mod: PBBFAC,,, | Performed by: PHYSICIAN ASSISTANT

## 2024-07-31 PROCEDURE — 3078F DIAST BP <80 MM HG: CPT | Mod: CPTII,S$GLB,, | Performed by: PHYSICIAN ASSISTANT

## 2024-07-31 PROCEDURE — 3008F BODY MASS INDEX DOCD: CPT | Mod: CPTII,S$GLB,, | Performed by: PHYSICIAN ASSISTANT

## 2024-07-31 PROCEDURE — 1159F MED LIST DOCD IN RCRD: CPT | Mod: CPTII,S$GLB,, | Performed by: PHYSICIAN ASSISTANT

## 2024-07-31 PROCEDURE — 3044F HG A1C LEVEL LT 7.0%: CPT | Mod: CPTII,S$GLB,, | Performed by: PHYSICIAN ASSISTANT

## 2024-07-31 PROCEDURE — G2211 COMPLEX E/M VISIT ADD ON: HCPCS | Mod: S$GLB,,, | Performed by: PHYSICIAN ASSISTANT

## 2024-07-31 PROCEDURE — 99214 OFFICE O/P EST MOD 30 MIN: CPT | Mod: S$GLB,,, | Performed by: PHYSICIAN ASSISTANT

## 2024-07-31 RX ORDER — SERTRALINE HYDROCHLORIDE 50 MG/1
100 TABLET, FILM COATED ORAL DAILY
Start: 2024-07-31

## 2024-07-31 RX ORDER — ARIPIPRAZOLE 2 MG/1
2 TABLET ORAL DAILY
Qty: 30 TABLET | Refills: 1 | Status: SHIPPED | OUTPATIENT
Start: 2024-07-31

## 2024-07-31 NOTE — PROGRESS NOTES
Outpatient Psychiatry Follow-Up Visit (MD/NP)    7/31/2024    Clinical Status of Patient:  Outpatient (Ambulatory)    Chief Complaint:  Denny Atkinson is a 39 y.o. male who presents today for follow-up of anxiety.  Met with patient.      Interval History and Content of Current Session:  Interim Events/Subjective Report/Content of Current Session:  Denny is seen today for medication follow-up.  She reports he has been able to notice a change for the worse with utilizing sertraline instead of Luvox for OCD type symptoms.  He will have neurology follow-up in early September and ideally would be able to discontinue blood thinners at that time.  If he is cleared, we could look at resuming Luvox for OCD type symptoms especially since he has noticed the benefit of the medication.  Discuss upcoming work stressors.  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  Denies suicidal or homicidal ideation.  No other complaints today.    IMTT - with Barbra Oquendo.   [phone] - call her to coordinate     FROM PREVIOUS HPI  Denny is seen today for medication follow-up.  Discussed that he had follow-up for Neurology and will be continuing on anticoagulants at this time.  Discussed that with Luvox, we have had a couple of medication interactions due to the nature of Luvox (plavix and statin) and discussed trialing an alternative medication that would not have such interactions.  He has not been on sertraline for quite some time and is open to trialing this medication in addition to aripiprazole.  Work will be busy this summer but he is looking forward to it.  Has some day work trips in June and then will be going on a long term research study cruise later in the summer.  Plan from neurology visit is as below.    Plan from neuro on chart review:   check on med interaction for fluxoxamine and plavix  repeat imaging  continue DAPT x 6 months  reassess after imaging   referral to  to look  for connective tissue disorder   RTC september with dr. boateng.     He does note that he has had much more generalized anxiety and comparison to just obsessive-compulsive symptoms.          7/31/2024     8:00 AM 6/3/2024    12:55 PM 5/16/2024     8:24 AM   GAD7   1. Feeling nervous, anxious, or on edge? 2 1 2   2. Not being able to stop or control worrying? 2 1 2   3. Worrying too much about different things? 2 1 2   4. Trouble relaxing? 2 1 2   5. Being so restless that it is hard to sit still? 1 1 1   6. Becoming easily annoyed or irritable? 1 1 1   7. Feeling afraid as if something awful might happen? 0 0 0   8. If you checked off any problems, how difficult have these problems made it for you to do your work, take care of things at home, or get along with other people? 1 1 0   MAHESH-7 Score 10 6 10         7/31/2024   PHQ-9 Depression Patient Health Questionnaire   Over the last two weeks how often have you been bothered by little interest or pleasure in doing things 1   Over the last two weeks how often have you been bothered by feeling down, depressed or hopeless 1   Over the last two weeks how often have you been bothered by trouble falling or staying asleep, or sleeping too much 1   Over the last two weeks how often have you been bothered by feeling tired or having little energy 1   Over the last two weeks how often have you been bothered by a poor appetite or overeating 1   Over the last two weeks how often have you been bothered by feeling bad about yourself - or that you are a failure or have let yourself or your family down 1   Over the last two weeks how often have you been bothered by trouble concentrating on things, such as reading the newspaper or watching television 1   Over the last two weeks how often have you been bothered by moving or speaking so slowly that other people could have noticed. 1   Over the last two weeks how often have you been bothered by thoughts that you would be better off dead, or  of hurting yourself 0   PHQ-9 Score 8     Outpatient Psychiatry Initial Visit (MD/NP)     1/26/2022     Denny Atkinson, a 37 y.o. male, presenting for initial evaluation visit. Met with patient.     Reason for Encounter: Referral from Ben Dos Santos. Patient complains of OCD/anxiety.     History of Present Illness:   This is a 37-year-old male, past medical history of hyperlipidemia, who presents today for initial evaluation.  He reports that he is here due to significant amount of anxiety in particular obsessive-compulsive related symptoms.  He states he is obsessively worrying.  He is a  and manages many graduates students.  He reports this is a very high stress/high pressure job.  His wife is a professor as well.  He has a 2-year-old girl, this is their first child. He has been on escitalopram for many years.  He states that is been working pretty well but he still does endorse a significant amount of anxiety.  Has trouble with sleep.  He does state that he drinks too much coffee and also uses Nicorette gum is a stimulant.  He states he used to play basketball 3-4 nights per week prior to COVID and this helped him sleep much more.  He has been exercising some but it is not enough to help him with his sleep.  He states this was a major stress release.  He has struggled for a while with tasks at his job.  He endorses ruminating thoughts.  Will have detailed notes that he has to reread and add to them.  It is impacting his functioning at this time.  When he was younger, he used to do repetitive handwashing but is not doing this now.  He does find that his symptoms are impacting his day-to-day life.  Medications that he has tried include bupropion, sertraline, fluoxetine, paroxetine.  He reports that he did have symptoms of serotonin toxicity during a cross taper of medications.  He states he is sensitive to medications.  Has not been on tricyclic antidepressants or Luvox.  Unsure about  trazodone.  He previously participated in therapy for many years, interested in therapy referral today.  Discussed sleep hygiene in detail.  Denies suicidal or homicidal ideation.  No other complaints today.     Endorses prior problems with substance use disorders - does not want to be prescribed anything habit forming.     Depression symptoms:  Endorses difficulty with sleep, feeling tired or having little energy, overeating, trouble concentrating.  Denies suicidal ideation.  PHQ-9 seven, somewhat difficult     Anxiety symptoms:  GAD7 1/27/2022   1. Feeling nervous, anxious, or on edge? 1   2. Not being able to stop or control worrying? 3   3. Worrying too much about different things? 3   4. Trouble relaxing? 2   5. Being so restless that it is hard to sit still? 0   6. Becoming easily annoyed or irritable? 1   7. Feeling afraid as if something awful might happen? 0   8. If you checked off any problems, how difficult have these problems made it for you to do your work, take care of things at home, or get along with other people? 1   MAHESH-7 Score 10            Fina/Hypomania symptoms: denies  MDQ Scale 1/27/2022   you felt so good or so hyper that other people thought you were not your normal self or you were so hyper that you got into trouble? 0   you were so irritable that you shouted at people or started fights or arguments? 0   you felt much more self-confident than usual? 0   you got much less sleep than usual and found that you didn't really miss it? 0   you were more talkative or spoke much faster than usual? 0   thoughts raced through your head or you couldn't slow your mind down? 1   you were so easily distracted by things around you that you had trouble concentrating or staying on track? 1   you had more energy than usual? 0   you were much more active or did many more things than usual? 0   you were much more social or outgoing than usual, for example, you telephoned friends in the middle of the night? 0    you were much more interested in sex than usual? 0   you did things that were unusual for you or that other people might have thought were excessive, foolish, or risky? 0   spending money got you or your family in trouble? 0   If you checked YES to more than one of the above, have several of these ever happened during the same period of time? 1   How much of a problem did any of these cause you - like being unable to work; having family, money or legal troubles; getting into arguments or fights? Moderate problem   Mood Disorder Questionnaire Score  2      Psychosis: denies     Attention/Concentration: fair     Body Image/Hx of eating disorders: denies, eating too much occasionally      Suicidal ideation and risk: denies suicidal thoughts, no access to guns, no hx of suicidal thoughts or attempts     Homicidal/Violient ideation and risk: denies     Sleep: poor sleep hygiene     Appetite: overeating     Past Psychiatric History:  Prior diagnoses: OCD, MAHESH, never been diagnosed with ADHD      Inpatient psychiatric treatment: denies     Outpatient psychiatric treatment: last saw psychiatrist, saw someone before Dr. Cast. Four or so years ago. Wasn't really thrilled. Was happy just on lexapro.      Prior medications: as described above     Current medications: lexapro 20mg daily     Prior suicide attempts: denies     Prior history self harm: denies     Prior psychotherapy: has participated in the past     Prior psychological testing: none     Allergies:       Review of patient's allergies indicates:   Allergen Reactions    Center-al house dust        Past Medical History:       Past Medical History:   Diagnosis Date    Allergy      Anxiety     Elevated cholesterol - not taking medicine      History TBI: denies  History seizures: denies     Past Surgical History:        Past Surgical History:   Procedure Laterality Date    FINGER SURGERY   2007    HERNIA REPAIR   1985      Family History:   Suicide: denies  Substance  use: great grandfather   Bipolar disorder/Psychotic disorder: denies  Anxiety: brother, mom and dad  Depression: denies     Social History:  Childhood: born in [place]. Grew up in [place]. Went to college in [employer]. Raised by biological mother and father. Good relationship. They live in [place], [place]. One brother in [place] - younger brother. He's doing well. He has some anxiety, definitely not OCD.   Marital status: has been  for almost 10 years, supportive wife   Children: 2 year old daughter, August   Resides: in [place], [place]  Occupation: professor - [employer]   Hobbies: basketball, enjoys walking/hiking with the family, riding bikes, likes to be outside  Yazdanism: not religlious   Education level: PhD  : denies  Legal: denies  History of abuse/trauma: denies     Substance History:  Tobacco: nicorette gum - used to smoke cigarettes, quit smoking cigarettes probably 12 years ago. Gum - 3 or 4 pieces of day - at night.   Alcohol: no alcohol, when he was in high school would drink a lot. Has addictive personality.   Drug use: high school - opioids, not stimulants, used benzos, had to go treatment.   Caffeine: high caffeine - one of those yeti of coffee - make at home, community coffee      Rehab:  Prior/current AA: inpatient detox/rehab 20 years ago - several times between [age], before college     Review of Systems   PSYCHIATRIC: Pertinant items are noted in the narrative.  RESPIRATORY: No shortness of breath.  CARDIOVASCULAR: No tachycardia or chest pain.  GASTROINTESTINAL: No nausea, vomiting, pain, constipation or diarrhea.    Past Medical, Family and Social History: The patient's past medical, family and social history have been reviewed and updated as appropriate within the electronic medical record - see encounter notes.      Risk Parameters:  Patient reports no suicidal ideation  Patient reports no homicidal ideation  Patient reports no self-injurious behavior  Patient reports no violent behavior    Exam  (detailed: at least 9 elements; comprehensive: all 15 elements)   Constitutional  Vitals:  Most recent vital signs, dated less than 90 days prior to this appointment, were reviewed.     Vitals:    07/31/24 0800   BP: 118/77   Pulse: 73        General:  unremarkable, age appropriate     Musculoskeletal  Muscle Strength/Tone:  no dyskinesia   Gait & Station:  non-ataxic     Psychiatric  Speech:  no latency; no press   Mood & Affect:  anxious  mood-congruent   Thought Process:  normal and logical   Associations:  intact   Thought Content:  normal, no suicidality, no homicidality, delusions, or paranoia   Insight:  intact   Judgement: behavior is adequate to circumstances   Orientation:  grossly intact   Memory: intact for content of interview   Language: grossly intact   Attention Span & Concentration:  able to focus   Fund of Knowledge:  intact and appropriate to age and level of education     Assessment and Diagnosis   Status/Progress: Based on the examination today, the patient's problem(s) is/are adequately but not ideally controlled.  New problems have not been presented today.   Co-morbidities, Diagnostic uncertainty, and Lack of compliance are not complicating management of the primary condition.      General Impression:   OCD with good insight  MAHESH     Intervention/Counseling/Treatment Plan   Medication Management: The risks and benefits of medication were discussed with the patient.  Counseling provided with patient as follows: importance of compliance with chosen treatment options was emphasized, risks and benefits of treatment options, including medications, were discussed with the patient, risk factor reduction, prognosis    Denny is seen today for medication follow-up. Based on assessment:    Continue aripiprazole 2 mg once daily for augmentation of OCD symptoms.  Medication risks, side effects, benefits explained in detail. Discussed risks of tardive dyskinesia, drug induced parkinsonism, metabolic side  effects, including weight gain, neuroleptic malignant syndrome   Increase sertraline to 100 mg daily for OCD symptoms.  Trial headspace for guided meditation.   Continue with Barbra Acuna for imtt  Provided information on [facility] in [place].  Consider naltrexone    Please go to emergency department if feeling as though you are a harm to yourself or others or if you are in crisis. Please call the clinic to report any worsening of symptoms or problems associated with medication.    Discussed with patient informed consent, risks vs. benefits, alternative treatments, side effect profile and the inherent unpredictability of individual responses to these treatments. The patient expresses understanding of the above and displays the capacity to agree with this current plan and had no other questions.    Discussed risk of serotonin syndrome with these medications. Symptoms of concern include agitation/restlessness, confusion, rapid heart rate/high blood pressure, dilated pupils, loss of muscle coordination, muscle rigidity, heavy sweating.      Return to Clinic: as scheduled, as needed

## 2024-08-09 DIAGNOSIS — I77.74 VERTEBRAL ARTERY DISSECTION: Primary | ICD-10-CM

## 2024-08-15 ENCOUNTER — HOSPITAL ENCOUNTER (OUTPATIENT)
Dept: RADIOLOGY | Facility: HOSPITAL | Age: 39
Discharge: HOME | End: 2024-08-15
Attending: STUDENT IN AN ORGANIZED HEALTH CARE EDUCATION/TRAINING PROGRAM
Payer: COMMERCIAL

## 2024-08-15 DIAGNOSIS — I77.74 VERTEBRAL ARTERY DISSECTION: ICD-10-CM

## 2024-08-15 PROCEDURE — 70498 CT ANGIOGRAPHY NECK: CPT | Mod: TC,PO

## 2024-08-15 PROCEDURE — 25500020 PHARM REV CODE 255: Mod: PO | Performed by: STUDENT IN AN ORGANIZED HEALTH CARE EDUCATION/TRAINING PROGRAM

## 2024-08-15 PROCEDURE — 70551 MRI BRAIN STEM W/O DYE: CPT | Mod: 26,,, | Performed by: RADIOLOGY

## 2024-08-15 PROCEDURE — 70498 CT ANGIOGRAPHY NECK: CPT | Mod: 26,,, | Performed by: RADIOLOGY

## 2024-08-15 PROCEDURE — 70551 MRI BRAIN STEM W/O DYE: CPT | Mod: TC,PO

## 2024-08-15 PROCEDURE — 70496 CT ANGIOGRAPHY HEAD: CPT | Mod: 26,,, | Performed by: RADIOLOGY

## 2024-08-15 RX ADMIN — IOHEXOL 100 ML: 350 INJECTION, SOLUTION INTRAVENOUS at 11:08

## 2024-09-06 ENCOUNTER — OFFICE VISIT (OUTPATIENT)
Dept: PSYCHIATRY | Facility: CLINIC | Age: 39
End: 2024-09-06
Payer: COMMERCIAL

## 2024-09-06 VITALS
DIASTOLIC BLOOD PRESSURE: 74 MMHG | WEIGHT: 233.25 LBS | HEIGHT: 74 IN | BODY MASS INDEX: 29.93 KG/M2 | SYSTOLIC BLOOD PRESSURE: 117 MMHG | HEART RATE: 71 BPM

## 2024-09-06 DIAGNOSIS — F41.1 GAD (GENERALIZED ANXIETY DISORDER): ICD-10-CM

## 2024-09-06 DIAGNOSIS — F42.9 OBSESSIVE-COMPULSIVE DISORDER, UNSPECIFIED TYPE: Primary | ICD-10-CM

## 2024-09-06 PROCEDURE — 3044F HG A1C LEVEL LT 7.0%: CPT | Mod: CPTII,S$GLB,, | Performed by: PHYSICIAN ASSISTANT

## 2024-09-06 PROCEDURE — 3008F BODY MASS INDEX DOCD: CPT | Mod: CPTII,S$GLB,, | Performed by: PHYSICIAN ASSISTANT

## 2024-09-06 PROCEDURE — 1159F MED LIST DOCD IN RCRD: CPT | Mod: CPTII,S$GLB,, | Performed by: PHYSICIAN ASSISTANT

## 2024-09-06 PROCEDURE — 99999 PR PBB SHADOW E&M-EST. PATIENT-LVL III: CPT | Mod: PBBFAC,,, | Performed by: PHYSICIAN ASSISTANT

## 2024-09-06 PROCEDURE — 3074F SYST BP LT 130 MM HG: CPT | Mod: CPTII,S$GLB,, | Performed by: PHYSICIAN ASSISTANT

## 2024-09-06 PROCEDURE — 1160F RVW MEDS BY RX/DR IN RCRD: CPT | Mod: CPTII,S$GLB,, | Performed by: PHYSICIAN ASSISTANT

## 2024-09-06 PROCEDURE — 3078F DIAST BP <80 MM HG: CPT | Mod: CPTII,S$GLB,, | Performed by: PHYSICIAN ASSISTANT

## 2024-09-06 PROCEDURE — G2211 COMPLEX E/M VISIT ADD ON: HCPCS | Mod: S$GLB,,, | Performed by: PHYSICIAN ASSISTANT

## 2024-09-06 PROCEDURE — 99214 OFFICE O/P EST MOD 30 MIN: CPT | Mod: S$GLB,,, | Performed by: PHYSICIAN ASSISTANT

## 2024-09-06 RX ORDER — SERTRALINE HYDROCHLORIDE 100 MG/1
100 TABLET, FILM COATED ORAL DAILY
Qty: 90 TABLET | Refills: 1 | Status: SHIPPED | OUTPATIENT
Start: 2024-09-06

## 2024-09-06 RX ORDER — ARIPIPRAZOLE 2 MG/1
2 TABLET ORAL DAILY
Qty: 90 TABLET | Refills: 1 | Status: SHIPPED | OUTPATIENT
Start: 2024-09-06

## 2024-09-06 NOTE — PROGRESS NOTES
Outpatient Psychiatry Follow-Up Visit (MD/NP)    9/6/2024    Clinical Status of Patient:  Outpatient (Ambulatory)    Chief Complaint:  Denny Atkinson is a 39 y.o. male who presents today for follow-up of anxiety.  Met with patient.      Interval History and Content of Current Session:  Interim Events/Subjective Report/Content of Current Session:  Denny is seen today for medication follow-up.  Unfortunately, he was not able to follow-up for his Neurology visit (provider left) so at this time, he has continued on medications related to vascular concerns earlier this year.  He does have scheduled follow up with Neurology in the [facility] system.  He will be gone for a month for research and would like to continue with sertraline and Abilify.  Consider Luvox upon arrival back.  Has been participating in ImTT with Barbra Acuna.  Unsure if it is profoundly helpful but going through the process.  Denies suicidal or homicidal ideation.  No other complaints today.    FROM PREVIOUS HPI  Denny is seen today for medication follow-up.  He reports he has been able to notice a change for the worse with utilizing sertraline instead of Luvox for OCD type symptoms.  He will have neurology follow-up in early September and ideally would be able to discontinue blood thinners at that time.  If he is cleared, we could look at resuming Luvox for OCD type symptoms especially since he has noticed the benefit of the medication.  Discuss upcoming work stressors.  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  Denies suicidal or homicidal ideation.  No other complaints today.    IMTT - with Barbra Acuna.   [phone] - call her to coordinate     Plan from neuro on chart review:   check on med interaction for fluxoxamine and plavix  repeat imaging  continue DAPT x 6 months  reassess after imaging   referral to  to look for connective tissue disorder   RTC september with dr. boateng.     He  does note that he has had much more generalized anxiety and comparison to just obsessive-compulsive symptoms.          9/6/2024     8:25 AM 7/31/2024     8:00 AM 6/3/2024    12:55 PM   GAD7   1. Feeling nervous, anxious, or on edge? 1 2 1   2. Not being able to stop or control worrying? 1 2 1   3. Worrying too much about different things? 1 2 1   4. Trouble relaxing? 1 2 1   5. Being so restless that it is hard to sit still? 0 1 1   6. Becoming easily annoyed or irritable? 0 1 1   7. Feeling afraid as if something awful might happen? 0 0 0   8. If you checked off any problems, how difficult have these problems made it for you to do your work, take care of things at home, or get along with other people? 1 1 1   MAHESH-7 Score 4 10 6         9/6/2024   PHQ-9 Depression Patient Health Questionnaire   Over the last two weeks how often have you been bothered by little interest or pleasure in doing things 1   Over the last two weeks how often have you been bothered by feeling down, depressed or hopeless 0   Over the last two weeks how often have you been bothered by trouble falling or staying asleep, or sleeping too much 0   Over the last two weeks how often have you been bothered by feeling tired or having little energy 0   Over the last two weeks how often have you been bothered by a poor appetite or overeating 0   Over the last two weeks how often have you been bothered by feeling bad about yourself - or that you are a failure or have let yourself or your family down 0   Over the last two weeks how often have you been bothered by trouble concentrating on things, such as reading the newspaper or watching television 1   Over the last two weeks how often have you been bothered by moving or speaking so slowly that other people could have noticed. 0   Over the last two weeks how often have you been bothered by thoughts that you would be better off dead, or of hurting yourself 0   PHQ-9 Score 2     Outpatient Psychiatry Initial  Visit (MD/NP)     1/26/2022     Denny Atkinson, a 37 y.o. male, presenting for initial evaluation visit. Met with patient.     Reason for Encounter: Referral from Ben Dos Santos. Patient complains of OCD/anxiety.     History of Present Illness:   This is a 37-year-old male, past medical history of hyperlipidemia, who presents today for initial evaluation.  He reports that he is here due to significant amount of anxiety in particular obsessive-compulsive related symptoms.  He states he is obsessively worrying.  He is a  and manages many graduates students.  He reports this is a very high stress/high pressure job.  His wife is a professor as well.  He has a 2-year-old girl, this is their first child. He has been on escitalopram for many years.  He states that is been working pretty well but he still does endorse a significant amount of anxiety.  Has trouble with sleep.  He does state that he drinks too much coffee and also uses Nicorette gum is a stimulant.  He states he used to play basketball 3-4 nights per week prior to COVID and this helped him sleep much more.  He has been exercising some but it is not enough to help him with his sleep.  He states this was a major stress release.  He has struggled for a while with tasks at his job.  He endorses ruminating thoughts.  Will have detailed notes that he has to reread and add to them.  It is impacting his functioning at this time.  When he was younger, he used to do repetitive handwashing but is not doing this now.  He does find that his symptoms are impacting his day-to-day life.  Medications that he has tried include bupropion, sertraline, fluoxetine, paroxetine.  He reports that he did have symptoms of serotonin toxicity during a cross taper of medications.  He states he is sensitive to medications.  Has not been on tricyclic antidepressants or Luvox.  Unsure about trazodone.  He previously participated in therapy for many years, interested in  therapy referral today.  Discussed sleep hygiene in detail.  Denies suicidal or homicidal ideation.  No other complaints today.     Endorses prior problems with substance use disorders - does not want to be prescribed anything habit forming.     Depression symptoms:  Endorses difficulty with sleep, feeling tired or having little energy, overeating, trouble concentrating.  Denies suicidal ideation.  PHQ-9 seven, somewhat difficult     Anxiety symptoms:  GAD7 1/27/2022   1. Feeling nervous, anxious, or on edge? 1   2. Not being able to stop or control worrying? 3   3. Worrying too much about different things? 3   4. Trouble relaxing? 2   5. Being so restless that it is hard to sit still? 0   6. Becoming easily annoyed or irritable? 1   7. Feeling afraid as if something awful might happen? 0   8. If you checked off any problems, how difficult have these problems made it for you to do your work, take care of things at home, or get along with other people? 1   MAHESH-7 Score 10            Fina/Hypomania symptoms: denies  MDQ Scale 1/27/2022   you felt so good or so hyper that other people thought you were not your normal self or you were so hyper that you got into trouble? 0   you were so irritable that you shouted at people or started fights or arguments? 0   you felt much more self-confident than usual? 0   you got much less sleep than usual and found that you didn't really miss it? 0   you were more talkative or spoke much faster than usual? 0   thoughts raced through your head or you couldn't slow your mind down? 1   you were so easily distracted by things around you that you had trouble concentrating or staying on track? 1   you had more energy than usual? 0   you were much more active or did many more things than usual? 0   you were much more social or outgoing than usual, for example, you telephoned friends in the middle of the night? 0   you were much more interested in sex than usual? 0   you did things that were  unusual for you or that other people might have thought were excessive, foolish, or risky? 0   spending money got you or your family in trouble? 0   If you checked YES to more than one of the above, have several of these ever happened during the same period of time? 1   How much of a problem did any of these cause you - like being unable to work; having family, money or legal troubles; getting into arguments or fights? Moderate problem   Mood Disorder Questionnaire Score  2      Psychosis: denies     Attention/Concentration: fair     Body Image/Hx of eating disorders: denies, eating too much occasionally      Suicidal ideation and risk: denies suicidal thoughts, no access to guns, no hx of suicidal thoughts or attempts     Homicidal/Violient ideation and risk: denies     Sleep: poor sleep hygiene     Appetite: overeating     Past Psychiatric History:  Prior diagnoses: OCD, MAHESH, never been diagnosed with ADHD      Inpatient psychiatric treatment: denies     Outpatient psychiatric treatment: last saw psychiatrist, saw someone before Dr. Cast. Four or so years ago. Wasn't really thrilled. Was happy just on lexapro.      Prior medications: as described above     Current medications: lexapro 20mg daily     Prior suicide attempts: denies     Prior history self harm: denies     Prior psychotherapy: has participated in the past     Prior psychological testing: none     Allergies:       Review of patient's allergies indicates:   Allergen Reactions    Center-al house dust        Past Medical History:       Past Medical History:   Diagnosis Date    Allergy      Anxiety     Elevated cholesterol - not taking medicine      History TBI: denies  History seizures: denies     Past Surgical History:        Past Surgical History:   Procedure Laterality Date    FINGER SURGERY   2007    HERNIA REPAIR   1985      Family History:   Suicide: denies  Substance use: great grandfather   Bipolar disorder/Psychotic disorder: denies  Anxiety:  brother, mom and dad  Depression: denies     Social History:  Childhood: born in [place]. Grew up in [place]. Went to college in [employer]. Raised by biological mother and father. Good relationship. They live in [place], [place]. One brother in [place] - younger brother. He's doing well. He has some anxiety, definitely not OCD.   Marital status: has been  for almost 10 years, supportive wife   Children: 2 year old daughter, August   Resides: in [place], [place]  Occupation: professor - [employer]   Hobbies: basketball, enjoys walking/hiking with the family, riding bikes, likes to be outside  Pentecostalism: not religlious   Education level: PhD  : denies  Legal: denies  History of abuse/trauma: denies     Substance History:  Tobacco: nicorette gum - used to smoke cigarettes, quit smoking cigarettes probably 12 years ago. Gum - 3 or 4 pieces of day - at night.   Alcohol: no alcohol, when he was in high school would drink a lot. Has addictive personality.   Drug use: high school - opioids, not stimulants, used benzos, had to go treatment.   Caffeine: high caffeine - one of those yeti of coffee - make at home, community coffee      Rehab:  Prior/current AA: inpatient detox/rehab 20 years ago - several times between [age], before college     Review of Systems   PSYCHIATRIC: Pertinant items are noted in the narrative.  RESPIRATORY: No shortness of breath.  CARDIOVASCULAR: No tachycardia or chest pain.  GASTROINTESTINAL: No nausea, vomiting, pain, constipation or diarrhea.    Past Medical, Family and Social History: The patient's past medical, family and social history have been reviewed and updated as appropriate within the electronic medical record - see encounter notes.      Risk Parameters:  Patient reports no suicidal ideation  Patient reports no homicidal ideation  Patient reports no self-injurious behavior  Patient reports no violent behavior    Exam (detailed: at least 9 elements; comprehensive: all 15 elements)  "  Constitutional  Vitals:  Most recent vital signs, dated less than 90 days prior to this appointment, were reviewed.     Vitals:    09/06/24 0831   BP: 117/74   Pulse: 71            General:  unremarkable, age appropriate     Musculoskeletal  Muscle Strength/Tone:  no dyskinesia   Gait & Station:  non-ataxic     Psychiatric  Speech:  no latency; no press   Mood & Affect:  "Okay"  mood-congruent   Thought Process:  normal and logical   Associations:  intact   Thought Content:  normal, no suicidality, no homicidality, delusions, or paranoia   Insight:  intact   Judgement: behavior is adequate to circumstances   Orientation:  grossly intact   Memory: intact for content of interview   Language: grossly intact   Attention Span & Concentration:  able to focus   Fund of Knowledge:  intact and appropriate to age and level of education     Assessment and Diagnosis   Status/Progress: Based on the examination today, the patient's problem(s) is/are adequately but not ideally controlled.  New problems have not been presented today.   Co-morbidities, Diagnostic uncertainty, and Lack of compliance are not complicating management of the primary condition.      General Impression:   OCD with good insight  MAHESH     Intervention/Counseling/Treatment Plan   Medication Management: The risks and benefits of medication were discussed with the patient.  Counseling provided with patient as follows: importance of compliance with chosen treatment options was emphasized, risks and benefits of treatment options, including medications, were discussed with the patient, risk factor reduction, prognosis    Denny is seen today for medication follow-up. Based on assessment:    Continue aripiprazole 2 mg once daily for augmentation of OCD symptoms.  Medication risks, side effects, benefits explained in detail. Discussed risks of tardive dyskinesia, drug induced parkinsonism, metabolic side effects, including weight gain, neuroleptic malignant syndrome "   Continue sertraline 100 mg daily for OCD symptoms.  Trial headspace for guided meditation.   Continue with Barbra Acuna for imtt  Provided information on [facility] in [place].  Consider naltrexone    Please go to emergency department if feeling as though you are a harm to yourself or others or if you are in crisis. Please call the clinic to report any worsening of symptoms or problems associated with medication.    Discussed with patient informed consent, risks vs. benefits, alternative treatments, side effect profile and the inherent unpredictability of individual responses to these treatments. The patient expresses understanding of the above and displays the capacity to agree with this current plan and had no other questions.    Discussed risk of serotonin syndrome with these medications. Symptoms of concern include agitation/restlessness, confusion, rapid heart rate/high blood pressure, dilated pupils, loss of muscle coordination, muscle rigidity, heavy sweating.      Return to Clinic: as scheduled, as needed

## 2024-09-13 ENCOUNTER — OFFICE VISIT (OUTPATIENT)
Dept: NEUROLOGY | Facility: CLINIC | Age: 39
End: 2024-09-13
Payer: COMMERCIAL

## 2024-09-13 VITALS
WEIGHT: 233.25 LBS | HEART RATE: 61 BPM | HEIGHT: 74 IN | BODY MASS INDEX: 29.93 KG/M2 | DIASTOLIC BLOOD PRESSURE: 81 MMHG | SYSTOLIC BLOOD PRESSURE: 127 MMHG

## 2024-09-13 DIAGNOSIS — F41.1 GAD (GENERALIZED ANXIETY DISORDER): ICD-10-CM

## 2024-09-13 DIAGNOSIS — I77.74 VERTEBRAL ARTERY DISSECTION: Primary | ICD-10-CM

## 2024-09-13 PROCEDURE — 99999 PR PBB SHADOW E&M-EST. PATIENT-LVL III: CPT | Mod: PBBFAC,,, | Performed by: STUDENT IN AN ORGANIZED HEALTH CARE EDUCATION/TRAINING PROGRAM

## 2024-09-13 PROCEDURE — 99204 OFFICE O/P NEW MOD 45 MIN: CPT | Mod: S$GLB,,, | Performed by: STUDENT IN AN ORGANIZED HEALTH CARE EDUCATION/TRAINING PROGRAM

## 2024-09-13 PROCEDURE — 3008F BODY MASS INDEX DOCD: CPT | Mod: CPTII,S$GLB,, | Performed by: STUDENT IN AN ORGANIZED HEALTH CARE EDUCATION/TRAINING PROGRAM

## 2024-09-13 PROCEDURE — 3044F HG A1C LEVEL LT 7.0%: CPT | Mod: CPTII,S$GLB,, | Performed by: STUDENT IN AN ORGANIZED HEALTH CARE EDUCATION/TRAINING PROGRAM

## 2024-09-13 PROCEDURE — 1159F MED LIST DOCD IN RCRD: CPT | Mod: CPTII,S$GLB,, | Performed by: STUDENT IN AN ORGANIZED HEALTH CARE EDUCATION/TRAINING PROGRAM

## 2024-09-13 PROCEDURE — 1160F RVW MEDS BY RX/DR IN RCRD: CPT | Mod: CPTII,S$GLB,, | Performed by: STUDENT IN AN ORGANIZED HEALTH CARE EDUCATION/TRAINING PROGRAM

## 2024-09-13 PROCEDURE — 3079F DIAST BP 80-89 MM HG: CPT | Mod: CPTII,S$GLB,, | Performed by: STUDENT IN AN ORGANIZED HEALTH CARE EDUCATION/TRAINING PROGRAM

## 2024-09-13 PROCEDURE — 3074F SYST BP LT 130 MM HG: CPT | Mod: CPTII,S$GLB,, | Performed by: STUDENT IN AN ORGANIZED HEALTH CARE EDUCATION/TRAINING PROGRAM

## 2024-09-13 RX ORDER — ROSUVASTATIN CALCIUM 10 MG/1
10 TABLET, COATED ORAL DAILY
Qty: 90 TABLET | Refills: 3 | Status: SHIPPED | OUTPATIENT
Start: 2024-09-13 | End: 2025-09-13

## 2024-09-13 NOTE — PROGRESS NOTES
Vascular Neurology Clinic  Initial Consult    Patient Name: Denny Atkinson  MRN: [id number]    CC: Traumatic vertebral artery dissection.    HPI: Denny Atkinson is a 39 y.o. R-handed male w/ PMHx of OCD and MAHESH  presenting as a self referral.   Previously followed w/ OSH neurologist and now presents to re-establish care prior to a lengthy work-trip/ research trip.   Patient was stretching his neck with a device that he purchase on [employer]   - Felt a pop and saw stars temporary    - Subsequently developed nausea/vomitting  which prompted him to go to the ED  Was evaluated and noted to have R vert occlusion w./ no evidence of AIS on MRI.   Treated w/ DAPT and started on a statin.   Denies any further episodes of weakness/numbness/ dizziness/ speech difficulty, abnormal coordination.   Was previously followed by an OSH neurologist, but was unable to schedule a follow up appointment due to them moving to a different practice.       Brain Imaging  MRI Brain 8/15/2024  No acute abnormality  MRI Brain 3/28/2024  No significant intracranial abnormalities.   Vessel Imaging  CTA H/N 8/15/2024  Interval improvement in previously demonstrated narrowing of the distal cervical segment of the left vertebral artery.  Subtle areas of narrowing described within the right vertebral artery similarly appear less prominent than on the previous examination.  There is mild focal narrowing of the distal intracranial segment of the right vertebral artery.  Tortuosity of the distal cervical segment of the right internal carotid artery.     CTA H/N 3/28/2024  Short segment of the left vertebral artery stenosis at the C1 level involving the V3 segment. Similar subtle narrowing of the right vertebral artery at the C1 level also noted. Additionally there is short segment of right vertebral artery narrowing at the C5 level, V2 segment. The narrowing of the vertebral arteries at the C1 level, V3 segment could reflect thrombosed  dissections.    Relevant Lab work   Recent Labs   Lab 24  0028 24  0927   Hemoglobin A1C 5.7 H 5.6   LDL Calculated 86  --    HDL 39 L  --    Triglycerides 113  --    Cholesterol 148  --          NIH Stroke Scale:  Interval: baseline (upon arrival/admit)  Level of Consciousness: 0 - alert  LOC Questions: 0 - answers both correctly  LOC Commands: 0 - performs both correctly  Best Gaze: 0 - normal  Visual: 0 - no visual loss  Facial Palsy: 0 - normal  Motor Left Arm: 0 - no drift  Motor Right Arm: 0 - no drift  Motor Left Le - no drift  Motor Right Le - no drift  Limb Ataxia: 0 - absent  Sensory: 0 - normal  Best Language: 0 - no aphasia  Dysarthria: 0 - normal articulation  Extinction and Inattention: 0 - no neglect  NIH Stroke Scale Total: 0         Review of Systems:  General: No fevers, chills  Eyes: No changes in vision  ENT: No changes in hearing  Respiratory: No SOB  CV: No chest pain, palpitations  GI: No diarrhea, blood in stool  Urinary: No dysuria, hematuria  Skin: No rashes  Neurological: No weakness, confusion  Psychiatric: No auditory nor visual hallucinations      Past Medical History  Past Medical History:   Diagnosis Date    Allergy     Anxiety        Medications    Current Outpatient Medications:     ARIPiprazole (ABILIFY) 2 MG Tab, Take 1 tablet (2 mg total) by mouth once daily., Disp: 90 tablet, Rfl: 1    aspirin 81 MG Chew, Take 81 mg by mouth once daily., Disp: , Rfl:     cetirizine (ZYRTEC) 10 MG tablet, Take 10 mg by mouth daily as needed for Allergies or Rhinitis., Disp: , Rfl:     fluticasone (FLONASE) 50 mcg/actuation nasal spray, 1 spray by Each Nare route once daily., Disp: , Rfl:     meclizine (ANTIVERT) 12.5 mg tablet, Take 1 tablet (12.5 mg total) by mouth 3 (three) times daily as needed for Dizziness or Nausea., Disp: 30 tablet, Rfl: 0    multivitamin (THERAGRAN) per tablet, Take 1 tablet by mouth once daily., Disp: , Rfl:     omega-3 fatty acids/fish oil (FISH  OIL-OMEGA-3 FATTY ACIDS) 300-1,000 mg capsule, Take 1 capsule by mouth 2 (two) times a day., Disp: , Rfl:     sertraline (ZOLOFT) 100 MG tablet, Take 1 tablet (100 mg total) by mouth once daily., Disp: 90 tablet, Rfl: 1    EPINEPHrine (EPIPEN) 0.3 mg/0.3 mL AtIn, Inject 0.3 mLs (0.3 mg total) into the muscle once. for 1 dose, Disp: 0.3 mL, Rfl: 1    rosuvastatin (CRESTOR) 10 MG tablet, Take 1 tablet (10 mg total) by mouth once daily., Disp: 90 tablet, Rfl: 3  Any other notable medications as documented in HPI    Allergies  Review of patient's allergies indicates:   Allergen Reactions    Center-al house dust        Social History  Social History     Socioeconomic History    Marital status:     Number of children: 1   Tobacco Use    Smoking status: Former     Current packs/day: 0.00     Types: Cigarettes     Quit date:      Years since quittin.     Passive exposure: Past    Smokeless tobacco: Never   Substance and Sexual Activity    Alcohol use: No    Drug use: No    Sexual activity: Yes     Social Drivers of Health     Financial Resource Strain: Low Risk  (2023)    Overall Financial Resource Strain (CARDIA)     Difficulty of Paying Living Expenses: Not hard at all   Food Insecurity: Patient Declined (3/29/2024)    Received from [facility], [facility]    Hunger Vital Sign     Worried About Running Out of Food in the Last Year: Patient declined     Ran Out of Food in the Last Year: Patient declined   Transportation Needs: Patient Declined (3/29/2024)    Received from [facility], [facility]    PRAPARE - Transportation     Lack of Transportation (Medical): Patient declined     Lack of Transportation (Non-Medical): Patient declined   Physical Activity: Unknown (2023)    Exercise Vital Sign     Days of Exercise per Week: 2 days   Stress: Stress Concern Present (2023)    [employer] - Occupational Stress Questionnaire     Feeling of Stress : To some extent  "  Housing Stability: Patient Declined (3/29/2024)    [facility] from [facility], [facility]    Housing Stability Vital Sign     Unable to Pay for Housing in the Last Year: Patient declined     Number of Places Lived in the Last Year: 1     Unstable Housing in the Last Year: Patient declined     Any other notable Social History as documented in HPI.    Family History  Family History   Problem Relation Name Age of Onset    Hypertension Father      Hyperlipidemia Father      Hypertension Brother      Hyperlipidemia Brother      Hypertension Paternal Grandmother      Cancer Paternal Grandfather      Hypertension Paternal Grandfather      Hyperlipidemia Paternal Grandfather       Any other notable FMH as documented in HPI.    Physical Exam  /81 (BP Location: Right arm, Patient Position: Sitting, BP Method: Medium (Automatic))   Pulse 61   Ht 6' 2" (1.88 m)   Wt 105.8 kg (233 lb 4 oz)   BMI 29.95 kg/m²     General: Well-developed, well-groomed. No apparent distress  HENT: Normocephalic, atraumatic.    Cardiovascular: Regular rate and rhythm  Chest: No audible wheezes, stridor, ronchi appreciated.  Musculoskeletal: No peripheral edema    Neurologic Exam: The patient is awake, alert and oriented to person, place, time and situation. Attentive, vigilant during exam. Language is fluent. Naming & repetition intact, +2-step commands.  Fund of knowledge is appropriate. Well organized thoughts.      Cranial nerves:   CN II: Visual fields are full to confrontation. Pupils are 4 mm and briskly reactive to light.  CN III, IV, VI: EOMI, no nystagmus, no ptosis  CN V: Facial sensation is intact in all 3 divisions bilaterally.  CN VII: Face is symmetric with normal eye closure and smile.  CN VIII: Hearing is normal bilaterally  CN IX, X: Palate elevates symmetrically. Phonation is normal.  CN XI: Head turning and shoulder shrug are intact  CN XII: Tongue is midline with normal movements and no atrophy.    Motor examination of " all extremities demonstrates normal bulk and tone in all four limbs. There are no atrophy or fasciculations.      Left Right   Left Right   Deltoid 5/5 5/5  Hip Flexion 5/5 5/5   Biceps 5/5 5/5  Hip Extension 5/5 5/5   Triceps 5/5 5/5  Knee Flexion 5/5 5/5   Wrist Ext 5/5 5/5  Knee Extension 5/5 5/5   Finger Abd 5/5 5/5  Ankle dorsiflex 5/5 5/5       Ankle plantar flex 5/5 5/5       Sensory examination is normal light touch in BUE and BLE.      Deep tendon reflexes are 2+ and symmetric in the upper and lower extremities bilaterally    Gait: Normal tandem, and casual gait.    Coordination: No dysmetria with finger-to-nose. Rapid alternating movements and fine finger movements are intact.   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   Discussed the nature of the dissection and the lack of indication for statin therapy.   Reviewed the CTA H/N (original and the latest one)  with the patient extensively.   Discussed that there is no indication for statin therapy from vascular neurology perspective  Ok to stop Plavix at this time  May continue ASA 81 mg during your work-trip out of abundance of caution, but overall can stop the ASA 81 mg due to traumatic nature of vascular injury, no evidence of ischemic stroke and no evidence of connective tissue disorder.     Avoid all activities which increase the risk of sudden, rapid, or severe neck motion or activities that maintaine the neck in an extended position for a prolonged period of time. Note: All activities that you may engage in cannot be covered. However these are a few guiding principles for activities to avoid:    Chiropractic Neck Manipulation.  Yoga and Pilates (Note: without neck manipulation these activities are acceptable).  Heavy weight lifting  (particularly neck and upper extremity). Light weights are acceptable. Tip: Avoid weights that make you grunt (this is also known as valsalva). Breath through all weight lifting maneuvers.  Prolonged neck flexion or hyperextension for extended periods of time (i.e. ceiling painting, having hair washed at  or prolonged dentist treatments).  Deep tissue massage of the neck (lighter massage is fine).  Roller Coaster Rides or similar amusements type rides.  Contact Sports such as martial arts and American football.  Zip riding.  High intensity aerobics (moderate aerobic activities such as swimming, cycling and running are fine).        Recommendations:   Antiplatelet/Anticoagulation:*1 mg ASA fdor the trip, can stop thereafter   Lipid Management: Not indicated from stroke perspective   Sleep: Denies any symptoms of WILLOW. Consider Sleep Medicine follow up in the future if suspicion for WILLOW occurs.   Smoking: Goal is smoking cessation and encouragement not to start smoking in the future.  - Not a smoker    Diet: Discussed Mediterranean Diet recommendations (Adopted from Analilia et al, [facility], 2018.)  - Eat primarily plant-based foods, such as fruits and vegetables, whole grains, legumes (beans) and nuts  - Limit refined carbohydrates (white pasta, bread, rice).  - Replace butter with healthy fats such as olive oil.  - Use herbs and spices instead of salt to flavor foods.  - Limit red meat and processed meats to no more than a few times a month.  - Avoid sugary sodas, bakery goods, and sweets.  - Eat fish and poultry at least twice a week.  - Get plenty of exercise (150 minutes per week).    RTC in PRN    Problem List Items Addressed This Visit          Neuro    Vertebral artery dissection - Primary       Psychiatric    MAHESH (generalized anxiety disorder)           Maria E Allen MD  Vascular Neurology  [facility]  [address] [place] [address]

## 2024-10-10 PROBLEM — I77.74 VERTEBRAL ARTERY DISSECTION: Status: ACTIVE | Noted: 2024-06-17

## 2024-11-17 ENCOUNTER — PATIENT MESSAGE (OUTPATIENT)
Dept: PSYCHIATRY | Facility: CLINIC | Age: 39
End: 2024-11-17
Payer: COMMERCIAL

## 2024-11-18 ENCOUNTER — OFFICE VISIT (OUTPATIENT)
Dept: PSYCHIATRY | Facility: CLINIC | Age: 39
End: 2024-11-18
Payer: COMMERCIAL

## 2024-11-18 VITALS
BODY MASS INDEX: 30.23 KG/M2 | DIASTOLIC BLOOD PRESSURE: 82 MMHG | HEART RATE: 60 BPM | SYSTOLIC BLOOD PRESSURE: 124 MMHG | WEIGHT: 235.56 LBS | HEIGHT: 74 IN

## 2024-11-18 DIAGNOSIS — F41.1 GAD (GENERALIZED ANXIETY DISORDER): ICD-10-CM

## 2024-11-18 DIAGNOSIS — F42.9 OBSESSIVE-COMPULSIVE DISORDER, UNSPECIFIED TYPE: Primary | ICD-10-CM

## 2024-11-18 PROCEDURE — 3074F SYST BP LT 130 MM HG: CPT | Mod: CPTII,S$GLB,, | Performed by: PHYSICIAN ASSISTANT

## 2024-11-18 PROCEDURE — G2211 COMPLEX E/M VISIT ADD ON: HCPCS | Mod: S$GLB,,, | Performed by: PHYSICIAN ASSISTANT

## 2024-11-18 PROCEDURE — 3079F DIAST BP 80-89 MM HG: CPT | Mod: CPTII,S$GLB,, | Performed by: PHYSICIAN ASSISTANT

## 2024-11-18 PROCEDURE — 3044F HG A1C LEVEL LT 7.0%: CPT | Mod: CPTII,S$GLB,, | Performed by: PHYSICIAN ASSISTANT

## 2024-11-18 PROCEDURE — 1160F RVW MEDS BY RX/DR IN RCRD: CPT | Mod: CPTII,S$GLB,, | Performed by: PHYSICIAN ASSISTANT

## 2024-11-18 PROCEDURE — 3008F BODY MASS INDEX DOCD: CPT | Mod: CPTII,S$GLB,, | Performed by: PHYSICIAN ASSISTANT

## 2024-11-18 PROCEDURE — 99214 OFFICE O/P EST MOD 30 MIN: CPT | Mod: S$GLB,,, | Performed by: PHYSICIAN ASSISTANT

## 2024-11-18 PROCEDURE — 1159F MED LIST DOCD IN RCRD: CPT | Mod: CPTII,S$GLB,, | Performed by: PHYSICIAN ASSISTANT

## 2024-11-18 PROCEDURE — 99999 PR PBB SHADOW E&M-EST. PATIENT-LVL III: CPT | Mod: PBBFAC,,, | Performed by: PHYSICIAN ASSISTANT

## 2024-11-18 NOTE — PROGRESS NOTES
Outpatient Psychiatry Follow-Up Visit (MD/NP)    11/18/2024    Clinical Status of Patient:  Outpatient (Ambulatory)    Chief Complaint:  Denny Atkinson is a 39 y.o. male who presents today for follow-up of anxiety.  Met with patient.      Interval History and Content of Current Session:  Interim Events/Subjective Report/Content of Current Session:  Denny is seen today for medication follow-up.  Reports that he is doing well.  Returned from his work trip/cruise.  States that it was very busy days, worked seven days a week, 12-14 hour days for five weeks.  He feels that mood and anxiety are reasonable with legitimate stressors that are ongoing at this time.  His in-laws will be coming for [facility] and then his family will be going to [place] for [place].  Had reassuring follow-up with Neurology.  Would like to continue current medications as prescribed.  Denies suicidal or homicidal ideation.  No other complaints today.    FROM PREVIOUS HPI  Denny is seen today for medication follow-up.  Unfortunately, he was not able to follow-up for his Neurology visit (provider left) so at this time, he has continued on medications related to vascular concerns earlier this year.  He does have scheduled follow up with Neurology in the [facility] system.  He will be gone for a month for research and would like to continue with sertraline and Abilify.  Consider Luvox upon arrival back.  Has been participating in ImTT with Barbra Acuna.  Unsure if it is profoundly helpful but going through the process.  Denies suicidal or homicidal ideation.  No other complaints today.    IMTT - with Barbra Acuna.   [phone] - call her to coordinate     Plan from neuro on chart review:   check on med interaction for fluxoxamine and plavix  repeat imaging  continue DAPT x 6 months  reassess after imaging   referral to  to look for connective tissue disorder   RTC september with dr. boateng.     He does note that he has had much  more generalized anxiety and comparison to just obsessive-compulsive symptoms.          11/18/2024     7:51 AM 9/6/2024     8:25 AM 7/31/2024     8:00 AM   GAD7   1. Feeling nervous, anxious, or on edge? 1  1  2    2. Not being able to stop or control worrying? 1  1  2    3. Worrying too much about different things? 1  1  2    4. Trouble relaxing? 1  1  2    5. Being so restless that it is hard to sit still? 1  0  1    6. Becoming easily annoyed or irritable? 1  0  1    7. Feeling afraid as if something awful might happen? 0  0  0    8. If you checked off any problems, how difficult have these problems made it for you to do your work, take care of things at home, or get along with other people? 1  1  1    MAHESH-7 Score 6  4 10       Patient-reported         11/18/2024   PHQ-9 Depression Patient Health Questionnaire   Over the last two weeks how often have you been bothered by little interest or pleasure in doing things 1    Over the last two weeks how often have you been bothered by feeling down, depressed or hopeless 0    Over the last two weeks how often have you been bothered by trouble falling or staying asleep, or sleeping too much 0    Over the last two weeks how often have you been bothered by feeling tired or having little energy 1    Over the last two weeks how often have you been bothered by a poor appetite or overeating 1    Over the last two weeks how often have you been bothered by feeling bad about yourself - or that you are a failure or have let yourself or your family down 1    Over the last two weeks how often have you been bothered by trouble concentrating on things, such as reading the newspaper or watching television 1    Over the last two weeks how often have you been bothered by moving or speaking so slowly that other people could have noticed. 0    Over the last two weeks how often have you been bothered by thoughts that you would be better off dead, or of hurting yourself 0    PHQ-9 Score 5         Patient-reported     Outpatient Psychiatry Initial Visit (MD/NP)     1/26/2022     Denny Atkinson, a 37 y.o. male, presenting for initial evaluation visit. Met with patient.     Reason for Encounter: Referral from Ben Dos Santos. Patient complains of OCD/anxiety.     History of Present Illness:   This is a 37-year-old male, past medical history of hyperlipidemia, who presents today for initial evaluation.  He reports that he is here due to significant amount of anxiety in particular obsessive-compulsive related symptoms.  He states he is obsessively worrying.  He is a  and manages many graduates students.  He reports this is a very high stress/high pressure job.  His wife is a professor as well.  He has a 2-year-old girl, this is their first child. He has been on escitalopram for many years.  He states that is been working pretty well but he still does endorse a significant amount of anxiety.  Has trouble with sleep.  He does state that he drinks too much coffee and also uses Nicorette gum is a stimulant.  He states he used to play basketball 3-4 nights per week prior to COVID and this helped him sleep much more.  He has been exercising some but it is not enough to help him with his sleep.  He states this was a major stress release.  He has struggled for a while with tasks at his job.  He endorses ruminating thoughts.  Will have detailed notes that he has to reread and add to them.  It is impacting his functioning at this time.  When he was younger, he used to do repetitive handwashing but is not doing this now.  He does find that his symptoms are impacting his day-to-day life.  Medications that he has tried include bupropion, sertraline, fluoxetine, paroxetine.  He reports that he did have symptoms of serotonin toxicity during a cross taper of medications.  He states he is sensitive to medications.  Has not been on tricyclic antidepressants or Luvox.  Unsure about trazodone.  He previously  participated in therapy for many years, interested in therapy referral today.  Discussed sleep hygiene in detail.  Denies suicidal or homicidal ideation.  No other complaints today.     Endorses prior problems with substance use disorders - does not want to be prescribed anything habit forming.     Depression symptoms:  Endorses difficulty with sleep, feeling tired or having little energy, overeating, trouble concentrating.  Denies suicidal ideation.  PHQ-9 seven, somewhat difficult     Anxiety symptoms:  GAD7 1/27/2022   1. Feeling nervous, anxious, or on edge? 1   2. Not being able to stop or control worrying? 3   3. Worrying too much about different things? 3   4. Trouble relaxing? 2   5. Being so restless that it is hard to sit still? 0   6. Becoming easily annoyed or irritable? 1   7. Feeling afraid as if something awful might happen? 0   8. If you checked off any problems, how difficult have these problems made it for you to do your work, take care of things at home, or get along with other people? 1   MAHESH-7 Score 10            Fina/Hypomania symptoms: denies  MDQ Scale 1/27/2022   you felt so good or so hyper that other people thought you were not your normal self or you were so hyper that you got into trouble? 0   you were so irritable that you shouted at people or started fights or arguments? 0   you felt much more self-confident than usual? 0   you got much less sleep than usual and found that you didn't really miss it? 0   you were more talkative or spoke much faster than usual? 0   thoughts raced through your head or you couldn't slow your mind down? 1   you were so easily distracted by things around you that you had trouble concentrating or staying on track? 1   you had more energy than usual? 0   you were much more active or did many more things than usual? 0   you were much more social or outgoing than usual, for example, you telephoned friends in the middle of the night? 0   you were much more  interested in sex than usual? 0   you did things that were unusual for you or that other people might have thought were excessive, foolish, or risky? 0   spending money got you or your family in trouble? 0   If you checked YES to more than one of the above, have several of these ever happened during the same period of time? 1   How much of a problem did any of these cause you - like being unable to work; having family, money or legal troubles; getting into arguments or fights? Moderate problem   Mood Disorder Questionnaire Score  2      Psychosis: denies     Attention/Concentration: fair     Body Image/Hx of eating disorders: denies, eating too much occasionally      Suicidal ideation and risk: denies suicidal thoughts, no access to guns, no hx of suicidal thoughts or attempts     Homicidal/Violient ideation and risk: denies     Sleep: poor sleep hygiene     Appetite: overeating     Past Psychiatric History:  Prior diagnoses: OCD, MAHESH, never been diagnosed with ADHD      Inpatient psychiatric treatment: denies     Outpatient psychiatric treatment: last saw psychiatrist, saw someone before Dr. Cast. Four or so years ago. Wasn't really thrilled. Was happy just on lexapro.      Prior medications: as described above     Current medications: lexapro 20mg daily     Prior suicide attempts: denies     Prior history self harm: denies     Prior psychotherapy: has participated in the past     Prior psychological testing: none     Allergies:       Review of patient's allergies indicates:   Allergen Reactions    Center-al house dust        Past Medical History:       Past Medical History:   Diagnosis Date    Allergy      Anxiety     Elevated cholesterol - not taking medicine      History TBI: denies  History seizures: denies     Past Surgical History:        Past Surgical History:   Procedure Laterality Date    FINGER SURGERY   2007    HERNIA REPAIR   1985      Family History:   Suicide: denies  Substance use: great grandfather    Bipolar disorder/Psychotic disorder: denies  Anxiety: brother, mom and dad  Depression: denies     Social History:  Childhood: born in [place]. Grew up in [place]. Went to college in [employer]. Raised by biological mother and father. Good relationship. They live in [place], [place]. One brother in [place] - younger brother. He's doing well. He has some anxiety, definitely not OCD.   Marital status: has been  for almost 10 years, supportive wife   Children: 2 year old daughter, August   Resides: in [place], [place]  Occupation: professor - [employer]   Hobbies: basketball, enjoys walking/hiking with the family, riding bikes, likes to be outside  Jehovah's witness: not religlious   Education level: PhD  : denies  Legal: denies  History of abuse/trauma: denies     Substance History:  Tobacco: nicorette gum - used to smoke cigarettes, quit smoking cigarettes probably 12 years ago. Gum - 3 or 4 pieces of day - at night.   Alcohol: no alcohol, when he was in high school would drink a lot. Has addictive personality.   Drug use: high school - opioids, not stimulants, used benzos, had to go treatment.   Caffeine: high caffeine - one of those yeti of coffee - make at home, community coffee      Rehab:  Prior/current AA: inpatient detox/rehab 20 years ago - several times between [age], before college     Review of Systems   PSYCHIATRIC: Pertinant items are noted in the narrative.  RESPIRATORY: No shortness of breath.  CARDIOVASCULAR: No tachycardia or chest pain.  GASTROINTESTINAL: No nausea, vomiting, pain, constipation or diarrhea.    Past Medical, Family and Social History: The patient's past medical, family and social history have been reviewed and updated as appropriate within the electronic medical record - see encounter notes.      Risk Parameters:  Patient reports no suicidal ideation  Patient reports no homicidal ideation  Patient reports no self-injurious behavior  Patient reports no violent behavior    Exam (detailed: at least 9  "elements; comprehensive: all 15 elements)   Constitutional  Vitals:  Most recent vital signs, dated less than 90 days prior to this appointment, were reviewed.     Vitals:    11/18/24 0813   BP: 124/82   Pulse: 60            General:  unremarkable, age appropriate     Musculoskeletal  Muscle Strength/Tone:  no dyskinesia   Gait & Station:  non-ataxic     Psychiatric  Speech:  no latency; no press   Mood & Affect:  "Okay"  mood-congruent   Thought Process:  normal and logical   Associations:  intact   Thought Content:  normal, no suicidality, no homicidality, delusions, or paranoia   Insight:  intact   Judgement: behavior is adequate to circumstances   Orientation:  grossly intact   Memory: intact for content of interview   Language: grossly intact   Attention Span & Concentration:  able to focus   Fund of Knowledge:  intact and appropriate to age and level of education     Assessment and Diagnosis   Status/Progress: Based on the examination today, the patient's problem(s) is/are adequately but not ideally controlled.  New problems have not been presented today.   Co-morbidities, Diagnostic uncertainty, and Lack of compliance are not complicating management of the primary condition.      General Impression:   OCD with good insight  MAHESH     Intervention/Counseling/Treatment Plan   Medication Management: The risks and benefits of medication were discussed with the patient.  Counseling provided with patient as follows: importance of compliance with chosen treatment options was emphasized, risks and benefits of treatment options, including medications, were discussed with the patient, risk factor reduction, prognosis    Denny is seen today for medication follow-up. Based on assessment:    Continue aripiprazole 2 mg once daily for augmentation of OCD symptoms.  Medication risks, side effects, benefits explained in detail. Discussed risks of tardive dyskinesia, drug induced parkinsonism, metabolic side effects, including " weight gain, neuroleptic malignant syndrome   Continue sertraline 100 mg daily for OCD symptoms.  Trial headspace for guided meditation.   Continue with Barbra Acuna for imtt  Provided information on [facility] in [place].  Consider naltrexone    Please go to emergency department if feeling as though you are a harm to yourself or others or if you are in crisis. Please call the clinic to report any worsening of symptoms or problems associated with medication.    Discussed with patient informed consent, risks vs. benefits, alternative treatments, side effect profile and the inherent unpredictability of individual responses to these treatments. The patient expresses understanding of the above and displays the capacity to agree with this current plan and had no other questions.    Discussed risk of serotonin syndrome with these medications. Symptoms of concern include agitation/restlessness, confusion, rapid heart rate/high blood pressure, dilated pupils, loss of muscle coordination, muscle rigidity, heavy sweating.      Return to Clinic: as scheduled, as needed

## 2024-12-14 DIAGNOSIS — E78.2 MIXED HYPERLIPIDEMIA: ICD-10-CM

## 2024-12-15 NOTE — TELEPHONE ENCOUNTER
Refill Routing Note   Medication(s) are not appropriate for processing by [facility] for the following reason(s):        Responsible provider unclear    [facility] action(s):  Defer      Medication Therapy Plan: Last ordered: 9/13/24 by Maria E Georges MD. Recent OV but no current order by PCP      Appointments  past 12m or future 3m with PCP    Date Provider   Last Visit   6/17/2024 Arnie Woods MD   Next Visit   Visit date not found Arnie Woods MD   ED visits in past 90 days: 0        Note composed:4:01 PM 12/15/2024

## 2024-12-16 RX ORDER — ROSUVASTATIN CALCIUM 10 MG/1
10 TABLET, COATED ORAL DAILY
Qty: 90 TABLET | Refills: 1 | Status: SHIPPED | OUTPATIENT
Start: 2024-12-16

## 2025-01-06 ENCOUNTER — HOSPITAL ENCOUNTER (EMERGENCY)
Facility: HOSPITAL | Age: 40
Discharge: HOME | End: 2025-01-06
Attending: STUDENT IN AN ORGANIZED HEALTH CARE EDUCATION/TRAINING PROGRAM
Payer: COMMERCIAL

## 2025-01-06 VITALS
TEMPERATURE: 98 F | SYSTOLIC BLOOD PRESSURE: 124 MMHG | HEIGHT: 74 IN | BODY MASS INDEX: 28.88 KG/M2 | OXYGEN SATURATION: 96 % | HEART RATE: 67 BPM | DIASTOLIC BLOOD PRESSURE: 74 MMHG | RESPIRATION RATE: 16 BRPM | WEIGHT: 225 LBS

## 2025-01-06 DIAGNOSIS — R41.0 CONFUSION: ICD-10-CM

## 2025-01-06 DIAGNOSIS — T50.905A MEDICATION SIDE EFFECT, INITIAL ENCOUNTER: Primary | ICD-10-CM

## 2025-01-06 LAB
ALBUMIN SERPL BCP-MCNC: 4.4 G/DL (ref 3.5–5.2)
ALP SERPL-CCNC: 72 U/L (ref 55–135)
ALT SERPL W/O P-5'-P-CCNC: 47 U/L (ref 10–44)
ANION GAP SERPL CALC-SCNC: 9 MMOL/L (ref 8–16)
AST SERPL-CCNC: 25 U/L (ref 10–40)
BASOPHILS # BLD AUTO: 0.03 K/UL (ref 0–0.2)
BASOPHILS NFR BLD: 0.5 % (ref 0–1.9)
BILIRUB SERPL-MCNC: 0.6 MG/DL (ref 0.1–1)
BILIRUB UR QL STRIP: NEGATIVE
BUN SERPL-MCNC: 14 MG/DL (ref 6–20)
CALCIUM SERPL-MCNC: 9.1 MG/DL (ref 8.7–10.5)
CHLORIDE SERPL-SCNC: 106 MMOL/L (ref 95–110)
CLARITY UR: CLEAR
CO2 SERPL-SCNC: 27 MMOL/L (ref 23–29)
COLOR UR: COLORLESS
CREAT SERPL-MCNC: 0.8 MG/DL (ref 0.5–1.4)
DIFFERENTIAL METHOD BLD: ABNORMAL
EOSINOPHIL # BLD AUTO: 0.3 K/UL (ref 0–0.5)
EOSINOPHIL NFR BLD: 5.7 % (ref 0–8)
ERYTHROCYTE [DISTWIDTH] IN BLOOD BY AUTOMATED COUNT: 13.4 % (ref 11.5–14.5)
EST. GFR  (NO RACE VARIABLE): >60 ML/MIN/1.73 M^2
GLUCOSE SERPL-MCNC: 96 MG/DL (ref 70–110)
GLUCOSE UR QL STRIP: NEGATIVE
HCT VFR BLD AUTO: 38 % (ref 40–54)
HGB BLD-MCNC: 12.7 G/DL (ref 14–18)
HGB UR QL STRIP: NEGATIVE
IMM GRANULOCYTES # BLD AUTO: 0.03 K/UL (ref 0–0.04)
IMM GRANULOCYTES NFR BLD AUTO: 0.5 % (ref 0–0.5)
KETONES UR QL STRIP: NEGATIVE
LEUKOCYTE ESTERASE UR QL STRIP: NEGATIVE
LYMPHOCYTES # BLD AUTO: 2.4 K/UL (ref 1–4.8)
LYMPHOCYTES NFR BLD: 40 % (ref 18–48)
MAGNESIUM SERPL-MCNC: 2 MG/DL (ref 1.6–2.6)
MCH RBC QN AUTO: 30.8 PG (ref 27–31)
MCHC RBC AUTO-ENTMCNC: 33.4 G/DL (ref 32–36)
MCV RBC AUTO: 92 FL (ref 82–98)
MONOCYTES # BLD AUTO: 0.7 K/UL (ref 0.3–1)
MONOCYTES NFR BLD: 11.8 % (ref 4–15)
NEUTROPHILS # BLD AUTO: 2.5 K/UL (ref 1.8–7.7)
NEUTROPHILS NFR BLD: 41.5 % (ref 38–73)
NITRITE UR QL STRIP: NEGATIVE
NRBC BLD-RTO: 0 /100 WBC
PH UR STRIP: 7 [PH] (ref 5–8)
PLATELET # BLD AUTO: 194 K/UL (ref 150–450)
PMV BLD AUTO: 9.8 FL (ref 9.2–12.9)
POTASSIUM SERPL-SCNC: 3.9 MMOL/L (ref 3.5–5.1)
PROT SERPL-MCNC: 7.2 G/DL (ref 6–8.4)
PROT UR QL STRIP: NEGATIVE
RBC # BLD AUTO: 4.12 M/UL (ref 4.6–6.2)
SODIUM SERPL-SCNC: 142 MMOL/L (ref 136–145)
SP GR UR STRIP: <1.005 (ref 1–1.03)
TSH SERPL DL<=0.005 MIU/L-ACNC: 0.8 UIU/ML (ref 0.34–5.6)
URN SPEC COLLECT METH UR: ABNORMAL
UROBILINOGEN UR STRIP-ACNC: NEGATIVE EU/DL
WBC # BLD AUTO: 5.95 K/UL (ref 3.9–12.7)

## 2025-01-06 PROCEDURE — 84443 ASSAY THYROID STIM HORMONE: CPT | Performed by: STUDENT IN AN ORGANIZED HEALTH CARE EDUCATION/TRAINING PROGRAM

## 2025-01-06 PROCEDURE — 99284 EMERGENCY DEPT VISIT MOD MDM: CPT | Mod: 25

## 2025-01-06 PROCEDURE — 83735 ASSAY OF MAGNESIUM: CPT | Performed by: PHYSICIAN ASSISTANT

## 2025-01-06 PROCEDURE — 81003 URINALYSIS AUTO W/O SCOPE: CPT | Performed by: PHYSICIAN ASSISTANT

## 2025-01-06 PROCEDURE — 85025 COMPLETE CBC W/AUTO DIFF WBC: CPT | Performed by: PHYSICIAN ASSISTANT

## 2025-01-06 PROCEDURE — 93005 ELECTROCARDIOGRAM TRACING: CPT | Performed by: INTERNAL MEDICINE

## 2025-01-06 PROCEDURE — 93010 ELECTROCARDIOGRAM REPORT: CPT | Mod: ,,, | Performed by: INTERNAL MEDICINE

## 2025-01-06 PROCEDURE — 80053 COMPREHEN METABOLIC PANEL: CPT | Performed by: PHYSICIAN ASSISTANT

## 2025-01-07 NOTE — DISCHARGE INSTRUCTIONS
Please return to the emergency department if you have new or worsening symptoms.  Follow up with the primary care doctor further evaluation.  Please take your Zoloft as previously prescribed.  Do not do catch up doses.

## 2025-01-07 NOTE — FIRST PROVIDER EVALUATION
Emergency Department TeleTriage Encounter Note      CHIEF COMPLAINT    Chief Complaint   Patient presents with    Altered Mental Status     Wife states that patient has had confusion and bizarre behavior x 2 days. Patient has been off Zoloft for 2 weeks and restarted it a few days ago.        VITAL SIGNS   Initial Vitals [01/06/25 1908]   BP Pulse Resp Temp SpO2   125/82 70 16 97.5 °F (36.4 °C) 96 %      MAP       --            ALLERGIES    Review of patient's allergies indicates:   Allergen Reactions    Center-al house dust        PROVIDER TRIAGE NOTE  40 yo M to the ED with wife. Pt states nothing is wrong. Wife confused about ongoing pt confusion and lethargy for the past few days.       ORDERS  Labs Reviewed - No data to display    ED Orders (720h ago, onward)      None              Virtual Visit Note: The provider triage portion of this emergency department evaluation and documentation was performed via [id number], a HIPAA-compliant telemedicine application, in concert with a tele-presenter in the room. A face to face patient evaluation with one of my colleagues will occur once the patient is placed in an emergency department room.      DISCLAIMER: This note was prepared with [employer] voice recognition transcription software. Garbled syntax, mangled pronouns, and other bizarre constructions may be attributed to that software system.

## 2025-01-07 NOTE — ED PROVIDER NOTES
Encounter Date: 1/6/2025       History     Chief Complaint   Patient presents with    Altered Mental Status     Wife states that patient has had confusion and bizarre behavior x 2 days. Patient has been off Zoloft for 2 weeks and restarted it a few days ago.      HPI    Denny Atkinson is a 39 y.o. male with a past medical history of a previous stroke secondary to a vertebral artery dissection as well as anxiety that presents emergency department for evaluation of altered mental status.  Per wife, patient has had mildly increased confusion and delayed speech for the past 2 days.  He was at work today when students noted that he was acting strangely.  Wife noted that he had some bizarre symptoms when he had a stroke a year ago and was concerned about this, so she was brought him to the emergency department for further evaluation.  During his previous stroke, he was complaining of vertigo, recurrent vomiting, and inability to ambulate.  He has been ambulating fine, has not had any vomiting, and is not vertiginous.  Upon further questioning, he states that he was inconsistently taking his sertraline and wanted to take additional doses to get him back into the therapeutic range.  He took a total of 3 pills per day for the past 2 days.  Following this, his wife began to notice the change in behavior.  He is answering questions appropriately but has a delayed speech pattern for her.  Denies numbness, weakness, vision changes, headache, and fever.    Review of patient's allergies indicates:   Allergen Reactions    Center-al house dust      Past Medical History:   Diagnosis Date    Allergy     Anxiety      Past Surgical History:   Procedure Laterality Date    FINGER SURGERY  2007    HERNIA REPAIR  1985     Family History   Problem Relation Name Age of Onset    Hypertension Father      Hyperlipidemia Father      Hypertension Brother      Hyperlipidemia Brother      Hypertension Paternal Grandmother      Cancer Paternal  Grandfather      Hypertension Paternal Grandfather      Hyperlipidemia Paternal Grandfather       Social History     Tobacco Use    Smoking status: Former     Current packs/day: 0.00     Types: Cigarettes     Quit date:      Years since quittin.0     Passive exposure: Past    Smokeless tobacco: Never   Substance Use Topics    Alcohol use: No    Drug use: No     Review of Systems   Constitutional:  Negative for fever.   HENT:  Negative for sore throat.    Eyes:  Negative for visual disturbance.   Respiratory:  Negative for shortness of breath.    Cardiovascular:  Negative for chest pain.   Gastrointestinal:  Negative for nausea and vomiting.   Genitourinary:  Negative for dysuria.   Musculoskeletal:  Negative for back pain.   Skin:  Negative for rash.   Neurological:  Negative for dizziness, weakness, numbness and headaches.   Hematological:  Does not bruise/bleed easily.   Psychiatric/Behavioral:  Positive for confusion.        Physical Exam     Initial Vitals [25 1908]   BP Pulse Resp Temp SpO2   125/82 70 16 97.5 °F (36.4 °C) 96 %      MAP       --         Physical Exam    Nursing note and vitals reviewed.  Constitutional: He appears well-developed and well-nourished.   HENT:   Head: Normocephalic and atraumatic.   Eyes: EOM are normal. Pupils are equal, round, and reactive to light.   Neck:   Normal range of motion.  Cardiovascular:  Normal rate and regular rhythm.           Pulmonary/Chest: Breath sounds normal. No respiratory distress.   Abdominal: Abdomen is soft. He exhibits no distension.   Musculoskeletal:         General: Normal range of motion.      Cervical back: Normal range of motion.     Neurological: He is alert and oriented to person, place, and time. He has normal strength. No cranial nerve deficit or sensory deficit. GCS score is 15. GCS eye subscore is 4. GCS verbal subscore is 5. GCS motor subscore is 6.   Ambulatory with a steady gait.  No truncal ataxia.  Normal strength and  sensation throughout.  No cranial nerve deficits.  No nystagmus.  Answering questions appropriately.   Skin: Capillary refill takes less than 2 seconds.   Psychiatric: He has a normal mood and affect.         ED Course   Procedures  Labs Reviewed   CBC W/ AUTO DIFFERENTIAL - Abnormal       Result Value    WBC 5.95      RBC 4.12 (*)     Hemoglobin 12.7 (*)     Hematocrit 38.0 (*)     MCV 92      MCH 30.8      MCHC 33.4      RDW 13.4      Platelets 194      MPV 9.8      Immature Granulocytes 0.5      Gran # (ANC) 2.5      Immature Grans (Abs) 0.03      Lymph # 2.4      Mono # 0.7      Eos # 0.3      Baso # 0.03      nRBC 0      Gran % 41.5      Lymph % 40.0      Mono % 11.8      Eosinophil % 5.7      Basophil % 0.5      Differential Method Automated     COMPREHENSIVE METABOLIC PANEL - Abnormal    Sodium 142      Potassium 3.9      Chloride 106      CO2 27      Glucose 96      BUN 14      Creatinine 0.8      Calcium 9.1      Total Protein 7.2      Albumin 4.4      Total Bilirubin 0.6      Alkaline Phosphatase 72      AST 25      ALT 47 (*)     eGFR >60.0      Anion Gap 9     URINALYSIS, REFLEX TO URINE CULTURE - Abnormal    Specimen UA Urine, Clean Catch      Color, UA Colorless (*)     Appearance, UA Clear      pH, UA 7.0      Specific Gravity, UA <1.005 (*)     Protein, UA Negative      Glucose, UA Negative      Ketones, UA Negative      Bilirubin (UA) Negative      Occult Blood UA Negative      Nitrite, UA Negative      Urobilinogen, UA Negative      Leukocytes, UA Negative      Narrative:     Specimen Source->Urine   MAGNESIUM    Magnesium 2.0     TSH   TSH    TSH 0.795       EKG Readings: (Independently Interpreted)   Rhythm: Normal Sinus Rhythm. Heart Rate: 73. Ectopy: No Ectopy. Conduction: Normal. ST Segments: Normal ST Segments. T Waves: Normal. Clinical Impression: Normal Sinus Rhythm   Incomplete right bundle-branch block.       Imaging Results    None          Medications - No data to display  Medical  Decision Making  Denny Atkinson is a 39 y.o. male with a past medical history of a previous stroke secondary to a vertebral artery dissection as well as anxiety that presents emergency department for evaluation of altered mental status.  Vitals were stable.  Exam as above.  Presentation most consistent with medication side effect.  Considered CVA, but he is overall well-appearing in his exam is reassuring.  He does not have any focal neurologic deficits in his previous stroke-like symptoms from 1 year ago are inconsistent with his current presentation.  I have a low suspicion for CVA given this.  Labs were overall reassuring.  Stable for discharge at this time.  Counseled on proper usage of sertraline and management of medications.  Counseled on signs to look for serotonin syndrome.  He is not currently display any symptoms consistent with serotonin syndrome.  Return precautions given.  Instructed to follow up with PCP    Amount and/or Complexity of Data Reviewed  Labs: ordered.                                      Clinical Impression:  Final diagnoses:  [R41.0] Confusion  [T50.905A] Medication side effect, initial encounter (Primary)          ED Disposition Condition    Discharge           ED Prescriptions    None       Follow-up Information       Follow up With Specialties Details Why Contact Info Additional Information    [facility] - Emergency Dept Emergency Medicine  If symptoms worsen, As needed [address] [facility] [id number] [phone] 1st floor    Arnie Woods MD Family Medicine Call in 2 days  [address] [facility] [id number] [phone]                Ciro Kaiser MD  01/06/25 2417

## 2025-01-07 NOTE — ED NOTES
AVS given, discharge instructions and medications reviewed. VSS at discharge. Pt with family member at bedside for ride home. NADN upon discharge, all questions answered. Patient ambulated out or SEPIDEH with steady even gait.

## 2025-01-13 LAB
OHS QRS DURATION: 104 MS
OHS QTC CALCULATION: 442 MS

## 2025-01-16 ENCOUNTER — OFFICE VISIT (OUTPATIENT)
Dept: PSYCHIATRY | Facility: CLINIC | Age: 40
End: 2025-01-16
Payer: COMMERCIAL

## 2025-01-16 VITALS
WEIGHT: 242.94 LBS | DIASTOLIC BLOOD PRESSURE: 73 MMHG | BODY MASS INDEX: 31.18 KG/M2 | HEART RATE: 71 BPM | SYSTOLIC BLOOD PRESSURE: 115 MMHG | HEIGHT: 74 IN

## 2025-01-16 DIAGNOSIS — F42.9 OBSESSIVE-COMPULSIVE DISORDER, UNSPECIFIED TYPE: Primary | ICD-10-CM

## 2025-01-16 DIAGNOSIS — F41.1 GAD (GENERALIZED ANXIETY DISORDER): ICD-10-CM

## 2025-01-16 PROCEDURE — 3078F DIAST BP <80 MM HG: CPT | Mod: CPTII,S$GLB,, | Performed by: PHYSICIAN ASSISTANT

## 2025-01-16 PROCEDURE — 1160F RVW MEDS BY RX/DR IN RCRD: CPT | Mod: CPTII,S$GLB,, | Performed by: PHYSICIAN ASSISTANT

## 2025-01-16 PROCEDURE — G2211 COMPLEX E/M VISIT ADD ON: HCPCS | Mod: S$GLB,,, | Performed by: PHYSICIAN ASSISTANT

## 2025-01-16 PROCEDURE — 3074F SYST BP LT 130 MM HG: CPT | Mod: CPTII,S$GLB,, | Performed by: PHYSICIAN ASSISTANT

## 2025-01-16 PROCEDURE — 99214 OFFICE O/P EST MOD 30 MIN: CPT | Mod: S$GLB,,, | Performed by: PHYSICIAN ASSISTANT

## 2025-01-16 PROCEDURE — 3008F BODY MASS INDEX DOCD: CPT | Mod: CPTII,S$GLB,, | Performed by: PHYSICIAN ASSISTANT

## 2025-01-16 PROCEDURE — 99999 PR PBB SHADOW E&M-EST. PATIENT-LVL III: CPT | Mod: PBBFAC,,, | Performed by: PHYSICIAN ASSISTANT

## 2025-01-16 PROCEDURE — 1159F MED LIST DOCD IN RCRD: CPT | Mod: CPTII,S$GLB,, | Performed by: PHYSICIAN ASSISTANT

## 2025-01-16 NOTE — PROGRESS NOTES
"Outpatient Psychiatry Follow-Up Visit (MD/NP)    1/16/2025    Clinical Status of Patient:  Outpatient (Ambulatory)    Chief Complaint:  Denny Atkinson is a 39 y.o. male who presents today for follow-up of anxiety.  Met with patient.      Interval History and Content of Current Session:  Interim Events/Subjective Report/Content of Current Session:  Denny is seen today for medication follow up. Provides recent updates. States that he had forgotten to take sertraline for a few days and was attempting to "catch up" with his dosages. His wife was concerned due to some confusion he was having, so he went to the ER on 1/6/2025. ER note reviewed and reassuring. He reports all symptoms have improved at this time. Enjoyed his trip to [place] with his family. Has been applying for jobs in [place] and would like to relocate to [place]. Enjoyed his holiday. Feels his medications are supporting him and would like to continue as prescribed. Plans to resume therapy shortly - [facility]bra, private practice ([facility]). Denies SI/HI.    FROM PREVIOUS HPI  Denny is seen today for medication follow-up.  Reports that he is doing well.  Returned from his work trip/cruise.  States that it was very busy days, worked seven days a week, 12-14 hour days for five weeks.  He feels that mood and anxiety are reasonable with legitimate stressors that are ongoing at this time.  [place] [place] [facility] and then his family will be going to [place] for [place].  Had reassuring follow-up with Neurology.  Would like to continue current medications as prescribed.  Denies suicidal or homicidal ideation.  No other complaints today.    IMTT - with Barbra Acuna.   [phone] - call her to coordinate     Plan from neuro on chart review:   check on med interaction for fluxoxamine and plavix  repeat imaging  continue DAPT x 6 months  reassess after imaging   referral to  to look for connective tissue disorder   RTC september with dr. boateng.     He " does note that he has had much more generalized anxiety and comparison to just obsessive-compulsive symptoms.          1/16/2025     7:55 AM 11/18/2024     7:51 AM 9/6/2024     8:25 AM   GAD7   1. Feeling nervous, anxious, or on edge? 1  1  1    2. Not being able to stop or control worrying? 1  1  1    3. Worrying too much about different things? 1  1  1    4. Trouble relaxing? 1  1  1    5. Being so restless that it is hard to sit still? 1  1  0    6. Becoming easily annoyed or irritable? 1  1  0    7. Feeling afraid as if something awful might happen? 0  0  0    8. If you checked off any problems, how difficult have these problems made it for you to do your work, take care of things at home, or get along with other people? 1  1  1    MAHESH-7 Score 6  6  4       Patient-reported         1/16/2025   PHQ-9 Depression Patient Health Questionnaire   Over the last two weeks how often have you been bothered by little interest or pleasure in doing things 1    Over the last two weeks how often have you been bothered by feeling down, depressed or hopeless 1    Over the last two weeks how often have you been bothered by trouble falling or staying asleep, or sleeping too much 1    Over the last two weeks how often have you been bothered by feeling tired or having little energy 1    Over the last two weeks how often have you been bothered by a poor appetite or overeating 1    Over the last two weeks how often have you been bothered by feeling bad about yourself - or that you are a failure or have let yourself or your family down 1    Over the last two weeks how often have you been bothered by trouble concentrating on things, such as reading the newspaper or watching television 1    Over the last two weeks how often have you been bothered by moving or speaking so slowly that other people could have noticed. 1    Over the last two weeks how often have you been bothered by thoughts that you would be better off dead, or of hurting  yourself 0    PHQ-9 Score 8        Patient-reported     Outpatient Psychiatry Initial Visit (MD/NP)     1/26/2022     Denny Atkinson, a 37 y.o. male, presenting for initial evaluation visit. Met with patient.     Reason for Encounter: Referral from Ben Dos Santos. Patient complains of OCD/anxiety.     History of Present Illness:   This is a 37-year-old male, past medical history of hyperlipidemia, who presents today for initial evaluation.  He reports that he is here due to significant amount of anxiety in particular obsessive-compulsive related symptoms.  He states he is obsessively worrying.  He is a  and manages many graduates students.  He reports this is a very high stress/high pressure job.  His wife is a professor as well.  He has a 2-year-old girl, this is their first child. He has been on escitalopram for many years.  He states that is been working pretty well but he still does endorse a significant amount of anxiety.  Has trouble with sleep.  He does state that he drinks too much coffee and also uses Nicorette gum is a stimulant.  He states he used to play basketball 3-4 nights per week prior to COVID and this helped him sleep much more.  He has been exercising some but it is not enough to help him with his sleep.  He states this was a major stress release.  He has struggled for a while with tasks at his job.  He endorses ruminating thoughts.  Will have detailed notes that he has to reread and add to them.  It is impacting his functioning at this time.  When he was younger, he used to do repetitive handwashing but is not doing this now.  He does find that his symptoms are impacting his day-to-day life.  Medications that he has tried include bupropion, sertraline, fluoxetine, paroxetine.  He reports that he did have symptoms of serotonin toxicity during a cross taper of medications.  He states he is sensitive to medications.  Has not been on tricyclic antidepressants or Luvox.  Unsure  about trazodone.  He previously participated in therapy for many years, interested in therapy referral today.  Discussed sleep hygiene in detail.  Denies suicidal or homicidal ideation.  No other complaints today.     Endorses prior problems with substance use disorders - does not want to be prescribed anything habit forming.     Depression symptoms:  Endorses difficulty with sleep, feeling tired or having little energy, overeating, trouble concentrating.  Denies suicidal ideation.  PHQ-9 seven, somewhat difficult     Anxiety symptoms:  GAD7 1/27/2022   1. Feeling nervous, anxious, or on edge? 1   2. Not being able to stop or control worrying? 3   3. Worrying too much about different things? 3   4. Trouble relaxing? 2   5. Being so restless that it is hard to sit still? 0   6. Becoming easily annoyed or irritable? 1   7. Feeling afraid as if something awful might happen? 0   8. If you checked off any problems, how difficult have these problems made it for you to do your work, take care of things at home, or get along with other people? 1   MAHESH-7 Score 10            Fina/Hypomania symptoms: denies  MDQ Scale 1/27/2022   you felt so good or so hyper that other people thought you were not your normal self or you were so hyper that you got into trouble? 0   you were so irritable that you shouted at people or started fights or arguments? 0   you felt much more self-confident than usual? 0   you got much less sleep than usual and found that you didn't really miss it? 0   you were more talkative or spoke much faster than usual? 0   thoughts raced through your head or you couldn't slow your mind down? 1   you were so easily distracted by things around you that you had trouble concentrating or staying on track? 1   you had more energy than usual? 0   you were much more active or did many more things than usual? 0   you were much more social or outgoing than usual, for example, you telephoned friends in the middle of the  night? 0   you were much more interested in sex than usual? 0   you did things that were unusual for you or that other people might have thought were excessive, foolish, or risky? 0   spending money got you or your family in trouble? 0   If you checked YES to more than one of the above, have several of these ever happened during the same period of time? 1   How much of a problem did any of these cause you - like being unable to work; having family, money or legal troubles; getting into arguments or fights? Moderate problem   Mood Disorder Questionnaire Score  2      Psychosis: denies     Attention/Concentration: fair     Body Image/Hx of eating disorders: denies, eating too much occasionally      Suicidal ideation and risk: denies suicidal thoughts, no access to guns, no hx of suicidal thoughts or attempts     Homicidal/Violient ideation and risk: denies     Sleep: poor sleep hygiene     Appetite: overeating     Past Psychiatric History:  Prior diagnoses: OCD, MAHESH, never been diagnosed with ADHD      Inpatient psychiatric treatment: denies     Outpatient psychiatric treatment: last saw psychiatrist, saw someone before Dr. Cast. Four or so years ago. Wasn't really thrilled. Was happy just on lexapro.      Prior medications: as described above     Current medications: lexapro 20mg daily     Prior suicide attempts: denies     Prior history self harm: denies     Prior psychotherapy: has participated in the past     Prior psychological testing: none     Allergies:       Review of patient's allergies indicates:   Allergen Reactions    Center-al house dust        Past Medical History:       Past Medical History:   Diagnosis Date    Allergy      Anxiety     Elevated cholesterol - not taking medicine      History TBI: denies  History seizures: denies     Past Surgical History:        Past Surgical History:   Procedure Laterality Date    FINGER SURGERY   2007    HERNIA REPAIR   1985      Family History:   Suicide:  denies  Substance use: great grandfather   Bipolar disorder/Psychotic disorder: denies  Anxiety: brother, mom and dad  Depression: denies     Social History:  Childhood: born in [place]. Grew up in [place]. Went to college in [employer]. Raised by biological mother and father. Good relationship. They live in [place], [place]. One brother in [place] - younger brother. He's doing well. He has some anxiety, definitely not OCD.   Marital status: has been  for almost 10 years, supportive wife   Children: 2 year old daughter, August   Resides: in [place], [place]  Occupation: professor - [employer]   Hobbies: basketball, enjoys walking/hiking with the family, riding bikes, likes to be outside  Yarsanism: not religlious   Education level: PhD  : denies  Legal: denies  History of abuse/trauma: denies     Substance History:  Tobacco: nicorette gum - used to smoke cigarettes, quit smoking cigarettes probably 12 years ago. Gum - 3 or 4 pieces of day - at night.   Alcohol: no alcohol, when he was in high school would drink a lot. Has addictive personality.   Drug use: high school - opioids, not stimulants, used benzos, had to go treatment.   Caffeine: high caffeine - one of those yeti of coffee - make at home, community coffee      Rehab:  Prior/current AA: inpatient detox/rehab 20 years ago - several times between [age], before college     Review of Systems   PSYCHIATRIC: Pertinant items are noted in the narrative.  RESPIRATORY: No shortness of breath.  CARDIOVASCULAR: No tachycardia or chest pain.  GASTROINTESTINAL: No nausea, vomiting, pain, constipation or diarrhea.    Past Medical, Family and Social History: The patient's past medical, family and social history have been reviewed and updated as appropriate within the electronic medical record - see encounter notes.      Risk Parameters:  Patient reports no suicidal ideation  Patient reports no homicidal ideation  Patient reports no self-injurious behavior  Patient reports no violent  behavior    Exam (detailed: at least 9 elements; comprehensive: all 15 elements)   Constitutional  Vitals:  Most recent vital signs, dated less than 90 days prior to this appointment, were reviewed.     Vitals:    01/16/25 0759   BP: 115/73   Pulse: 71        General:  unremarkable, age appropriate     Musculoskeletal  Muscle Strength/Tone:  no dyskinesia   Gait & Station:  non-ataxic     Psychiatric  Speech:  no latency; no press   Mood & Affect:  appropriate   Thought Process:  normal and logical   Associations:  intact   Thought Content:  normal, no suicidality, no homicidality, delusions, or paranoia   Insight:  intact   Judgement: behavior is adequate to circumstances   Orientation:  grossly intact   Memory: intact for content of interview   Language: grossly intact   Attention Span & Concentration:  able to focus   Fund of Knowledge:  intact and appropriate to age and level of education     Assessment and Diagnosis   Status/Progress: Based on the examination today, the patient's problem(s) is/are adequately but not ideally controlled.  New problems have not been presented today.   Co-morbidities, Diagnostic uncertainty, and Lack of compliance are not complicating management of the primary condition.      General Impression:   OCD with good insight  MAHESH     Intervention/Counseling/Treatment Plan   Medication Management: The risks and benefits of medication were discussed with the patient.  Counseling provided with patient as follows: importance of compliance with chosen treatment options was emphasized, risks and benefits of treatment options, including medications, were discussed with the patient, risk factor reduction, prognosis    Denny is seen today for medication follow-up. Based on assessment:    Continue aripiprazole 2 mg once daily for augmentation of OCD symptoms.  Medication risks, side effects, benefits explained in detail. Discussed risks of tardive dyskinesia, drug induced parkinsonism, metabolic  side effects, including weight gain, neuroleptic malignant syndrome   Continue sertraline 100 mg daily for OCD symptoms.  Trial headspace for guided meditation.   Continue with Barbra Acuna for imtt  Provided information on [facility] in [place].  Consider naltrexone  Due for labs in March of this year - monitoring labs are WNL.    Please go to emergency department if feeling as though you are a harm to yourself or others or if you are in crisis. Please call the clinic to report any worsening of symptoms or problems associated with medication.    Discussed with patient informed consent, risks vs. benefits, alternative treatments, side effect profile and the inherent unpredictability of individual responses to these treatments. The patient expresses understanding of the above and displays the capacity to agree with this current plan and had no other questions.    Discussed risk of serotonin syndrome with these medications. Symptoms of concern include agitation/restlessness, confusion, rapid heart rate/high blood pressure, dilated pupils, loss of muscle coordination, muscle rigidity, heavy sweating.      Return to Clinic: as scheduled, as needed

## 2025-02-28 DIAGNOSIS — F42.9 OBSESSIVE-COMPULSIVE DISORDER, UNSPECIFIED TYPE: ICD-10-CM

## 2025-02-28 RX ORDER — ARIPIPRAZOLE 2 MG/1
2 TABLET ORAL
Qty: 90 TABLET | Refills: 0 | Status: SHIPPED | OUTPATIENT
Start: 2025-02-28

## 2025-02-28 RX ORDER — ARIPIPRAZOLE 2 MG/1
2 TABLET ORAL DAILY
Qty: 90 TABLET | Refills: 1 | Status: SHIPPED | OUTPATIENT
Start: 2025-02-28

## 2025-03-24 DIAGNOSIS — F42.9 OBSESSIVE-COMPULSIVE DISORDER, UNSPECIFIED TYPE: ICD-10-CM

## 2025-03-25 DIAGNOSIS — E78.2 MIXED HYPERLIPIDEMIA: ICD-10-CM

## 2025-03-25 RX ORDER — ROSUVASTATIN CALCIUM 10 MG/1
10 TABLET, COATED ORAL
Qty: 90 TABLET | Refills: 1 | Status: SHIPPED | OUTPATIENT
Start: 2025-03-25

## 2025-03-25 RX ORDER — SERTRALINE HYDROCHLORIDE 100 MG/1
100 TABLET, FILM COATED ORAL DAILY
Qty: 90 TABLET | Refills: 1 | Status: SHIPPED | OUTPATIENT
Start: 2025-03-25

## 2025-03-25 NOTE — TELEPHONE ENCOUNTER
Care Due:                  Date            Visit Type   Department     Provider  --------------------------------------------------------------------------------                                EP -                              PRIMARY      [facility]  Last Visit: 06-      CARE ([facility])   GWENDOLYN Woods                              EP -                              PRIMARY      [facility]  Next Visit: 06-      CARE ([facility])   MEDICINE       Arnie Woods                                                            Last  Test          Frequency    Reason                     Performed    Due Date  --------------------------------------------------------------------------------    Lipid Panel.  12 months..  rosuvastatin.............  05- 04-    Health [facility] Embedded Care Due Messages. Reference number: [id number].   3/25/2025 12:29:42 PM CDT

## 2025-04-09 ENCOUNTER — OFFICE VISIT (OUTPATIENT)
Dept: PSYCHIATRY | Facility: CLINIC | Age: 40
End: 2025-04-09
Payer: COMMERCIAL

## 2025-04-09 VITALS
HEART RATE: 62 BPM | SYSTOLIC BLOOD PRESSURE: 127 MMHG | WEIGHT: 225 LBS | BODY MASS INDEX: 28.88 KG/M2 | DIASTOLIC BLOOD PRESSURE: 88 MMHG | HEIGHT: 74 IN

## 2025-04-09 DIAGNOSIS — F42.9 OBSESSIVE-COMPULSIVE DISORDER, UNSPECIFIED TYPE: Primary | ICD-10-CM

## 2025-04-09 DIAGNOSIS — F41.1 GAD (GENERALIZED ANXIETY DISORDER): ICD-10-CM

## 2025-04-09 PROCEDURE — 99999 PR PBB SHADOW E&M-EST. PATIENT-LVL III: CPT | Mod: PBBFAC,,, | Performed by: PHYSICIAN ASSISTANT

## 2025-04-09 PROCEDURE — 1159F MED LIST DOCD IN RCRD: CPT | Mod: CPTII,S$GLB,, | Performed by: PHYSICIAN ASSISTANT

## 2025-04-09 PROCEDURE — 99214 OFFICE O/P EST MOD 30 MIN: CPT | Mod: S$GLB,,, | Performed by: PHYSICIAN ASSISTANT

## 2025-04-09 PROCEDURE — 3008F BODY MASS INDEX DOCD: CPT | Mod: CPTII,S$GLB,, | Performed by: PHYSICIAN ASSISTANT

## 2025-04-09 PROCEDURE — 3079F DIAST BP 80-89 MM HG: CPT | Mod: CPTII,S$GLB,, | Performed by: PHYSICIAN ASSISTANT

## 2025-04-09 PROCEDURE — G2211 COMPLEX E/M VISIT ADD ON: HCPCS | Mod: S$GLB,,, | Performed by: PHYSICIAN ASSISTANT

## 2025-04-09 PROCEDURE — 1160F RVW MEDS BY RX/DR IN RCRD: CPT | Mod: CPTII,S$GLB,, | Performed by: PHYSICIAN ASSISTANT

## 2025-04-09 PROCEDURE — 3074F SYST BP LT 130 MM HG: CPT | Mod: CPTII,S$GLB,, | Performed by: PHYSICIAN ASSISTANT

## 2025-04-09 NOTE — PATIENT INSTRUCTIONS
[facility] - prepared meals     Please go to emergency department if feeling as though you are a harm to yourself or others or if you are in crisis. Please call the clinic to report any worsening of symptoms or problems associated with medication.

## 2025-04-09 NOTE — PROGRESS NOTES
"Outpatient Psychiatry Follow-Up Visit (MD/NP)    4/9/2025    Clinical Status of Patient:  Outpatient (Ambulatory)    Chief Complaint:  Denny Atkinson is a 40 y.o. male who presents today for follow-up of anxiety.  Met with patient. Pt seen with Maldonado Clements, PA student, who assisted with documentation.    Interval History and Content of Current Session:  Interim Events/Subjective Report/Content of Current Session:  Denny is seen today for medication follow up. Reports that he is doing well. States that his family will remain in [place] after recent job search. He received a phone call from the [facility] letting him know that he was second in line for the job, feels content with not getting the position. States applying for jobs as been a humbling experience as Denny and his wife were both applying. Worried about future trips and projects for work getting canceled, as federal grants are involved. Currently working to explore options to support his daughter as her teacher reported that she is easily distracted. Reports of purposeful weight loss, down from 242 lbs to 224 lbs. Started to workout and play basketball more. Will start meal prep service to help with diet and nutrition. States of having more energy and feeling better. Tolerating medications well with no reports of side effects. No safety concerns. Denies SI/HI.    FROM PREVIOUS HPI  Denny is seen today for medication follow up. Provides recent updates. States that he had forgotten to take sertraline for a few days and was attempting to "catch up" with his dosages. His wife was concerned due to some confusion he was having, so he went to the ER on 1/6/2025. ER note reviewed and reassuring. He reports all symptoms have improved at this time. Enjoyed his trip to [place] with his family. Has been applying for jobs in [place] and would like to relocate to [place]. Enjoyed his holiday. Feels his medications are supporting him and would like to continue as " prescribed. Plans to resume therapy shortly - [facility], private practice ([facility]). Denies SI/HI.    [facility] - with Barbra Acuna.   [phone] - call her to coordinate     Plan from neuro on chart review:   check on med interaction for fluxoxamine and plavix  repeat imaging  continue DAPT x 6 months  reassess after imaging   referral to  to look for connective tissue disorder   RTC september with dr. boateng.     He does note that he has had much more generalized anxiety and comparison to just obsessive-compulsive symptoms.          4/9/2025     8:02 AM 1/16/2025     7:55 AM 11/18/2024     7:51 AM   GAD7   1. Feeling nervous, anxious, or on edge? 1 1 1   2. Not being able to stop or control worrying? 1 1 1   3. Worrying too much about different things? 1 1 1   4. Trouble relaxing? 1 1 1   5. Being so restless that it is hard to sit still? 1 1 1   6. Becoming easily annoyed or irritable? 1 1 1   7. Feeling afraid as if something awful might happen? 0 0 0   8. If you checked off any problems, how difficult have these problems made it for you to do your work, take care of things at home, or get along with other people? 1 1 1   MAHESH-7 Score 6  6  6        Patient-reported         4/9/2025   PHQ-9 Depression Patient Health Questionnaire   Over the last two weeks how often have you been bothered by little interest or pleasure in doing things 0   Over the last two weeks how often have you been bothered by feeling down, depressed or hopeless 0   Over the last two weeks how often have you been bothered by trouble falling or staying asleep, or sleeping too much 0   Over the last two weeks how often have you been bothered by feeling tired or having little energy 1   Over the last two weeks how often have you been bothered by a poor appetite or overeating 1   Over the last two weeks how often have you been bothered by feeling bad about yourself - or that you are a failure or have let yourself or your family down 0   Over  the last two weeks how often have you been bothered by trouble concentrating on things, such as reading the newspaper or watching television 1   Over the last two weeks how often have you been bothered by moving or speaking so slowly that other people could have noticed. 0   Over the last two weeks how often have you been bothered by thoughts that you would be better off dead, or of hurting yourself 0   PHQ-9 Score 3        Patient-reported     Outpatient Psychiatry Initial Visit (MD/NP)     1/26/2022     Denny Atkinson, a 37 y.o. male, presenting for initial evaluation visit. Met with patient.     Reason for Encounter: Referral from Ben Dos Santos. Patient complains of OCD/anxiety.     History of Present Illness:   This is a 37-year-old male, past medical history of hyperlipidemia, who presents today for initial evaluation.  He reports that he is here due to significant amount of anxiety in particular obsessive-compulsive related symptoms.  He states he is obsessively worrying.  He is a  and manages many graduates students.  He reports this is a very high stress/high pressure job.  His wife is a professor as well.  He has a 2-year-old girl, this is their first child. He has been on escitalopram for many years.  He states that is been working pretty well but he still does endorse a significant amount of anxiety.  Has trouble with sleep.  He does state that he drinks too much coffee and also uses Nicorette gum is a stimulant.  He states he used to play basketball 3-4 nights per week prior to COVID and this helped him sleep much more.  He has been exercising some but it is not enough to help him with his sleep.  He states this was a major stress release.  He has struggled for a while with tasks at his job.  He endorses ruminating thoughts.  Will have detailed notes that he has to reread and add to them.  It is impacting his functioning at this time.  When he was younger, he used to do repetitive  handwashing but is not doing this now.  He does find that his symptoms are impacting his day-to-day life.  Medications that he has tried include bupropion, sertraline, fluoxetine, paroxetine.  He reports that he did have symptoms of serotonin toxicity during a cross taper of medications.  He states he is sensitive to medications.  Has not been on tricyclic antidepressants or Luvox.  Unsure about trazodone.  He previously participated in therapy for many years, interested in therapy referral today.  Discussed sleep hygiene in detail.  Denies suicidal or homicidal ideation.  No other complaints today.     Endorses prior problems with substance use disorders - does not want to be prescribed anything habit forming.     Depression symptoms:  Endorses difficulty with sleep, feeling tired or having little energy, overeating, trouble concentrating.  Denies suicidal ideation.  PHQ-9 seven, somewhat difficult     Anxiety symptoms:  GAD7 1/27/2022   1. Feeling nervous, anxious, or on edge? 1   2. Not being able to stop or control worrying? 3   3. Worrying too much about different things? 3   4. Trouble relaxing? 2   5. Being so restless that it is hard to sit still? 0   6. Becoming easily annoyed or irritable? 1   7. Feeling afraid as if something awful might happen? 0   8. If you checked off any problems, how difficult have these problems made it for you to do your work, take care of things at home, or get along with other people? 1   MAHESH-7 Score 10            Fina/Hypomania symptoms: denies  MDQ Scale 1/27/2022   you felt so good or so hyper that other people thought you were not your normal self or you were so hyper that you got into trouble? 0   you were so irritable that you shouted at people or started fights or arguments? 0   you felt much more self-confident than usual? 0   you got much less sleep than usual and found that you didn't really miss it? 0   you were more talkative or spoke much faster than usual? 0    thoughts raced through your head or you couldn't slow your mind down? 1   you were so easily distracted by things around you that you had trouble concentrating or staying on track? 1   you had more energy than usual? 0   you were much more active or did many more things than usual? 0   you were much more social or outgoing than usual, for example, you telephoned friends in the middle of the night? 0   you were much more interested in sex than usual? 0   you did things that were unusual for you or that other people might have thought were excessive, foolish, or risky? 0   spending money got you or your family in trouble? 0   If you checked YES to more than one of the above, have several of these ever happened during the same period of time? 1   How much of a problem did any of these cause you - like being unable to work; having family, money or legal troubles; getting into arguments or fights? Moderate problem   Mood Disorder Questionnaire Score  2      Psychosis: denies     Attention/Concentration: fair     Body Image/Hx of eating disorders: denies, eating too much occasionally      Suicidal ideation and risk: denies suicidal thoughts, no access to guns, no hx of suicidal thoughts or attempts     Homicidal/Violient ideation and risk: denies     Sleep: poor sleep hygiene     Appetite: overeating     Past Psychiatric History:  Prior diagnoses: OCD, MAHESH, never been diagnosed with ADHD      Inpatient psychiatric treatment: denies     Outpatient psychiatric treatment: last saw psychiatrist, saw someone before Dr. Cast. Four or so years ago. Wasn't really thrilled. Was happy just on lexapro.      Prior medications: as described above     Current medications: lexapro 20mg daily     Prior suicide attempts: denies     Prior history self harm: denies     Prior psychotherapy: has participated in the past     Prior psychological testing: none     Allergies:       Review of patient's allergies indicates:   Allergen Reactions     Center-al house dust        Past Medical History:       Past Medical History:   Diagnosis Date    Allergy      Anxiety     Elevated cholesterol - not taking medicine      History TBI: denies  History seizures: denies     Past Surgical History:        Past Surgical History:   Procedure Laterality Date    FINGER SURGERY   2007    HERNIA REPAIR   1985      Family History:   Suicide: denies  Substance use: great grandfather   Bipolar disorder/Psychotic disorder: denies  Anxiety: brother, mom and dad  Depression: denies     Social History:  Childhood: born in [place]. Grew up in [place]. Went to college in [employer]. Raised by biological mother and father. Good relationship. They live in [place], [place]. One brother in [place] - younger brother. He's doing well. He has some anxiety, definitely not OCD.   Marital status: has been  for almost 10 years, supportive wife   Children: 2 year old daughter, August   Resides: in [place], [place]  Occupation: professor - [employer]   Hobbies: basketball, enjoys walking/hiking with the family, riding bikes, likes to be outside  Presybeterian: not religlious   Education level: PhD  : denies  Legal: denies  History of abuse/trauma: denies     Substance History:  Tobacco: nicorette gum - used to smoke cigarettes, quit smoking cigarettes probably 12 years ago. Gum - 3 or 4 pieces of day - at night.   Alcohol: no alcohol, when he was in high school would drink a lot. Has addictive personality.   Drug use: high school - opioids, not stimulants, used benzos, had to go treatment.   Caffeine: high caffeine - one of those yeti of coffee - make at home, community coffee      Rehab:  Prior/current AA: inpatient detox/rehab 20 years ago - several times between [age], before college     Review of Systems   PSYCHIATRIC: Pertinant items are noted in the narrative.  RESPIRATORY: No shortness of breath.  CARDIOVASCULAR: No tachycardia or chest pain.  GASTROINTESTINAL: No nausea, vomiting, pain, constipation or  diarrhea.    Past Medical, Family and Social History: The patient's past medical, family and social history have been reviewed and updated as appropriate within the electronic medical record - see encounter notes.      Risk Parameters:  Patient reports no suicidal ideation  Patient reports no homicidal ideation  Patient reports no self-injurious behavior  Patient reports no violent behavior    Exam (detailed: at least 9 elements; comprehensive: all 15 elements)   Constitutional  Vitals:  Most recent vital signs, dated less than 90 days prior to this appointment, were reviewed.     Vitals:    04/09/25 0808   BP: 127/88   Pulse: 62          General:  unremarkable, age appropriate     Musculoskeletal  Muscle Strength/Tone:  no dyskinesia   Gait & Station:  non-ataxic     Psychiatric  Speech:  no latency; no press   Mood & Affect:  appropriate   Thought Process:  normal and logical   Associations:  intact   Thought Content:  normal, no suicidality, no homicidality, delusions, or paranoia   Insight:  intact   Judgement: behavior is adequate to circumstances   Orientation:  grossly intact   Memory: intact for content of interview   Language: grossly intact   Attention Span & Concentration:  able to focus   Fund of Knowledge:  intact and appropriate to age and level of education     Assessment and Diagnosis   Status/Progress: Based on the examination today, the patient's problem(s) is/are well controlled.  New problems have not been presented today.   Co-morbidities, Diagnostic uncertainty, and Lack of compliance are not complicating management of the primary condition.      General Impression:   OCD with good insight  MAHESH     Intervention/Counseling/Treatment Plan   Medication Management: The risks and benefits of medication were discussed with the patient.  Counseling provided with patient as follows: importance of compliance with chosen treatment options was emphasized, risks and benefits of treatment options, including  medications, were discussed with the patient, risk factor reduction, prognosis    Denny is seen today for medication follow-up. Based on assessment:    Continue aripiprazole 2 mg once daily for augmentation of OCD symptoms.  Medication risks, side effects, benefits explained in detail. Discussed risks of tardive dyskinesia, drug induced parkinsonism, metabolic side effects, including weight gain, neuroleptic malignant syndrome. Lipid panel ordered.  Continue sertraline 100 mg daily for OCD symptoms.  Trial headspace for guided meditation.   Continue with [facility]bra Acuna for im[facility].  Provided information on [facility] in [place].  Consider naltrexone.    Please go to emergency department if feeling as though you are a harm to yourself or others or if you are in crisis. Please call the clinic to report any worsening of symptoms or problems associated with medication.    Discussed with patient informed consent, risks vs. benefits, alternative treatments, side effect profile and the inherent unpredictability of individual responses to these treatments. The patient expresses understanding of the above and displays the capacity to agree with this current plan and had no other questions.    Discussed risk of serotonin syndrome with these medications. Symptoms of concern include agitation/restlessness, confusion, rapid heart rate/high blood pressure, dilated pupils, loss of muscle coordination, muscle rigidity, heavy sweating.    Visit today included increased complexity associated with managing the longitudinal care of the patient due to the serious and/or complex managed problem(s) anxiety/ocd.      Return to Clinic: as scheduled, as needed

## 2025-04-28 ENCOUNTER — OFFICE VISIT (OUTPATIENT)
Dept: PODIATRY | Facility: CLINIC | Age: 40
End: 2025-04-28
Payer: COMMERCIAL

## 2025-04-28 VITALS
DIASTOLIC BLOOD PRESSURE: 74 MMHG | HEIGHT: 74 IN | SYSTOLIC BLOOD PRESSURE: 129 MMHG | WEIGHT: 219.88 LBS | BODY MASS INDEX: 28.22 KG/M2 | RESPIRATION RATE: 18 BRPM | HEART RATE: 72 BPM

## 2025-04-28 DIAGNOSIS — L60.1 ONYCHOLYSIS OF TOENAIL: ICD-10-CM

## 2025-04-28 DIAGNOSIS — S99.922S INJURY OF TOENAIL OF LEFT FOOT, SEQUELA: Primary | ICD-10-CM

## 2025-04-28 PROCEDURE — 99202 OFFICE O/P NEW SF 15 MIN: CPT | Mod: S$GLB,,, | Performed by: PODIATRIST

## 2025-04-28 PROCEDURE — 3008F BODY MASS INDEX DOCD: CPT | Mod: S$GLB,,, | Performed by: PODIATRIST

## 2025-04-28 PROCEDURE — 3078F DIAST BP <80 MM HG: CPT | Mod: S$GLB,,, | Performed by: PODIATRIST

## 2025-04-28 PROCEDURE — 3074F SYST BP LT 130 MM HG: CPT | Mod: S$GLB,,, | Performed by: PODIATRIST

## 2025-04-28 PROCEDURE — 99999 PR PBB SHADOW E&M-EST. PATIENT-LVL IV: CPT | Mod: PBBFAC,,, | Performed by: PODIATRIST

## 2025-04-28 PROCEDURE — 1159F MED LIST DOCD IN RCRD: CPT | Mod: S$GLB,,, | Performed by: PODIATRIST

## 2025-04-28 RX ORDER — AMOXICILLIN 500 MG/1
500 CAPSULE ORAL 2 TIMES DAILY
COMMUNITY
Start: 2024-12-10 | End: 2025-04-28

## 2025-04-28 NOTE — PROGRESS NOTES
Subjective:       Patient ID: Denny Atkinson is a 40 y.o. male.    Chief Complaint: Nail Problem (Left Great Toenail), Toe Injury (Left Great Toenail), and Toe Pain (Left Great Toenail)  Patient presents with complaint of nail problem left great toe which started after an 2 injuries to the nail in the same basketball game about 4 months ago.  Nail was painful for a while, then started to lift up and now is very loose except for the side next to the 2nd digit    Past Medical History:   Diagnosis Date    Allergy     Anxiety      Past Surgical History:   Procedure Laterality Date    FINGER SURGERY  2007    HERNIA REPAIR  1985     Family History   Problem Relation Name Age of Onset    Hypertension Father      Hyperlipidemia Father      Hypertension Brother      Hyperlipidemia Brother      Hypertension Paternal Grandmother      Cancer Paternal Grandfather      Hypertension Paternal Grandfather      Hyperlipidemia Paternal Grandfather       Social History     Socioeconomic History    Marital status:     Number of children: 1   Tobacco Use    Smoking status: Former     Current packs/day: 0.00     Types: Cigarettes     Quit date:      Years since quittin.3     Passive exposure: Past    Smokeless tobacco: Never   Substance and Sexual Activity    Alcohol use: No    Drug use: No    Sexual activity: Yes     Social Drivers of Health     Financial Resource Strain: Low Risk  (2023)    Overall Financial Resource Strain (CARDIA)     Difficulty of Paying Living Expenses: Not hard at all   Food Insecurity: Patient Declined (3/29/2024)    Received from [facility]    Hunger Vital Sign     Worried About Running Out of Food in the Last Year: Patient declined     Ran Out of Food in the Last Year: Patient declined   Transportation Needs: Patient Declined (3/29/2024)    Received from [facility]    PRAPARE - Transportation     Lack of Transportation (Medical): Patient declined     Lack of Transportation (Non-Medical):  "Patient declined   Physical Activity: Unknown (8/21/2023)    Exercise Vital Sign     Days of Exercise per Week: 2 days   Stress: Stress Concern Present (8/21/2023)    [place] [employer] - Occupational Stress Questionnaire     Feeling of Stress : To some extent   Housing Stability: Patient Declined (3/29/2024)    Received from [facility]    Housing Stability Vital Sign     Unable to Pay for Housing in the Last Year: Patient declined     Number of Places Lived in the Last Year: 1     Unstable Housing in the Last Year: Patient declined       Current Medications[1]  Review of patient's allergies indicates:   Allergen Reactions    Center-al house dust        Review of Systems   All other systems reviewed and are negative.      Objective:      Vitals:    04/28/25 0921   BP: 129/74   Pulse: 72   Resp: 18   Weight: 99.7 kg (219 lb 14.4 oz)   Height: 6' 2" (1.88 m)     Physical Exam  Vitals and nursing note reviewed.   Constitutional:       General: He is not in acute distress.  Cardiovascular:      Pulses:           Dorsalis pedis pulses are 2+ on the right side and 2+ on the left side.        Posterior tibial pulses are 2+ on the right side and 2+ on the left side.   Musculoskeletal:      Right foot: No deformity.      Left foot: No deformity.   Feet:      Right foot:      Skin integrity: Skin integrity normal.      Left foot:      Skin integrity: No erythema (Onycholysis majority of the medial aspect of the nail plate revealing healing sore/scab, most likely area of impact and as small portion of healthy nail at the cuticle without edema erythema or calor).      Toenail Condition: Left toenails are abnormally thick.   Skin:     Capillary Refill: Capillary refill takes less than 2 seconds.      Findings: Bruising present. No erythema.      Comments: Old injury left hallux nail plate   Neurological:      General: No focal deficit present.                    Assessment:       1. Injury of toenail of " left foot, sequela    2. Onycholysis of toenail        Plan:           Debrided all detached nail which left a small portion along the lateral skin fold.  The remaining portion of nail is healthy clear and flat  This revealed approximally 20-25% of new nail at the cuticle which is pink, clear healthy but there is also a lot of damage to the nail bed which is irregular with small sore with a scab still healing on the medial portion of the nail bed  Instructed patient to keep area clean and dry  Reviewed care and maintenance of the remaining portion of the original nail as well as the new nail at the base to keep them smooth and flat using nail file or emery board to keep them smooth  Discussed applying a thin film of Vicks vapor rub once daily to help hydrate the nail bed, this can help facilitate new nail to reattached, provides moisture and hydration to the nail and can help with any fungus which is common to occur and nails with injury  Any pain redness or swelling soak warm water and Epson salt  Showed patient how to use Coban to prevent repetitive pressure on the area plain basketball in the future  Contact office with any changes  Patient was in understanding and agreement with treatment plan.  I counseled the patient on their conditions, implications and medical management.  Instructed patient to contact the office with any changes, questions, concerns, worsening of symptoms.   Total face to face time 20 minutes, exam, assessment, treatment, discussion, additional time for review of chart prior to and following appointment and visit documentation, consultation and coordination of care.   Follow up as needed    This note was created using M*Modal voice recognition software that occasionally misinterpreted phrases or words.         [1]   Current Outpatient Medications   Medication Sig Dispense Refill    ARIPiprazole (ABILIFY) 2 MG Tab Take 1 tablet (2 mg total) by mouth once daily. 90 tablet 1    aspirin 81 MG  Chew Take 81 mg by mouth once daily.      cetirizine (ZYRTEC) 10 MG tablet Take 10 mg by mouth daily as needed for Allergies or Rhinitis.      fluticasone (FLONASE) 50 mcg/actuation nasal spray 1 spray by Each Nare route once daily.      meclizine (ANTIVERT) 12.5 mg tablet Take 1 tablet (12.5 mg total) by mouth 3 (three) times daily as needed for Dizziness or Nausea. 30 tablet 0    multivitamin (THERAGRAN) per tablet Take 1 tablet by mouth once daily.      omega-3 fatty acids/fish oil (FISH OIL-OMEGA-3 FATTY ACIDS) 300-1,000 mg capsule Take 1 capsule by mouth 2 (two) times a day.      rosuvastatin (CRESTOR) 10 MG tablet TAKE 1 TABLET BY MOUTH EVERY DAY 90 tablet 1    sertraline (ZOLOFT) 100 MG tablet Take 1 tablet (100 mg total) by mouth once daily. 90 tablet 1    amoxicillin (AMOXIL) 500 MG capsule Take 500 mg by mouth 2 (two) times daily. (Patient not taking: Reported on 4/28/2025)      EPINEPHrine (EPIPEN) 0.3 mg/0.3 mL AtIn Inject 0.3 mLs (0.3 mg total) into the muscle once. for 1 dose 0.3 mL 1     No current facility-administered medications for this visit.

## 2025-05-09 ENCOUNTER — PATIENT MESSAGE (OUTPATIENT)
Dept: PSYCHIATRY | Facility: CLINIC | Age: 40
End: 2025-05-09
Payer: COMMERCIAL

## 2025-05-09 ENCOUNTER — TELEPHONE (OUTPATIENT)
Dept: PSYCHIATRY | Facility: CLINIC | Age: 40
End: 2025-05-09
Payer: COMMERCIAL

## 2025-05-09 NOTE — TELEPHONE ENCOUNTER
Called pt and left message to call office need to get lab scheduled. Also sent my chart message of the same

## 2025-05-09 NOTE — TELEPHONE ENCOUNTER
----- Message from Nurse Wiley sent at 5/9/2025  8:57 AM CDT -----  Overdue lab  ----- Message -----  From: SYSTEM  Sent: 5/9/2025   1:25 AM CDT  To: [facility]

## 2025-06-18 ENCOUNTER — LAB VISIT (OUTPATIENT)
Dept: LAB | Facility: HOSPITAL | Age: 40
End: 2025-06-18
Attending: STUDENT IN AN ORGANIZED HEALTH CARE EDUCATION/TRAINING PROGRAM
Payer: COMMERCIAL

## 2025-06-18 ENCOUNTER — OFFICE VISIT (OUTPATIENT)
Dept: FAMILY MEDICINE | Facility: CLINIC | Age: 40
End: 2025-06-18
Payer: COMMERCIAL

## 2025-06-18 ENCOUNTER — RESULTS FOLLOW-UP (OUTPATIENT)
Dept: FAMILY MEDICINE | Facility: CLINIC | Age: 40
End: 2025-06-18

## 2025-06-18 VITALS
RESPIRATION RATE: 16 BRPM | HEART RATE: 57 BPM | TEMPERATURE: 98 F | BODY MASS INDEX: 25.23 KG/M2 | HEIGHT: 74 IN | SYSTOLIC BLOOD PRESSURE: 100 MMHG | WEIGHT: 196.63 LBS | DIASTOLIC BLOOD PRESSURE: 70 MMHG | OXYGEN SATURATION: 98 %

## 2025-06-18 DIAGNOSIS — D64.9 ANEMIA, UNSPECIFIED TYPE: ICD-10-CM

## 2025-06-18 DIAGNOSIS — R79.9 ABNORMAL FINDING OF BLOOD CHEMISTRY, UNSPECIFIED: ICD-10-CM

## 2025-06-18 DIAGNOSIS — R94.5 ABNORMAL RESULTS OF LIVER FUNCTION STUDIES: ICD-10-CM

## 2025-06-18 DIAGNOSIS — Z00.00 HEALTHCARE MAINTENANCE: ICD-10-CM

## 2025-06-18 DIAGNOSIS — Z00.00 ENCOUNTER FOR PREVENTIVE HEALTH EXAMINATION: Primary | ICD-10-CM

## 2025-06-18 DIAGNOSIS — Z00.00 ENCOUNTER FOR PREVENTIVE HEALTH EXAMINATION: ICD-10-CM

## 2025-06-18 DIAGNOSIS — F41.1 GAD (GENERALIZED ANXIETY DISORDER): ICD-10-CM

## 2025-06-18 LAB
ABSOLUTE EOSINOPHIL (OHS): 0.1 K/UL
ABSOLUTE MONOCYTE (OHS): 0.33 K/UL (ref 0.3–1)
ABSOLUTE NEUTROPHIL COUNT (OHS): 1.89 K/UL (ref 1.8–7.7)
ALBUMIN SERPL BCP-MCNC: 5 G/DL (ref 3.5–5.2)
ALP SERPL-CCNC: 81 UNIT/L (ref 40–150)
ALT SERPL W/O P-5'-P-CCNC: 37 UNIT/L (ref 10–44)
ANION GAP (OHS): 11 MMOL/L (ref 8–16)
AST SERPL-CCNC: 29 UNIT/L (ref 11–45)
BASOPHILS # BLD AUTO: 0.01 K/UL
BASOPHILS NFR BLD AUTO: 0.2 %
BILIRUB SERPL-MCNC: 0.7 MG/DL (ref 0.1–1)
BUN SERPL-MCNC: 9 MG/DL (ref 6–20)
CALCIUM SERPL-MCNC: 9.7 MG/DL (ref 8.7–10.5)
CHLORIDE SERPL-SCNC: 108 MMOL/L (ref 95–110)
CHOLEST SERPL-MCNC: 152 MG/DL (ref 120–199)
CHOLEST/HDLC SERPL: 3.6 {RATIO} (ref 2–5)
CO2 SERPL-SCNC: 23 MMOL/L (ref 23–29)
CREAT SERPL-MCNC: 0.8 MG/DL (ref 0.5–1.4)
EAG (OHS): 111 MG/DL (ref 68–131)
ERYTHROCYTE [DISTWIDTH] IN BLOOD BY AUTOMATED COUNT: 13.4 % (ref 11.5–14.5)
FERRITIN SERPL-MCNC: 279 NG/ML (ref 20–300)
GFR SERPLBLD CREATININE-BSD FMLA CKD-EPI: >60 ML/MIN/1.73/M2
GLUCOSE SERPL-MCNC: 108 MG/DL (ref 70–110)
HAV IGM SERPL QL IA: NORMAL
HBA1C MFR BLD: 5.5 % (ref 4–5.6)
HBV CORE IGM SERPL QL IA: NORMAL
HBV SURFACE AG SERPL QL IA: NORMAL
HCT VFR BLD AUTO: 41.7 % (ref 40–54)
HCV AB SERPL QL IA: NORMAL
HDLC SERPL-MCNC: 42 MG/DL (ref 40–75)
HDLC SERPL: 27.6 % (ref 20–50)
HGB BLD-MCNC: 13.7 GM/DL (ref 14–18)
IMM GRANULOCYTES # BLD AUTO: 0.01 K/UL (ref 0–0.04)
IMM GRANULOCYTES NFR BLD AUTO: 0.2 % (ref 0–0.5)
IRON SATN MFR SERPL: 21 % (ref 20–50)
IRON SERPL-MCNC: 77 UG/DL (ref 45–160)
LDLC SERPL CALC-MCNC: 93.6 MG/DL (ref 63–159)
LYMPHOCYTES # BLD AUTO: 1.67 K/UL (ref 1–4.8)
MCH RBC QN AUTO: 30.8 PG (ref 27–31)
MCHC RBC AUTO-ENTMCNC: 32.9 G/DL (ref 32–36)
MCV RBC AUTO: 94 FL (ref 82–98)
NONHDLC SERPL-MCNC: 110 MG/DL
NUCLEATED RBC (/100WBC) (OHS): 0 /100 WBC
PLATELET # BLD AUTO: 227 K/UL (ref 150–450)
PMV BLD AUTO: 10.8 FL (ref 9.2–12.9)
POTASSIUM SERPL-SCNC: 4.6 MMOL/L (ref 3.5–5.1)
PROT SERPL-MCNC: 7.4 GM/DL (ref 6–8.4)
RBC # BLD AUTO: 4.45 M/UL (ref 4.6–6.2)
RELATIVE EOSINOPHIL (OHS): 2.5 %
RELATIVE LYMPHOCYTE (OHS): 41.6 % (ref 18–48)
RELATIVE MONOCYTE (OHS): 8.2 % (ref 4–15)
RELATIVE NEUTROPHIL (OHS): 47.3 % (ref 38–73)
SODIUM SERPL-SCNC: 142 MMOL/L (ref 136–145)
TIBC SERPL-MCNC: 373 UG/DL (ref 250–450)
TRANSFERRIN SERPL-MCNC: 252 MG/DL (ref 200–375)
TRIGL SERPL-MCNC: 82 MG/DL (ref 30–150)
TSH SERPL-ACNC: 1.25 UIU/ML (ref 0.4–4)
WBC # BLD AUTO: 4.01 K/UL (ref 3.9–12.7)

## 2025-06-18 PROCEDURE — 3008F BODY MASS INDEX DOCD: CPT | Mod: CPTII,S$GLB,, | Performed by: STUDENT IN AN ORGANIZED HEALTH CARE EDUCATION/TRAINING PROGRAM

## 2025-06-18 PROCEDURE — 80053 COMPREHEN METABOLIC PANEL: CPT

## 2025-06-18 PROCEDURE — 80074 ACUTE HEPATITIS PANEL: CPT

## 2025-06-18 PROCEDURE — 82607 VITAMIN B-12: CPT

## 2025-06-18 PROCEDURE — 99999 PR PBB SHADOW E&M-EST. PATIENT-LVL IV: CPT | Mod: PBBFAC,,, | Performed by: STUDENT IN AN ORGANIZED HEALTH CARE EDUCATION/TRAINING PROGRAM

## 2025-06-18 PROCEDURE — 82728 ASSAY OF FERRITIN: CPT

## 2025-06-18 PROCEDURE — 36415 COLL VENOUS BLD VENIPUNCTURE: CPT | Mod: PO

## 2025-06-18 PROCEDURE — 1159F MED LIST DOCD IN RCRD: CPT | Mod: CPTII,S$GLB,, | Performed by: STUDENT IN AN ORGANIZED HEALTH CARE EDUCATION/TRAINING PROGRAM

## 2025-06-18 PROCEDURE — 85025 COMPLETE CBC W/AUTO DIFF WBC: CPT

## 2025-06-18 PROCEDURE — 84443 ASSAY THYROID STIM HORMONE: CPT

## 2025-06-18 PROCEDURE — 83540 ASSAY OF IRON: CPT

## 2025-06-18 PROCEDURE — 99396 PREV VISIT EST AGE 40-64: CPT | Mod: S$GLB,,, | Performed by: STUDENT IN AN ORGANIZED HEALTH CARE EDUCATION/TRAINING PROGRAM

## 2025-06-18 PROCEDURE — 3078F DIAST BP <80 MM HG: CPT | Mod: CPTII,S$GLB,, | Performed by: STUDENT IN AN ORGANIZED HEALTH CARE EDUCATION/TRAINING PROGRAM

## 2025-06-18 PROCEDURE — 83036 HEMOGLOBIN GLYCOSYLATED A1C: CPT

## 2025-06-18 PROCEDURE — 3074F SYST BP LT 130 MM HG: CPT | Mod: CPTII,S$GLB,, | Performed by: STUDENT IN AN ORGANIZED HEALTH CARE EDUCATION/TRAINING PROGRAM

## 2025-06-18 PROCEDURE — 80061 LIPID PANEL: CPT

## 2025-06-18 PROCEDURE — 83921 ORGANIC ACID SINGLE QUANT: CPT

## 2025-06-18 NOTE — PROGRESS NOTES
[facility] Note    Subjective:    The HPI and pertinent ROS is included in the Diagnostic Impression Remarks section at the end of the note. Please see below for further details. Chief complaint is at end of note.     Denny is a pleasant intelligent patient who is here for evaluation.     Modified Medications    No medications on file       Data reviewed    274}  Previous medical records reviewed and summarized in plan section at end of note.      If you are due for any health screening(s) below please notify me so we can arrange them to be ordered and scheduled. Most healthy patients at your age complete them, but you are free to accept or refuse. If you can't do it, I'll definitely understand. If you can, I'd certainly appreciate it!     All of your core healthy metrics are met.      The following portions of the patient's history were reviewed and updated as appropriate: allergies, current medications, past family history, past medical history, past social history, past surgical history and problem list.    He  has a past medical history of Allergy and Anxiety.  He  has a past surgical history that includes Finger surgery (2007) and Hernia repair (1985).    He  reports that he quit smoking about 21 years ago. His smoking use included cigarettes. He has been exposed to tobacco smoke. He has never used smokeless tobacco. He reports that he does not drink alcohol and does not use drugs.  He family history includes Cancer in his paternal grandfather; Hyperlipidemia in his brother, father, and paternal grandfather; Hypertension in his brother, father, paternal grandfather, and paternal grandmother.    Review of patient's allergies indicates:   Allergen Reactions    [facility] house dust        Tobacco Use: Medium Risk (6/18/2025)    Patient History     Smoking Tobacco Use: Former     Smokeless Tobacco Use: Never     Passive Exposure: Past     Physical Examination  Physical Exam    General: No acute  "distress. Well-developed. Well-nourished.  Eyes: EOMI. Sclerae anicteric.  HENT: Normocephalic. Atraumatic. Nares patent. Moist oral mucosa.  Cardiovascular: Regular rate. Regular rhythm. No murmurs. No rubs. No gallops. Normal S1, S2.  Respiratory: Normal respiratory effort. Clear to auscultation bilaterally. No rales. No rhonchi. No wheezing.  Musculoskeletal: No  obvious deformity.  Extremities: No lower extremity edema.  Neurological: Alert & oriented x3. No slurred speech. Normal gait.  Psychiatric: Normal mood. Normal affect. Good insight. Good judgment.  Skin: Warm. Dry. No rash.              BP Readings from Last 3 Encounters:   06/18/25 100/70   04/28/25 129/74   04/09/25 127/88     Wt Readings from Last 3 Encounters:   06/18/25 89.2 kg (196 lb 10.4 oz)   04/28/25 99.7 kg (219 lb 14.4 oz)   04/09/25 102 kg (224 lb 15.7 oz)     /70 (BP Location: Right arm, Patient Position: Sitting)   Pulse (!) 57   Temp 97.8 °F (36.6 °C) (Oral)   Resp 16   Ht 6' 2" (1.88 m)   Wt 89.2 kg (196 lb 10.4 oz)   SpO2 98%   BMI 25.25 kg/m²       274}  Laboratory: I have reviewed old labs below:       274}    Lab Results   Component Value Date    WBC 5.95 01/06/2025    HGB 12.7 (L) 01/06/2025    HCT 38.0 (L) 01/06/2025    MCV 92 01/06/2025     01/06/2025     01/06/2025    K 3.9 01/06/2025     01/06/2025    CALCIUM 9.1 01/06/2025    CO2 27 01/06/2025    GLUCOSE 112 (H) 03/30/2024    BUN 14 01/06/2025    CREATININE 0.8 01/06/2025    EGFRNORACEVR >60.0 01/06/2025    LABPROT 13.3 03/29/2024    ALBUMIN 4.4 01/06/2025    BILITOT 0.6 01/06/2025    ALKPHOS 72 01/06/2025    ALT 47 (H) 01/06/2025    AST 25 01/06/2025    INR 1.0 03/29/2024    CHOL 148 03/29/2024    TRIG 113 03/29/2024    HDL 39 (L) 03/29/2024    LDLCALC 86 03/29/2024    TSH 0.795 01/06/2025    GLUF 92 02/18/2019    HGBA1C 5.6 06/17/2024      Lab reviewed by me: Particular labs of significance that I will monitor, workup, or treat to improve are " mentioned below in diagnostic impression remarks.    The 10-year ASCVD risk score (Sandy ANTONIO, et al., 2019) is: 0.6%    Values used to calculate the score:      Age: 40 years      Sex: Male      Is Non- : No      Diabetic: No      Tobacco smoker: No      Systolic Blood Pressure: 100 mmHg      Is BP treated: No      HDL Cholesterol: 39 mg/dL      Total Cholesterol: 148 mg/dL      Imaging/EKG: I have reviewed the pertinent results and my findings are noted in remarks.     274}    CC:   Chief Complaint   Patient presents with    Annual Exam    Hyperlipidemia           274}    History of Present Illness    CHIEF COMPLAINT:  Patient presents today for follow up    DIET AND WEIGHT MANAGEMENT:  He reports successful weight loss through portion control and adherence to diet modifications over the past few months.    MEDICAL HISTORY:  He had an emergency room visit in January due to a medication side effect. He reports compliance with his current medications.    ALLERGIES:  He denies any allergies.      ROS:  Constitutional: +weight loss          Assessment/Plan  Denny Atkinson is a 40 y.o. male who presents to clinic with:  1. Encounter for preventive health examination    2. Healthcare maintenance    3. Anemia, unspecified type    4. MAHESH (generalized anxiety disorder)    5. Abnormal finding of blood chemistry, unspecified    6. Abnormal results of liver function studies          274}    Assessment & Plan    Z00.00 Healthcare maintenance  D64.9 Anemia, unspecified type  F41.1 MAHESH (generalized anxiety disorder)  R79.9 Abnormal finding of blood chemistry, unspecified  R94.5 Abnormal results of liver function studies    PLAN SUMMARY:  - Order iron and B12 levels  - Continue current medications for HLD management  - Continue current medications for generalized anxiety disorder  - Recheck liver enzymes    HEALTHCARE MAINTENANCE:  - Overall condition stable with good compliance to medications and recent  weight loss through dietary changes.  - Recent ER visit in January for medication side effect, but otherwise doing well.  - Low cardiac risk with HLD under control.  - Will continue current medications for HLD management.    ANEMIA, UNSPECIFIED TYPE:  - Anemia identified.  - Ordered iron levels and B12 levels.    MAHESH (GENERALIZED ANXIETY DISORDER):  - Generalized anxiety disorder stable.  - Will continue current medications for management.    ABNORMAL RESULTS OF LIVER FUNCTION STUDIES:  - Elevated liver enzymes noted.  - Plan to recheck and monitor liver enzymes.             This note was generated with the assistance of ambient listening technology. Verbal consent was obtained by the patient and accompanying visitor(s) for the recording of patient appointment to facilitate this note. I attest to having reviewed and edited the generated note for accuracy, though some syntax or spelling errors may persist. Please contact the author of this note for any clarification.       1. Healthcare maintenance    2. Anemia, unspecified type  - Vitamin B12 Deficiency Panel; Future  - Iron and TIBC; Future  - Ferritin; Future    3. MAHESH (generalized anxiety disorder)    4. Abnormal finding of blood chemistry, unspecified  - CBC Auto Differential; Future  - Comprehensive Metabolic Panel; Future  - Hemoglobin A1C; Future  - Hepatitis Panel, Acute; Future    5. Encounter for preventive health examination  - CBC Auto Differential; Future  - Comprehensive Metabolic Panel; Future  - Hemoglobin A1C; Future  - TSH; Future  - Lipid Panel; Future  - Vitamin B12 Deficiency Panel; Future  - Iron and TIBC; Future  - Ferritin; Future    6. Abnormal results of liver function studies  - Hepatitis Panel, Acute; Future      Arnie Woods MD      274}    If you are due for any health screening(s) below please notify me so we can arrange them to be ordered and scheduled. Most healthy patients at your age complete them, but you are free to accept or  refuse.     If you can't do it, I'll definitely understand. If you can, I'd certainly appreciate it!   All of your core healthy metrics are met.

## 2025-06-21 LAB — VIT B12 SERPL-MCNC: 231 NG/L (ref 180–914)

## 2025-06-24 LAB — METHYLMALONATE SERPL-SCNC: 0.12 NMOL/ML

## 2025-07-09 ENCOUNTER — OFFICE VISIT (OUTPATIENT)
Dept: PSYCHIATRY | Facility: CLINIC | Age: 40
End: 2025-07-09
Payer: COMMERCIAL

## 2025-07-09 VITALS
HEART RATE: 55 BPM | HEIGHT: 74 IN | BODY MASS INDEX: 25.9 KG/M2 | DIASTOLIC BLOOD PRESSURE: 73 MMHG | WEIGHT: 201.81 LBS | SYSTOLIC BLOOD PRESSURE: 111 MMHG

## 2025-07-09 DIAGNOSIS — F41.1 GAD (GENERALIZED ANXIETY DISORDER): ICD-10-CM

## 2025-07-09 DIAGNOSIS — F42.9 OBSESSIVE-COMPULSIVE DISORDER, UNSPECIFIED TYPE: Primary | ICD-10-CM

## 2025-07-09 PROCEDURE — 99214 OFFICE O/P EST MOD 30 MIN: CPT | Mod: S$GLB,,, | Performed by: PHYSICIAN ASSISTANT

## 2025-07-09 PROCEDURE — 3074F SYST BP LT 130 MM HG: CPT | Mod: CPTII,S$GLB,, | Performed by: PHYSICIAN ASSISTANT

## 2025-07-09 PROCEDURE — 1159F MED LIST DOCD IN RCRD: CPT | Mod: CPTII,S$GLB,, | Performed by: PHYSICIAN ASSISTANT

## 2025-07-09 PROCEDURE — G2211 COMPLEX E/M VISIT ADD ON: HCPCS | Mod: S$GLB,,, | Performed by: PHYSICIAN ASSISTANT

## 2025-07-09 PROCEDURE — 1160F RVW MEDS BY RX/DR IN RCRD: CPT | Mod: CPTII,S$GLB,, | Performed by: PHYSICIAN ASSISTANT

## 2025-07-09 PROCEDURE — 3078F DIAST BP <80 MM HG: CPT | Mod: CPTII,S$GLB,, | Performed by: PHYSICIAN ASSISTANT

## 2025-07-09 PROCEDURE — 3044F HG A1C LEVEL LT 7.0%: CPT | Mod: CPTII,S$GLB,, | Performed by: PHYSICIAN ASSISTANT

## 2025-07-09 PROCEDURE — 3008F BODY MASS INDEX DOCD: CPT | Mod: CPTII,S$GLB,, | Performed by: PHYSICIAN ASSISTANT

## 2025-07-09 PROCEDURE — 99999 PR PBB SHADOW E&M-EST. PATIENT-LVL III: CPT | Mod: PBBFAC,,, | Performed by: PHYSICIAN ASSISTANT

## 2025-07-09 NOTE — PROGRESS NOTES
Outpatient Psychiatry Follow-Up Visit (MD/NP)    7/9/2025    Clinical Status of Patient:  Outpatient (Ambulatory)    Chief Complaint:  Denny Atkinson is a 40 y.o. male who presents today for follow-up of anxiety.  Met with patient. Pt seen with KENYETTA Pena student, who assisted with documentation.    Interval History and Content of Current Session:  Interim Events/Subjective Report/Content of Current Session:  Denny is seen today for medication follow up. Reports that he is doing well. Traveling a lot right now for work. Planning to move either to [facility] home in [place] or to [place]. 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 On a new intermittent fasting diet using [employer] meal delivery service that is helping with weight loss, weight is stabilized at goal now. Begun playing basketball x4/wk. States having more energy and feeling better. Denies safety concerns.  No problems with medication regimen, continue as planned. Denies any side effects.     AIMS = 0    FROM PREVIOUS HPI  Denny is seen today for medication follow up. Reports that he is doing well. States that his family will remain in [place] after recent job search. He received a phone call from the [employer] [employer] letting him know that he was second in line for the job, feels content with not getting the position. States applying for jobs as been a humbling experience as Denny and his wife were both applying. Worried about future trips and projects for work getting canceled, as federal grants are involved. Currently working to explore options to support his daughter as her teacher reported that she is easily distracted. Reports of purposeful weight loss, down from 242 lbs to 224 lbs. Started to workout and play basketball more. Will start meal prep service to help with diet and nutrition. States of having more energy and feeling better. Tolerating medications well with no reports of side effects. No safety  concerns. Denies SI/HI.    IMTT - with Barbra Acuna.   [phone] - call her to coordinate     Plan from neuro on chart review:   check on med interaction for fluxoxamine and plavix  repeat imaging  continue DAPT x 6 months  reassess after imaging   referral to  to look for connective tissue disorder   RTC september with dr. boateng.     He does note that he has had much more generalized anxiety and comparison to just obsessive-compulsive symptoms.          7/9/2025     7:57 AM 4/9/2025     8:02 AM 1/16/2025     7:55 AM   GAD7   1. Feeling nervous, anxious, or on edge? 1 1 1   2. Not being able to stop or control worrying? 1 1 1   3. Worrying too much about different things? 1 1 1   4. Trouble relaxing? 1 1 1   5. Being so restless that it is hard to sit still? 1 1 1   6. Becoming easily annoyed or irritable? 1 1 1   7. Feeling afraid as if something awful might happen? 0 0 0   8. If you checked off any problems, how difficult have these problems made it for you to do your work, take care of things at home, or get along with other people? 1 1 1   MAHESH-7 Score 6  6  6        Patient-reported         7/9/2025   PHQ-9 Depression Patient Health Questionnaire   Over the last two weeks how often have you been bothered by little interest or pleasure in doing things 1   Over the last two weeks how often have you been bothered by feeling down, depressed or hopeless 0   Over the last two weeks how often have you been bothered by trouble falling or staying asleep, or sleeping too much 0   Over the last two weeks how often have you been bothered by feeling tired or having little energy 0   Over the last two weeks how often have you been bothered by a poor appetite or overeating 0   Over the last two weeks how often have you been bothered by feeling bad about yourself - or that you are a failure or have let yourself or your family down 0   Over the last two weeks how often have you been bothered by trouble  concentrating on things, such as reading the newspaper or watching television 1   Over the last two weeks how often have you been bothered by moving or speaking so slowly that other people could have noticed. 0   Over the last two weeks how often have you been bothered by thoughts that you would be better off dead, or of hurting yourself 0   PHQ-9 Score 2        Patient-reported     Outpatient Psychiatry Initial Visit (MD/NP)     1/26/2022     Denny Atkinson, a 37 y.o. male, presenting for initial evaluation visit. Met with patient.     Reason for Encounter: Referral from Ben Dos Santos. Patient complains of OCD/anxiety.     History of Present Illness:   This is a 37-year-old male, past medical history of hyperlipidemia, who presents today for initial evaluation.  He reports that he is here due to significant amount of anxiety in particular obsessive-compulsive related symptoms.  He states he is obsessively worrying.  He is a  and manages many graduates students.  He reports this is a very high stress/high pressure job.  His wife is a professor as well.  He has a 2-year-old girl, this is their first child. He has been on escitalopram for many years.  He states that is been working pretty well but he still does endorse a significant amount of anxiety.  Has trouble with sleep.  He does state that he drinks too much coffee and also uses Nicorette gum is a stimulant.  He states he used to play basketball 3-4 nights per week prior to COVID and this helped him sleep much more.  He has been exercising some but it is not enough to help him with his sleep.  He states this was a major stress release.  He has struggled for a while with tasks at his job.  He endorses ruminating thoughts.  Will have detailed notes that he has to reread and add to them.  It is impacting his functioning at this time.  When he was younger, he used to do repetitive handwashing but is not doing this now.  He does find that his  symptoms are impacting his day-to-day life.  Medications that he has tried include bupropion, sertraline, fluoxetine, paroxetine.  He reports that he did have symptoms of serotonin toxicity during a cross taper of medications.  He states he is sensitive to medications.  Has not been on tricyclic antidepressants or Luvox.  Unsure about trazodone.  He previously participated in therapy for many years, interested in therapy referral today.  Discussed sleep hygiene in detail.  Denies suicidal or homicidal ideation.  No other complaints today.     Endorses prior problems with substance use disorders - does not want to be prescribed anything habit forming.     Depression symptoms:  Endorses difficulty with sleep, feeling tired or having little energy, overeating, trouble concentrating.  Denies suicidal ideation.  PHQ-9 seven, somewhat difficult     Anxiety symptoms:  GAD7 1/27/2022   1. Feeling nervous, anxious, or on edge? 1   2. Not being able to stop or control worrying? 3   3. Worrying too much about different things? 3   4. Trouble relaxing? 2   5. Being so restless that it is hard to sit still? 0   6. Becoming easily annoyed or irritable? 1   7. Feeling afraid as if something awful might happen? 0   8. If you checked off any problems, how difficult have these problems made it for you to do your work, take care of things at home, or get along with other people? 1   MAHESH-7 Score 10            Fina/Hypomania symptoms: denies  MDQ Scale 1/27/2022   you felt so good or so hyper that other people thought you were not your normal self or you were so hyper that you got into trouble? 0   you were so irritable that you shouted at people or started fights or arguments? 0   you felt much more self-confident than usual? 0   you got much less sleep than usual and found that you didn't really miss it? 0   you were more talkative or spoke much faster than usual? 0   thoughts raced through your head or you couldn't slow your mind  down? 1   you were so easily distracted by things around you that you had trouble concentrating or staying on track? 1   you had more energy than usual? 0   you were much more active or did many more things than usual? 0   you were much more social or outgoing than usual, for example, you telephoned friends in the middle of the night? 0   you were much more interested in sex than usual? 0   you did things that were unusual for you or that other people might have thought were excessive, foolish, or risky? 0   spending money got you or your family in trouble? 0   If you checked YES to more than one of the above, have several of these ever happened during the same period of time? 1   How much of a problem did any of these cause you - like being unable to work; having family, money or legal troubles; getting into arguments or fights? Moderate problem   Mood Disorder Questionnaire Score  2      Psychosis: denies     Attention/Concentration: fair     Body Image/Hx of eating disorders: denies, eating too much occasionally      Suicidal ideation and risk: denies suicidal thoughts, no access to guns, no hx of suicidal thoughts or attempts     Homicidal/Violient ideation and risk: denies     Sleep: poor sleep hygiene     Appetite: overeating     Past Psychiatric History:  Prior diagnoses: OCD, MAHESH, never been diagnosed with ADHD      Inpatient psychiatric treatment: denies     Outpatient psychiatric treatment: last saw psychiatrist, saw someone before Dr. Cast. Four or so years ago. Wasn't really thrilled. Was happy just on lexapro.      Prior medications: as described above     Current medications: lexapro 20mg daily     Prior suicide attempts: denies     Prior history self harm: denies     Prior psychotherapy: has participated in the past     Prior psychological testing: none     Allergies:       Review of patient's allergies indicates:   Allergen Reactions    [facility] house dust        Past Medical History:       Past  Medical History:   Diagnosis Date    Allergy      Anxiety     Elevated cholesterol - not taking medicine      History TBI: denies  History seizures: denies     Past Surgical History:        Past Surgical History:   Procedure Laterality Date    FINGER SURGERY   2007    HERNIA REPAIR   1985      Family History:   Suicide: denies  Substance use: great grandfather   Bipolar disorder/Psychotic disorder: denies  Anxiety: brother, mom and dad  Depression: denies     Social History:  Childhood: born in [place]. Grew up in [place]. Went to college in [employer]. Raised by biological mother and father. Good relationship. They live in [place], [place]. One brother in [place] - younger brother. He's doing well. He has some anxiety, definitely not OCD.   Marital status: has been  for almost 10 years, supportive wife   Children: 2 year old daughter, August   Resides: in [place], [place]  Occupation: professor - [employer]   Hobbies: basketball, enjoys walking/hiking with the family, riding bikes, likes to be outside  Jain: not religlious   Education level: PhD  : denies  Legal: denies  History of abuse/trauma: denies     Substance History:  Tobacco: nicorette gum - used to smoke cigarettes, quit smoking cigarettes probably 12 years ago. Gum - 3 or 4 pieces of day - at night.   Alcohol: no alcohol, when he was in high school would drink a lot. Has addictive personality.   Drug use: high school - opioids, not stimulants, used benzos, had to go treatment.   Caffeine: high caffeine - one of those yeti of coffee - make at home, community coffee      Rehab:  Prior/current AA: inpatient detox/rehab 20 years ago - several times between [age], before college     Review of Systems   PSYCHIATRIC: Pertinant items are noted in the narrative.  RESPIRATORY: No shortness of breath.  CARDIOVASCULAR: No tachycardia or chest pain.  GASTROINTESTINAL: No nausea, vomiting, pain, constipation or diarrhea.    Past Medical, Family and Social History: The patient's  past medical, family and social history have been reviewed and updated as appropriate within the electronic medical record - see encounter notes.      Risk Parameters:  Patient reports no suicidal ideation  Patient reports no homicidal ideation  Patient reports no self-injurious behavior  Patient reports no violent behavior    Exam (detailed: at least 9 elements; comprehensive: all 15 elements)   Constitutional  Vitals:  Most recent vital signs, dated less than 90 days prior to this appointment, were reviewed.     Vitals:    07/09/25 0802   BP: 111/73   Pulse: (!) 55        General:  unremarkable, age appropriate     Musculoskeletal  Muscle Strength/Tone:  no dyskinesia   Gait & Station:  non-ataxic     Psychiatric  Speech:  no latency; no press   Mood & Affect:  appropriate   Thought Process:  normal and logical   Associations:  intact   Thought Content:  normal, no suicidality, no homicidality, delusions, or paranoia   Insight:  intact   Judgement: behavior is adequate to circumstances   Orientation:  grossly intact   Memory: intact for content of interview   Language: grossly intact   Attention Span & Concentration:  able to focus   Fund of Knowledge:  intact and appropriate to age and level of education     Assessment and Diagnosis   Status/Progress: Based on the examination today, the patient's problem(s) is/are well controlled.  New problems have not been presented today.   Co-morbidities, Diagnostic uncertainty, and Lack of compliance are not complicating management of the primary condition.      General Impression:   OCD with good insight  MAHESH     Intervention/Counseling/Treatment Plan   Medication Management: The risks and benefits of medication were discussed with the patient.  Counseling provided with patient as follows: importance of compliance with chosen treatment options was emphasized, risks and benefits of treatment options, including medications, were discussed with the patient, risk factor  reduction, prognosis    Denny is seen today for medication follow-up. Based on assessment:    Continue aripiprazole 2 mg once daily for augmentation of OCD symptoms.    Medication risks, side effects, benefits explained in detail. Discussed risks of tardive dyskinesia, drug induced parkinsonism, metabolic side effects, including weight gain, neuroleptic malignant syndrome.   Continue sertraline 100 mg daily for OCD symptoms.  Continue with Barbra Acuna for imtt.    Please go to emergency department if feeling as though you are a harm to yourself or others or if you are in crisis. Please call the clinic to report any worsening of symptoms or problems associated with medication.    Discussed with patient informed consent, risks vs. benefits, alternative treatments, side effect profile and the inherent unpredictability of individual responses to these treatments. The patient expresses understanding of the above and displays the capacity to agree with this current plan and had no other questions.    Discussed risk of serotonin syndrome with these medications. Symptoms of concern include agitation/restlessness, confusion, rapid heart rate/high blood pressure, dilated pupils, loss of muscle coordination, muscle rigidity, heavy sweating.    Visit today included increased complexity associated with managing the longitudinal care of the patient due to the serious and/or complex managed problem(s) anxiety/ocd.      Return to Clinic: as scheduled, as needed